# Patient Record
Sex: FEMALE | Race: WHITE | NOT HISPANIC OR LATINO | Employment: OTHER | URBAN - METROPOLITAN AREA
[De-identification: names, ages, dates, MRNs, and addresses within clinical notes are randomized per-mention and may not be internally consistent; named-entity substitution may affect disease eponyms.]

---

## 2023-08-16 ENCOUNTER — OFFICE VISIT (OUTPATIENT)
Dept: PRIMARY CARE | Facility: CLINIC | Age: 63
End: 2023-08-16
Payer: MEDICARE

## 2023-08-16 VITALS
HEIGHT: 66 IN | RESPIRATION RATE: 17 BRPM | BODY MASS INDEX: 44.45 KG/M2 | OXYGEN SATURATION: 94 % | HEART RATE: 86 BPM | TEMPERATURE: 96 F | SYSTOLIC BLOOD PRESSURE: 106 MMHG | DIASTOLIC BLOOD PRESSURE: 73 MMHG | WEIGHT: 276.6 LBS

## 2023-08-16 DIAGNOSIS — M43.06 LUMBAR SPONDYLOLYSIS: ICD-10-CM

## 2023-08-16 DIAGNOSIS — F32.1 MAJOR DEPRESSIVE DISORDER, SINGLE EPISODE, MODERATE DEGREE (MULTI): Primary | ICD-10-CM

## 2023-08-16 DIAGNOSIS — M16.12 ARTHRITIS OF LEFT HIP: ICD-10-CM

## 2023-08-16 DIAGNOSIS — E78.2 MIXED HYPERLIPIDEMIA: ICD-10-CM

## 2023-08-16 PROBLEM — M65.4 DE QUERVAIN'S TENOSYNOVITIS: Status: RESOLVED | Noted: 2023-08-16 | Resolved: 2023-08-16

## 2023-08-16 PROBLEM — M48.061 SPINAL STENOSIS OF LUMBAR REGION WITHOUT NEUROGENIC CLAUDICATION: Status: ACTIVE | Noted: 2023-08-16

## 2023-08-16 PROBLEM — M54.16 ACUTE LUMBAR RADICULOPATHY: Status: RESOLVED | Noted: 2023-08-16 | Resolved: 2023-08-16

## 2023-08-16 PROBLEM — M54.50 LOW BACK PAIN: Status: RESOLVED | Noted: 2023-08-16 | Resolved: 2023-08-16

## 2023-08-16 PROCEDURE — 99204 OFFICE O/P NEW MOD 45 MIN: CPT | Performed by: FAMILY MEDICINE

## 2023-08-16 PROCEDURE — 1036F TOBACCO NON-USER: CPT | Performed by: FAMILY MEDICINE

## 2023-08-16 RX ORDER — ESCITALOPRAM OXALATE 10 MG/1
10 TABLET ORAL DAILY
Qty: 30 TABLET | Refills: 1 | Status: SHIPPED | OUTPATIENT
Start: 2023-08-16 | End: 2023-09-11 | Stop reason: SDUPTHER

## 2023-08-16 RX ORDER — BUPROPION HYDROCHLORIDE 150 MG/1
150 TABLET ORAL EVERY MORNING
Qty: 30 TABLET | Refills: 1 | Status: SHIPPED | OUTPATIENT
Start: 2023-08-16 | End: 2023-09-11 | Stop reason: SDUPTHER

## 2023-08-16 RX ORDER — BUSPIRONE HYDROCHLORIDE 15 MG/1
15 TABLET ORAL 3 TIMES DAILY PRN
Qty: 90 TABLET | Refills: 11 | Status: SHIPPED | OUTPATIENT
Start: 2023-08-16 | End: 2024-08-15

## 2023-08-16 ASSESSMENT — PATIENT HEALTH QUESTIONNAIRE - PHQ9
6. FEELING BAD ABOUT YOURSELF - OR THAT YOU ARE A FAILURE OR HAVE LET YOURSELF OR YOUR FAMILY DOWN: MORE THAN HALF THE DAYS
8. MOVING OR SPEAKING SO SLOWLY THAT OTHER PEOPLE COULD HAVE NOTICED. OR THE OPPOSITE, BEING SO FIGETY OR RESTLESS THAT YOU HAVE BEEN MOVING AROUND A LOT MORE THAN USUAL: SEVERAL DAYS
5. POOR APPETITE OR OVEREATING: NEARLY EVERY DAY
SUM OF ALL RESPONSES TO PHQ QUESTIONS 1-9: 16
10. IF YOU CHECKED OFF ANY PROBLEMS, HOW DIFFICULT HAVE THESE PROBLEMS MADE IT FOR YOU TO DO YOUR WORK, TAKE CARE OF THINGS AT HOME, OR GET ALONG WITH OTHER PEOPLE: VERY DIFFICULT
7. TROUBLE CONCENTRATING ON THINGS, SUCH AS READING THE NEWSPAPER OR WATCHING TELEVISION: NOT AT ALL
2. FEELING DOWN, DEPRESSED OR HOPELESS: MORE THAN HALF THE DAYS
3. TROUBLE FALLING OR STAYING ASLEEP OR SLEEPING TOO MUCH: MORE THAN HALF THE DAYS
SUM OF ALL RESPONSES TO PHQ9 QUESTIONS 1 AND 2: 5
9. THOUGHTS THAT YOU WOULD BE BETTER OFF DEAD, OR OF HURTING YOURSELF: NOT AT ALL
1. LITTLE INTEREST OR PLEASURE IN DOING THINGS: NEARLY EVERY DAY
4. FEELING TIRED OR HAVING LITTLE ENERGY: NEARLY EVERY DAY

## 2023-08-16 ASSESSMENT — COLUMBIA-SUICIDE SEVERITY RATING SCALE - C-SSRS
2. HAVE YOU ACTUALLY HAD ANY THOUGHTS OF KILLING YOURSELF?: NO
6. HAVE YOU EVER DONE ANYTHING, STARTED TO DO ANYTHING, OR PREPARED TO DO ANYTHING TO END YOUR LIFE?: NO
1. IN THE PAST MONTH, HAVE YOU WISHED YOU WERE DEAD OR WISHED YOU COULD GO TO SLEEP AND NOT WAKE UP?: NO

## 2023-08-16 ASSESSMENT — ENCOUNTER SYMPTOMS
DYSPHORIC MOOD: 1
HEADACHES: 0
APPETITE CHANGE: 0
DEPRESSION: 1
SHORTNESS OF BREATH: 0
LOSS OF SENSATION IN FEET: 0
HALLUCINATIONS: 0
OCCASIONAL FEELINGS OF UNSTEADINESS: 1
CONFUSION: 0
POLYDIPSIA: 0
DECREASED CONCENTRATION: 1
HYPERACTIVE: 0
POLYPHAGIA: 0
COUGH: 0
NERVOUS/ANXIOUS: 0
PALPITATIONS: 0
SLEEP DISTURBANCE: 0
ACTIVITY CHANGE: 0
UNEXPECTED WEIGHT CHANGE: 0

## 2023-08-16 NOTE — PATIENT INSTRUCTIONS
Start Escitalopram 10 mg daily.   Start Wellbutrin 150 mg once a day.     Follow up in 3 weeks, call sooner if issues.     988 is a crisis line if you are struggling. IF you are actively suicidal, you need to go directly to ER>

## 2023-08-16 NOTE — ASSESSMENT & PLAN NOTE
Discussed diagnosis and medications.   Elysia was counseled on diagnosis and treatment options, including counseling and medications. After shared decision-making, patient agreed to start antidepressant medication. She understands latency of effect on initiation, need to titrate dose, need to take medication on a scheduled basis at same time every day, and need to call before stopping or altering medication dosing. Elysia also understands risk of transient increase in anxiety on initiation of medication, risk of bang in undiagnosed bipolar disorder, risk of withdrawal if stops suddenly, need for extended duration of medication of at least 6-12 months once therapeutic dose of correct agent achieved, and possible need to try alternate agents if initial agent ineffective on titration. Discussed risk, benefits and side effects of medication, including increased risk of suicidal ideation. Patient is not currently a risk to self or others, is able to contract for safety, and did so at this visit. Patient agreed to call if any side effects and discuss this before stopping medication so can assess and properly address. Agrees to frequent follow up during titration phase. Given information re 988 for crisis and suicide prevention. Elysia agreed to seek immediate help if She feels there is imminent risk of self-harm.     Patient was started on Escitalopram and WEllbutrin. In addition, was also offered and accepted Buspirone 15 mg tid for prn use for anxiety.     She will seek out counselor. She will follow up in 3 weeks. Given info re 988.

## 2023-08-16 NOTE — PROGRESS NOTES
Subjective   Patient ID: Elysia Zuniga is a 62 y.o. female who presents for Establish Care.    New patient    Right hip pain, has degeneration and told bone-on-bone. She was supposed to have hip surgery last year. Had insurance issues. She had hip specialist at [facility]. She switched to Medicare due to herniated discs in back. Was using cane until recently, was doctor at [facility] Dr. Toney then had to switch to [facility] Dr. Zimmer. Now insurance has changed and she needs to come back here for her care.     Has severe Degen left hip and was to have surgery.  Did see PCP at [facility]. Told EKG abnormal and needed cardiac clearance. She is not sure exactly what was going on with EKG.     Hyperlipidemia - last labs 1 yr ago. TG over 300, LDL consistently over 150. Sugars and CMP were normal.     Right sided lunbar spinal stenosis. MRI spine 2018 whowed herniated disc with probable central canal stenosis. She has not been seen for this in a while. She was on cane for a while. Now using walker. Initially was at Rehab at [facility] for couple weeks and was doing better. Her back is bothering her more.     MRI choswed cholelithiasis as incidental finding but she has not symptoms.     Depression - has had since 2019 or 2020. She had feelings of needing to isolate from others, easily irritated, intolerance, does not want to be around others. She is much more negative. Has spells of weepiness. Feels badly about self. Hates way she looks, then gets stressed and overeats. Is stress eater. Hopeless and worthless. No delusions or hallucinations. Live with Mom. Never , no children. Has 1 brother and one sister, 5 nieces and nephews. They have noticed change in her. Was on Welbutrin 150 mg bid then 300 mg XR. Had less agitation and weepiness. Less overwhelmed and frustrated. Worked well for a couple months. She did not go back about it. NO side effects. It took the edge off but did not make her feels markedly improved.  No suicidal thoughts.  "           Current Outpatient Medications:     buPROPion XL (Wellbutrin XL) 150 mg 24 hr tablet, Take 1 tablet (150 mg) by mouth once daily in the morning. Do not crush, chew, or split., Disp: 30 tablet, Rfl: 1    busPIRone (Buspar) 15 mg tablet, Take 1 tablet (15 mg) by mouth 3 times a day as needed (anxiety or stress)., Disp: 90 tablet, Rfl: 11    escitalopram (Lexapro) 10 mg tablet, Take 1 tablet (10 mg) by mouth once daily., Disp: 30 tablet, Rfl: 1    Patient Active Problem List   Diagnosis    Lumbar spondylolysis    Mixed hyperlipidemia    Major depressive disorder, single episode, moderate (CMS/HCC)    Arthritis of left hip         Review of Systems   Constitutional:  Negative for activity change, appetite change and unexpected weight change.   Respiratory:  Negative for cough and shortness of breath.    Cardiovascular:  Positive for leg swelling. Negative for chest pain and palpitations.        Right leg swells more than left for years. She has varicose veins.    Endocrine: Positive for heat intolerance. Negative for polydipsia, polyphagia and polyuria.   Genitourinary:         Unable to make it to bathroom on time. As soon as gets urge, she has to go now. Has had stress incontincence in the past. Weight was normally around 220-230.   Skin:  Negative for rash.   Neurological:  Negative for headaches.   Psychiatric/Behavioral:  Positive for decreased concentration and dysphoric mood. Negative for confusion, hallucinations, sleep disturbance and suicidal ideas. The patient is not nervous/anxious and is not hyperactive.        Objective   /73 (BP Location: Left arm, Patient Position: Sitting, BP Cuff Size: Large adult)   Pulse 86   Temp 35.6 °C (96 °F)   Resp 17   Ht 1.676 m (5' 6\")   Wt 125 kg (276 lb 9.6 oz)   SpO2 94%   BMI 44.64 kg/m²     Physical Exam  Vitals reviewed.   Constitutional:       Appearance: Normal appearance.   Pulmonary:      Effort: Pulmonary effort is normal.   Musculoskeletal: "      Cervical back: Normal range of motion and neck supple.   Skin:     General: Skin is warm and dry.   Neurological:      Mental Status: She is alert.   Psychiatric:         Attention and Perception: Attention normal.         Mood and Affect: Mood is depressed. Affect is tearful.         Speech: Speech normal.         Behavior: Behavior normal.         Thought Content: Thought content normal.         Cognition and Memory: Cognition normal.         Judgment: Judgment normal.         Assessment/Plan   Problem List Items Addressed This Visit       Lumbar spondylolysis    Mixed hyperlipidemia     Elevated LDL and TG last checked in 2022. Will readdress once address depression meds.          Major depressive disorder, single episode, moderate (CMS/HCC) - Primary     Discussed diagnosis and medications.   Elysia was counseled on diagnosis and treatment options, including counseling and medications. After shared decision-making, patient agreed to start antidepressant medication. She understands latency of effect on initiation, need to titrate dose, need to take medication on a scheduled basis at same time every day, and need to call before stopping or altering medication dosing. Elysia also understands risk of transient increase in anxiety on initiation of medication, risk of bang in undiagnosed bipolar disorder, risk of withdrawal if stops suddenly, need for extended duration of medication of at least 6-12 months once therapeutic dose of correct agent achieved, and possible need to try alternate agents if initial agent ineffective on titration. Discussed risk, benefits and side effects of medication, including increased risk of suicidal ideation. Patient is not currently a risk to self or others, is able to contract for safety, and did so at this visit. Patient agreed to call if any side effects and discuss this before stopping medication so can assess and properly address. Agrees to frequent follow up during titration  phase. Given information re 988 for crisis and suicide prevention. Elysia agreed to seek immediate help if She feels there is imminent risk of self-harm.     Patient was started on Escitalopram and WEllbutrin. In addition, was also offered and accepted Buspirone 15 mg tid for prn use for anxiety.     She will seek out counselor. She will follow up in 3 weeks. Given info re 988.            Relevant Medications    buPROPion XL (Wellbutrin XL) 150 mg 24 hr tablet    escitalopram (Lexapro) 10 mg tablet    busPIRone (Buspar) 15 mg tablet    Arthritis of left hip     Will readdress once deal with medication. She will sort out insurance in meantime.               Assessment, plans, tests, and follow up discussed with patient and patient verbalized understanding. Elysia was given an opportunity to ask questions and  any concerns were addressed including but not limited to meds, side effects, need to address other issues after depression under control. .

## 2023-09-11 ENCOUNTER — OFFICE VISIT (OUTPATIENT)
Dept: PRIMARY CARE | Facility: CLINIC | Age: 63
End: 2023-09-11
Payer: MEDICARE

## 2023-09-11 VITALS
WEIGHT: 271.6 LBS | BODY MASS INDEX: 43.65 KG/M2 | OXYGEN SATURATION: 98 % | DIASTOLIC BLOOD PRESSURE: 58 MMHG | HEIGHT: 66 IN | HEART RATE: 94 BPM | TEMPERATURE: 96.2 F | SYSTOLIC BLOOD PRESSURE: 100 MMHG

## 2023-09-11 DIAGNOSIS — Z11.59 ENCOUNTER FOR HEPATITIS C SCREENING TEST FOR LOW RISK PATIENT: ICD-10-CM

## 2023-09-11 DIAGNOSIS — Z12.31 ENCOUNTER FOR SCREENING MAMMOGRAM FOR BREAST CANCER: ICD-10-CM

## 2023-09-11 DIAGNOSIS — Z12.4 SCREENING FOR CERVICAL CANCER: ICD-10-CM

## 2023-09-11 DIAGNOSIS — R73.09 OTHER ABNORMAL GLUCOSE: ICD-10-CM

## 2023-09-11 DIAGNOSIS — E66.09 OBESITY DUE TO EXCESS CALORIES WITHOUT SERIOUS COMORBIDITY, UNSPECIFIED CLASSIFICATION: ICD-10-CM

## 2023-09-11 DIAGNOSIS — Z13.220 SCREENING, LIPID: ICD-10-CM

## 2023-09-11 DIAGNOSIS — M25.552 PAIN, JOINT, HIP, LEFT: ICD-10-CM

## 2023-09-11 DIAGNOSIS — Z13.1 SCREENING FOR DIABETES MELLITUS: ICD-10-CM

## 2023-09-11 DIAGNOSIS — F32.1 MAJOR DEPRESSIVE DISORDER, SINGLE EPISODE, MODERATE DEGREE (MULTI): Primary | ICD-10-CM

## 2023-09-11 DIAGNOSIS — Z12.11 SCREEN FOR COLON CANCER: ICD-10-CM

## 2023-09-11 PROCEDURE — 1036F TOBACCO NON-USER: CPT | Performed by: FAMILY MEDICINE

## 2023-09-11 PROCEDURE — 99214 OFFICE O/P EST MOD 30 MIN: CPT | Performed by: FAMILY MEDICINE

## 2023-09-11 RX ORDER — BUPROPION HYDROCHLORIDE 150 MG/1
150 TABLET ORAL EVERY MORNING
Qty: 90 TABLET | Refills: 3 | Status: SHIPPED | OUTPATIENT
Start: 2023-09-11 | End: 2024-09-10

## 2023-09-11 RX ORDER — MULTIVITAMIN
1 TABLET ORAL DAILY
COMMUNITY

## 2023-09-11 RX ORDER — DICLOFENAC SODIUM 10 MG/G
4 GEL TOPICAL 4 TIMES DAILY PRN
Qty: 450 G | Refills: 2 | Status: SHIPPED | OUTPATIENT
Start: 2023-09-11

## 2023-09-11 RX ORDER — IBUPROFEN 100 MG/5ML
1000 SUSPENSION, ORAL (FINAL DOSE FORM) ORAL CONTINUOUS PRN
COMMUNITY

## 2023-09-11 RX ORDER — ESCITALOPRAM OXALATE 10 MG/1
10 TABLET ORAL DAILY
Qty: 90 TABLET | Refills: 3 | Status: SHIPPED | OUTPATIENT
Start: 2023-09-11 | End: 2024-09-10

## 2023-09-11 NOTE — PROGRESS NOTES
Subjective   Patient ID: Elysia Zuniga is a 63 y.o. female who presents for 3 week follow up depression .    Here to follow up on Depression and anxiety - mood is better. Much less agitation. Also on Bupropion which was started last time.  Only needed Buspar 4 times in a month.     Obesity - she is losing weight and lost 5 lbs since last visit. Less cravings since on Bupropion.     Due for Medicare Wellness still this year.     Hip pain left times 1.5 yrs. Standing is the worse. Unable to stand up straight. Has history of disc issues. Walks hunched over. Walker when out of house. Pain worse. Pain near greater trochanter and in lower back. Sitting is ok. Unable to stand up for any length of time. Had xrays of hip and told her bad arthritis. Hip xr done [facility] April 2022. Pain is worse when getting up from sitting.     Has history herniated disc lumbar in the past. No numbness or tingling or pain down leg.            Current Outpatient Medications:     ascorbic acid (Vitamin C) 1,000 mg tablet, Take 1 tablet (1,000 mg) by mouth once daily., Disp: , Rfl:     busPIRone (Buspar) 15 mg tablet, Take 1 tablet (15 mg) by mouth 3 times a day as needed (anxiety or stress)., Disp: 90 tablet, Rfl: 11    melatonin 10 mg tablet,chewable, Chew once daily at bedtime., Disp: , Rfl:     multivitamin tablet, Take 1 tablet by mouth once daily., Disp: , Rfl:     buPROPion XL (Wellbutrin XL) 150 mg 24 hr tablet, Take 1 tablet (150 mg) by mouth once daily in the morning. Do not crush, chew, or split., Disp: 90 tablet, Rfl: 3    diclofenac sodium (Voltaren) 1 % gel gel, Apply 1 Application topically 4 times a day as needed (pain)., Disp: 450 g, Rfl: 2    escitalopram (Lexapro) 10 mg tablet, Take 1 tablet (10 mg) by mouth once daily., Disp: 90 tablet, Rfl: 3    Patient Active Problem List   Diagnosis    Lumbar spondylolysis    Mixed hyperlipidemia    Major depressive disorder, single episode, moderate degree (CMS/HCC)    Arthritis of left hip  "   Spinal stenosis of lumbar region without neurogenic claudication    Pain, joint, hip, left    Other abnormal glucose    Obesity due to excess calories without serious comorbidity         Review of Systems    Objective   /58 (BP Location: Left arm, Patient Position: Sitting, BP Cuff Size: Large adult)   Pulse 94   Temp 35.7 °C (96.2 °F) (Temporal)   Ht 1.676 m (5' 6\")   Wt 123 kg (271 lb 9.6 oz)   SpO2 98%   BMI 43.84 kg/m²     Physical Exam    Assessment/Plan   Problem List Items Addressed This Visit       Major depressive disorder, single episode, moderate degree (CMS/HCC) - Primary     Doing great on medicatio. Refilled meds for one yr.          Relevant Medications    buPROPion XL (Wellbutrin XL) 150 mg 24 hr tablet    escitalopram (Lexapro) 10 mg tablet    Pain, joint, hip, left     Reviewed prior xray. She had advanced arthritis left hip. NSAIDs cause leg swelling. Will add diclofenac gel Refer to ortho         Relevant Medications    diclofenac sodium (Voltaren) 1 % gel gel    Other Relevant Orders    Referral to Orthopaedic Surgery    Other abnormal glucose     Significance unclear. Check A1C.          Relevant Orders    Hemoglobin A1C    Obesity due to excess calories without serious comorbidity     Discussed healthy lifestyle, diet, exercise and weight loss. Discussed role in glucose management.          Relevant Orders    Comprehensive Metabolic Panel    Hemoglobin A1C    Lipid Panel     Other Visit Diagnoses       Screen for colon cancer        Relevant Orders    Colonoscopy Screening    Encounter for screening mammogram for breast cancer        Relevant Orders    BI mammo bilateral screening tomosynthesis    Screening for cervical cancer        Relevant Orders    Referral to Gynecology    Screening for diabetes mellitus        Relevant Orders    Comprehensive Metabolic Panel    Hemoglobin A1C    Screening, lipid        Relevant Orders    Lipid Panel    Encounter for hepatitis C screening " test for low risk patient        Relevant Orders    Hepatitis C Antibody              Assessment, plans, tests, and follow up discussed with patient and patient verbalized understanding. Elysia was given an opportunity to ask questions and  any concerns were addressed including but not limited to lab results, orders, health screenings, medications and follow up.

## 2023-09-18 PROBLEM — R73.09 OTHER ABNORMAL GLUCOSE: Status: ACTIVE | Noted: 2023-09-18

## 2023-09-18 PROBLEM — E66.09 OBESITY DUE TO EXCESS CALORIES WITHOUT SERIOUS COMORBIDITY: Status: ACTIVE | Noted: 2023-09-18

## 2023-09-29 DIAGNOSIS — M25.552 HIP PAIN, ACUTE, LEFT: ICD-10-CM

## 2023-09-30 RX ORDER — DEXTROMETHORPHAN HYDROBROMIDE, GUAIFENESIN 5; 100 MG/5ML; MG/5ML
1 LIQUID ORAL
COMMUNITY
End: 2023-12-15 | Stop reason: HOSPADM

## 2023-09-30 RX ORDER — CHOLECALCIFEROL (VITAMIN D3) 125 MCG
CAPSULE ORAL
COMMUNITY
End: 2023-12-12 | Stop reason: ALTCHOICE

## 2023-09-30 RX ORDER — MICONAZOLE NITRATE 2 %
1 POWDER (GRAM) TOPICAL DAILY
COMMUNITY
End: 2023-12-12 | Stop reason: ALTCHOICE

## 2023-10-05 ENCOUNTER — OFFICE VISIT (OUTPATIENT)
Dept: ORTHOPEDIC SURGERY | Facility: CLINIC | Age: 63
End: 2023-10-05
Payer: MEDICARE

## 2023-10-05 ENCOUNTER — ANCILLARY PROCEDURE (OUTPATIENT)
Dept: RADIOLOGY | Facility: CLINIC | Age: 63
End: 2023-10-05
Payer: MEDICARE

## 2023-10-05 ENCOUNTER — PREP FOR PROCEDURE (OUTPATIENT)
Dept: ORTHOPEDIC SURGERY | Facility: CLINIC | Age: 63
End: 2023-10-05

## 2023-10-05 ENCOUNTER — APPOINTMENT (OUTPATIENT)
Dept: RADIOLOGY | Facility: CLINIC | Age: 63
End: 2023-10-05
Payer: MEDICARE

## 2023-10-05 VITALS — WEIGHT: 270.2 LBS | BODY MASS INDEX: 43.42 KG/M2 | HEIGHT: 66 IN

## 2023-10-05 DIAGNOSIS — M25.552 HIP PAIN, ACUTE, LEFT: ICD-10-CM

## 2023-10-05 DIAGNOSIS — M16.10 ARTHRITIS OF HIP: Primary | ICD-10-CM

## 2023-10-05 DIAGNOSIS — M25.552 PAIN, JOINT, HIP, LEFT: ICD-10-CM

## 2023-10-05 PROCEDURE — 99214 OFFICE O/P EST MOD 30 MIN: CPT | Performed by: SPECIALIST

## 2023-10-05 PROCEDURE — 73502 X-RAY EXAM HIP UNI 2-3 VIEWS: CPT | Mod: LT

## 2023-10-05 PROCEDURE — 73502 X-RAY EXAM HIP UNI 2-3 VIEWS: CPT | Mod: LEFT SIDE | Performed by: RADIOLOGY

## 2023-10-05 PROCEDURE — 1036F TOBACCO NON-USER: CPT | Performed by: SPECIALIST

## 2023-10-05 NOTE — LETTER
October 5, 2023     Xiomara Shrestha MD  [address] [address]    Patient: Elysia Zuniga   YOB: 1960   Date of Visit: 10/5/2023       Dear Dr. Xiomara Shrestha MD:    Thank you for referring Elysia Zuniga to me for evaluation. Below are my notes for this consultation.  If you have questions, please do not hesitate to call me. I look forward to following your patient along with you.       Sincerely,     Natanael De Jesus MD      CC: No Recipients  ______________________________________________________________________________________    PRIMARY CARE PHYSICIAN: Xiomara Shrestha MD  REFERRING PROVIDER: Xiomara Shrestha MD  [address] [place] [address]     CONSULT ORTHOPAEDIC: Hip Evaluation        ASSESSMENT & PLAN    IMPRESSION:  Left hip osteoarthritis    PLAN:  Patient is failed all conservative treatments and wishes to proceed with surgery.  We had a long discussion regarding the nature of the left total hip arthroplasty.  All risks and benefits and treatment options were described in great detail with the patient and a preoperative informed consent was obtained surgery is scheduled for this December.      SUBJECTIVE  CHIEF COMPLAINT:   Chief Complaint   Patient presents with   • Left Hip - Pain        HPI: Elysia Zuniga is a 63 y.o. patient. Elysia Zuniga has had progressive problems with the left hip over the past 10 years. They do not report any trauma. They do not report any constant or progressive numbness or tingling in their legs. Their symptoms are interfering with activities which include simple daily standing and walking, ascending and descending stairs, and affects sleep.     FUNCTIONAL STATUS: limited:  unable to perform activities of daily living.  AMBULATORY STATUS: Househould ambulation with partial assistance with walker  PREVIOUS TREATMENTS: Cortisone injection oral anti-inflammatories, physical  "therapy, with no improvement  HISTORY OF SURGERY ON AFFECTED HIP(S): No   BACK PAIN REPORTED: Yes           OBJECTIVE    PHYSICAL EXAM      6/3/2018     6:49 PM 6/4/2018     1:50 AM 8/16/2023     1:57 PM 9/11/2023    11:27 AM 10/5/2023     1:16 PM   Vitals   Systolic 172  106 100    Diastolic 97  73 58    Heart Rate 79  86 94    Temp 36.6 °C (97.9 °F)  35.6 °C (96 °F) 35.7 °C (96.2 °F)    Resp 20  17     Height (in) 1.676 m (5' 5.98\") 1.676 m (5' 5.98\") 1.676 m (5' 6\") 1.676 m (5' 6\") 1.676 m (5' 6\")   Weight (lb) 264.99 250.88 276.6 271.6 270.2   BMI 42.79 kg/m2 40.51 kg/m2 44.64 kg/m2 43.84 kg/m2 43.61 kg/m2   BSA (m2) 2.36 m2 2.3 m2 2.41 m2 2.39 m2 2.39 m2   Visit Report   Report Report Report      Body mass index is 43.61 kg/m².              IMAGING:  Multiple views of the affected left hip(s) demonstrate: Severe osteoarthritis with associated sclerosis osteophytes and joint space reduction, and flattening of the femoral head..   X-rays were personally reviewed and interpreted by me.  Radiology reports were reviewed by me as well, if readily available at the time.                                  "

## 2023-10-05 NOTE — PROGRESS NOTES
"PRIMARY CARE PHYSICIAN: Xiomara Shrestha MD  REFERRING PROVIDER: Xiomara Shrestha MD  [address]  [address] [place] [address]     CONSULT ORTHOPAEDIC: Hip Evaluation        ASSESSMENT & PLAN    IMPRESSION:  Left hip osteoarthritis    PLAN:  Patient is failed all conservative treatments and wishes to proceed with surgery.  We had a long discussion regarding the nature of the left total hip arthroplasty.  All risks and benefits and treatment options were described in great detail with the patient and a preoperative informed consent was obtained surgery is scheduled for this December.      SUBJECTIVE  CHIEF COMPLAINT:   Chief Complaint   Patient presents with    Left Hip - Pain        HPI: Elysia Zuniga is a 63 y.o. patient. Elysia Zuniga has had progressive problems with the left hip over the past 10 years. They do not report any trauma. They do not report any constant or progressive numbness or tingling in their legs. Their symptoms are interfering with activities which include simple daily standing and walking, ascending and descending stairs, and affects sleep.     FUNCTIONAL STATUS: limited:  unable to perform activities of daily living.  AMBULATORY STATUS: Househould ambulation with partial assistance with walker  PREVIOUS TREATMENTS: Cortisone injection oral anti-inflammatories, physical therapy, with no improvement  HISTORY OF SURGERY ON AFFECTED HIP(S): No   BACK PAIN REPORTED: Yes           OBJECTIVE    PHYSICAL EXAM      6/3/2018     6:49 PM 6/4/2018     1:50 AM 8/16/2023     1:57 PM 9/11/2023    11:27 AM 10/5/2023     1:16 PM   Vitals   Systolic 172  106 100    Diastolic 97  73 58    Heart Rate 79  86 94    Temp 36.6 °C (97.9 °F)  35.6 °C (96 °F) 35.7 °C (96.2 °F)    Resp 20  17     Height (in) 1.676 m (5' 5.98\") 1.676 m (5' 5.98\") 1.676 m (5' 6\") 1.676 m (5' 6\") 1.676 m (5' 6\")   Weight (lb) 264.99 250.88 276.6 271.6 270.2   BMI 42.79 kg/m2 40.51 kg/m2 44.64 kg/m2 43.84 kg/m2 " 43.61 kg/m2   BSA (m2) 2.36 m2 2.3 m2 2.41 m2 2.39 m2 2.39 m2   Visit Report   Report Report Report      Body mass index is 43.61 kg/m².              IMAGING:  Multiple views of the affected left hip(s) demonstrate: Severe osteoarthritis with associated sclerosis osteophytes and joint space reduction, and flattening of the femoral head..   X-rays were personally reviewed and interpreted by me.  Radiology reports were reviewed by me as well, if readily available at the time.

## 2023-10-05 NOTE — PROGRESS NOTES
Left hip pain for years - states she needs a total hip replacement.   Was going to have it done but then covid happened so she didn't.  States she has mostly back pain but knows she has a herniated disc as well as hip OA.   Takes Tylenol during the day for pain.     Xrays done today     Hip Musculoskeletal Exam

## 2023-10-23 ENCOUNTER — HOSPITAL ENCOUNTER (OUTPATIENT)
Dept: RADIOLOGY | Facility: HOSPITAL | Age: 63
Discharge: HOME | End: 2023-10-23
Payer: MEDICARE

## 2023-10-23 DIAGNOSIS — Z12.31 ENCOUNTER FOR SCREENING MAMMOGRAM FOR BREAST CANCER: ICD-10-CM

## 2023-10-23 PROBLEM — Z01.419 WELL WOMAN EXAM WITH ROUTINE GYNECOLOGICAL EXAM: Status: ACTIVE | Noted: 2023-10-23

## 2023-10-23 PROCEDURE — 77067 SCR MAMMO BI INCL CAD: CPT | Mod: BILATERAL PROCEDURE | Performed by: RADIOLOGY

## 2023-10-23 PROCEDURE — 77063 BREAST TOMOSYNTHESIS BI: CPT | Mod: 50

## 2023-10-23 PROCEDURE — 77063 BREAST TOMOSYNTHESIS BI: CPT | Mod: BILATERAL PROCEDURE | Performed by: RADIOLOGY

## 2023-10-23 NOTE — ASSESSMENT & PLAN NOTE
Pap and HPV are sent today. Mammogram was performed last year.   Regular exercise and attaining/maintaining a healthy weight is encouraged.   Adequate calcium intake with diet or supplements is encouraged.    We will notify of any abnormal results.

## 2023-10-23 NOTE — PROGRESS NOTES
Subjective   Patient ID: Elysia Zuniga is a 63 y.o. female who presents for New Patient Visit (Gyn exam).  She was referred by Dr. Shrestha for annual exam. She denies having any pap for about 20 years. No pap or prior gyn record is found on review of the EMR. She denies any past cervical dysplasia. Mammogram returned negative last month.   She has a history of endometrial ablation in abut 2010. She was amenorrheic after this. She denies any issues transitioning into menopause.    She has urge incontinence overnight and needs to wear a pad for this. She is considering consultation for this when her hip issue is resolved.         Review of Systems   Constitutional:  Negative for activity change.   HENT:  Negative for congestion.    Respiratory:  Negative for apnea and cough.    Cardiovascular:  Negative for chest pain.   Gastrointestinal:  Negative for constipation and diarrhea.   Genitourinary:  Positive for enuresis and urgency. Negative for hematuria and vaginal pain.   Musculoskeletal:  Negative for joint swelling.   Neurological:  Negative for dizziness.   Psychiatric/Behavioral:  Negative for agitation.        Past Medical History:   Diagnosis Date    Acute lumbar radiculopathy 08/16/2023    Arthritis     De Quervain's tenosynovitis 08/16/2023    Depression     Spinal stenosis of lumbar region without neurogenic claudication 08/16/2023      Past Surgical History:   Procedure Laterality Date    ENDOMETRIAL ABLATION      NOSE SURGERY        Allergies   Allergen Reactions    Bee Venom Protein (Honey Bee) Other and Shortness of breath    Mold Unknown    Penicillins Hives, Itching, Other and Swelling     Hives sob      Current Outpatient Medications on File Prior to Visit   Medication Sig Dispense Refill    acetaminophen (Tylenol 8 HOUR) 650 mg ER tablet Take 1 tablet (650 mg) by mouth. 3-4 Times Daily; Do not crush, chew, or split.      ascorbic acid (Vitamin C) 1,000 mg tablet Take 1 tablet (1,000 mg) by mouth  once daily.      buPROPion XL (Wellbutrin XL) 150 mg 24 hr tablet Take 1 tablet (150 mg) by mouth once daily in the morning. Do not crush, chew, or split. 90 tablet 3    busPIRone (Buspar) 15 mg tablet Take 1 tablet (15 mg) by mouth 3 times a day as needed (anxiety or stress). 90 tablet 11    diclofenac sodium (Voltaren) 1 % gel gel Apply 1 Application topically 4 times a day as needed (pain). 450 g 2    escitalopram (Lexapro) 10 mg tablet Take 1 tablet (10 mg) by mouth once daily. 90 tablet 3    lysine HCL 1,000 mg tablet Take 1 tablet by mouth once daily.      melatonin 10 mg tablet,chewable Chew once daily at bedtime.      multivitamin tablet Take 1 tablet by mouth once daily.      naproxen sodium (Aleve) 220 mg capsule       RANITIDINE HCL ORAL Take 1 tablet by mouth once daily. 150 MG Tab       No current facility-administered medications on file prior to visit.        Objective   Physical Exam  Constitutional:       Appearance: Normal appearance. She is obese.   Neck:      Thyroid: No thyromegaly.   Cardiovascular:      Rate and Rhythm: Normal rate and regular rhythm.      Heart sounds: Normal heart sounds.   Pulmonary:      Effort: Pulmonary effort is normal.      Breath sounds: Normal breath sounds.   Chest:      Chest wall: No mass.   Breasts:     Right: Normal. No inverted nipple, mass, nipple discharge or skin change.      Left: Normal. No inverted nipple, mass, nipple discharge or skin change.   Abdominal:      General: There is no distension.      Palpations: Abdomen is soft. There is no mass.      Tenderness: There is no abdominal tenderness.   Genitourinary:     General: Normal vulva.      Exam position: Lithotomy position.      Labia:         Right: No rash.         Left: No rash.       Vagina: Normal. No lesions.      Cervix: No friability or lesion.      Uterus: Normal. Not enlarged and not tender.       Adnexa: Right adnexa normal and left adnexa normal.        Right: No mass or tenderness.           Left: No mass or tenderness.     Musculoskeletal:         General: No deformity.      Cervical back: Neck supple.   Lymphadenopathy:      Cervical: No cervical adenopathy.   Skin:     General: Skin is warm and dry.      Findings: No rash.   Neurological:      General: No focal deficit present.      Mental Status: She is alert.   Psychiatric:         Mood and Affect: Mood normal.         Behavior: Behavior is cooperative.         Thought Content: Thought content normal.           Assessment/Plan   Problem List Items Addressed This Visit             ICD-10-CM    Well woman exam with routine gynecological exam - Primary Z01.419     Pap and HPV are sent today. Mammogram was performed last year.   Regular exercise and attaining/maintaining a healthy weight is encouraged.   Adequate calcium intake with diet or supplements is encouraged.    We will notify of any abnormal results.

## 2023-10-25 ENCOUNTER — OFFICE VISIT (OUTPATIENT)
Dept: OBSTETRICS AND GYNECOLOGY | Facility: CLINIC | Age: 63
End: 2023-10-25
Payer: MEDICARE

## 2023-10-25 VITALS
DIASTOLIC BLOOD PRESSURE: 66 MMHG | HEIGHT: 66 IN | SYSTOLIC BLOOD PRESSURE: 98 MMHG | BODY MASS INDEX: 43.55 KG/M2 | WEIGHT: 271 LBS

## 2023-10-25 DIAGNOSIS — Z01.419 WELL WOMAN EXAM WITH ROUTINE GYNECOLOGICAL EXAM: Primary | ICD-10-CM

## 2023-10-25 PROCEDURE — 99386 PREV VISIT NEW AGE 40-64: CPT | Performed by: OBSTETRICS & GYNECOLOGY

## 2023-10-25 PROCEDURE — 1036F TOBACCO NON-USER: CPT | Performed by: OBSTETRICS & GYNECOLOGY

## 2023-10-25 PROCEDURE — 88175 CYTOPATH C/V AUTO FLUID REDO: CPT

## 2023-10-25 PROCEDURE — 87624 HPV HI-RISK TYP POOLED RSLT: CPT

## 2023-10-25 ASSESSMENT — ENCOUNTER SYMPTOMS
DIZZINESS: 0
APNEA: 0
CONSTIPATION: 0
COUGH: 0
HEMATURIA: 0
JOINT SWELLING: 0
ACTIVITY CHANGE: 0
DIARRHEA: 0
AGITATION: 0

## 2023-11-07 LAB
CYTOLOGY CMNT CVX/VAG CYTO-IMP: NORMAL
HPV HR GENOTYPES PNL CVX NAA+PROBE: NEGATIVE
HPV HR GENOTYPES PNL CVX NAA+PROBE: NEGATIVE
HPV16 DNA SPEC QL NAA+PROBE: NEGATIVE
HPV18 DNA SPEC QL NAA+PROBE: NEGATIVE
LAB AP HPV GENOTYPE QUESTION: YES
LAB AP HPV HR: NORMAL
LABORATORY COMMENT REPORT: NORMAL
PATH REPORT.TOTAL CANCER: NORMAL

## 2023-11-28 ENCOUNTER — TELEPHONE (OUTPATIENT)
Dept: ORTHOPEDIC SURGERY | Facility: CLINIC | Age: 63
End: 2023-11-28
Payer: MEDICARE

## 2023-11-28 ENCOUNTER — TELEPHONE (OUTPATIENT)
Dept: PRIMARY CARE | Facility: CLINIC | Age: 63
End: 2023-11-28
Payer: MEDICARE

## 2023-11-28 NOTE — TELEPHONE ENCOUNTER
Patient is having hip replacement on 12/14, and she understands that she is not allowed to use a recliner, but wants to know if she can use a zero gravity chair?

## 2023-11-28 NOTE — TELEPHONE ENCOUNTER
Per Dr De Jesus, she can actually use a recliner or zero gravity chair as long as she can safely get in and out of them easily.  The issue with both of them is that when people have total hip replacements, getting out of a recliner or chair that is leaned back, is sometimes difficult and can pop their hips out of place if they were to throw themselves forward or lean too quickly to get in and out of the chair.     I did call and speak to the patient and let her know this, she was advised that as long as she can safely get in and out she should be okay in either, as long as she is careful and goes slowly.     She will call if she needs anything else.

## 2023-11-28 NOTE — TELEPHONE ENCOUNTER
Patient is scheduled for a let hi replacement on 12/14/2023. I tried to get the patient scheduled for a preop clearance appointment but you do not have any availability on your schedule. The patient is scheduled for the 13th for an annual visit.  Please advise,

## 2023-11-30 ENCOUNTER — OFFICE VISIT (OUTPATIENT)
Dept: PRIMARY CARE | Facility: CLINIC | Age: 63
End: 2023-11-30
Payer: MEDICARE

## 2023-11-30 VITALS
HEIGHT: 66 IN | WEIGHT: 262.6 LBS | SYSTOLIC BLOOD PRESSURE: 124 MMHG | OXYGEN SATURATION: 93 % | DIASTOLIC BLOOD PRESSURE: 88 MMHG | BODY MASS INDEX: 42.2 KG/M2 | TEMPERATURE: 97.9 F | HEART RATE: 87 BPM

## 2023-11-30 DIAGNOSIS — Z23 NEED FOR VACCINATION: ICD-10-CM

## 2023-11-30 DIAGNOSIS — R73.09 OTHER ABNORMAL GLUCOSE: ICD-10-CM

## 2023-11-30 DIAGNOSIS — M16.12 ARTHRITIS OF LEFT HIP: Primary | ICD-10-CM

## 2023-11-30 DIAGNOSIS — E78.2 MIXED HYPERLIPIDEMIA: ICD-10-CM

## 2023-11-30 DIAGNOSIS — E66.01 OBESITY, CLASS III, BMI 40-49.9 (MORBID OBESITY) (MULTI): ICD-10-CM

## 2023-11-30 DIAGNOSIS — Z01.818 PRE-OP EVALUATION: ICD-10-CM

## 2023-11-30 PROBLEM — E66.813 OBESITY, CLASS III, BMI 40-49.9 (MORBID OBESITY): Status: ACTIVE | Noted: 2023-11-30

## 2023-11-30 PROBLEM — E66.813 OBESITY, CLASS III, BMI 40-49.9 (MORBID OBESITY): Status: ACTIVE | Noted: 2023-09-18

## 2023-11-30 PROBLEM — Z01.419 WELL WOMAN EXAM WITH ROUTINE GYNECOLOGICAL EXAM: Status: RESOLVED | Noted: 2023-10-23 | Resolved: 2023-11-30

## 2023-11-30 PROCEDURE — G0008 ADMIN INFLUENZA VIRUS VAC: HCPCS | Performed by: FAMILY MEDICINE

## 2023-11-30 PROCEDURE — 90686 IIV4 VACC NO PRSV 0.5 ML IM: CPT | Performed by: FAMILY MEDICINE

## 2023-11-30 PROCEDURE — 1036F TOBACCO NON-USER: CPT | Performed by: FAMILY MEDICINE

## 2023-11-30 PROCEDURE — 99214 OFFICE O/P EST MOD 30 MIN: CPT | Performed by: FAMILY MEDICINE

## 2023-11-30 ASSESSMENT — ENCOUNTER SYMPTOMS
MYALGIAS: 0
PALPITATIONS: 0
POLYPHAGIA: 0
SHORTNESS OF BREATH: 0
LOSS OF SENSATION IN FEET: 0
UNEXPECTED WEIGHT CHANGE: 0
COUGH: 0
NAUSEA: 0
POLYDIPSIA: 0
FATIGUE: 0
OCCASIONAL FEELINGS OF UNSTEADINESS: 0
HEADACHES: 0
DEPRESSION: 0
TROUBLE SWALLOWING: 0
APPETITE CHANGE: 0
DIARRHEA: 0
DIZZINESS: 0

## 2023-11-30 NOTE — ASSESSMENT & PLAN NOTE
Repeat labs ordered but not done yet. Last ,  and HDL 38 in 2021. Has repeat order. ASCVD risk currently calculated at 3.9%.

## 2023-11-30 NOTE — ASSESSMENT & PLAN NOTE
For surgery on 12/14/2023. She is cleared for surgery. Will need to avoid NSAIDs prior to surgery. DASI questionnaire done

## 2023-11-30 NOTE — PROGRESS NOTES
Subjective   Patient ID: Elysia Zuniga is a 63 y.o. female who presents for Pre-op Exam (Pre-Op Clearance for left hip total replacement by Dr. De Jesus on 12/14/2023. ).    Here for preoperative evaluation for left hip replacement. Scheduled 12/14 Dr. De Jesus. Has preop testing on 12/6.     She has no history of cardiac or pulmonary issues. Activity limited by her hip issues, not by tolerance.     Had slightly elevated glucose, A1C had been ordered and she is pending.     No history of thromboembolism. Only prior surgeries nasal and uterine ablation - no reactions to anesthesia.     Patient Active Problem List:     Lumbar spondylolysis     Mixed hyperlipidemia     Major depressive disorder, single episode, moderate degree (CMS/[facility])     Arthritis of left hip     Spinal stenosis of lumbar region without neurogenic claudication     Other abnormal glucose     Obesity, Class III, BMI 40-49.9 (morbid obesity) (CMS/[facility])               Current Outpatient Medications:     acetaminophen (Tylenol 8 HOUR) 650 mg ER tablet, Take 1 tablet (650 mg) by mouth. 3-4 Times Daily; Do not crush, chew, or split., Disp: , Rfl:     ascorbic acid (Vitamin C) 1,000 mg tablet, Take 1 tablet (1,000 mg) by mouth once daily., Disp: , Rfl:     buPROPion XL (Wellbutrin XL) 150 mg 24 hr tablet, Take 1 tablet (150 mg) by mouth once daily in the morning. Do not crush, chew, or split., Disp: 90 tablet, Rfl: 3    busPIRone (Buspar) 15 mg tablet, Take 1 tablet (15 mg) by mouth 3 times a day as needed (anxiety or stress)., Disp: 90 tablet, Rfl: 11    diclofenac sodium (Voltaren) 1 % gel gel, Apply 1 Application topically 4 times a day as needed (pain)., Disp: 450 g, Rfl: 2    escitalopram (Lexapro) 10 mg tablet, Take 1 tablet (10 mg) by mouth once daily., Disp: 90 tablet, Rfl: 3    lysine HCL 1,000 mg tablet, Take 1 tablet by mouth once daily., Disp: , Rfl:     melatonin 10 mg tablet,chewable, Chew once daily at bedtime., Disp: , Rfl:     multivitamin  "tablet, Take 1 tablet by mouth once daily., Disp: , Rfl:     RANITIDINE HCL ORAL, Take 1 tablet by mouth once daily. 150 MG Tab, Disp: , Rfl:     naproxen sodium (Aleve) 220 mg capsule, , Disp: , Rfl:     Patient Active Problem List   Diagnosis    Lumbar spondylolysis    Mixed hyperlipidemia    Major depressive disorder, single episode, moderate degree (CMS/HCC)    Arthritis of left hip    Spinal stenosis of lumbar region without neurogenic claudication    Other abnormal glucose    Obesity, Class III, BMI 40-49.9 (morbid obesity) (CMS/HCC)    Pre-op evaluation         Review of Systems   Constitutional:  Negative for appetite change, fatigue and unexpected weight change.   HENT:  Negative for trouble swallowing.    Respiratory:  Negative for cough and shortness of breath.    Cardiovascular:  Negative for chest pain, palpitations and leg swelling.   Gastrointestinal:  Negative for diarrhea and nausea.        GERD, on medication   Endocrine: Negative for cold intolerance, heat intolerance, polydipsia, polyphagia and polyuria.   Musculoskeletal:  Negative for myalgias.        Left hip pain   Skin:  Negative for rash.   Neurological:  Negative for dizziness and headaches.       Objective   /88 (BP Location: Right arm, Patient Position: Sitting)   Pulse 87   Temp 36.6 °C (97.9 °F)   Ht 1.676 m (5' 6\")   Wt 119 kg (262 lb 9.6 oz)   SpO2 93%   BMI 42.38 kg/m²     Physical Exam  Vitals reviewed.   Constitutional:       General: She is not in acute distress.     Appearance: She is obese. She is not ill-appearing.   HENT:      Nose: No congestion.   Eyes:      Conjunctiva/sclera: Conjunctivae normal.   Neck:      Vascular: No carotid bruit.      Comments: No thyromegaly  Cardiovascular:      Rate and Rhythm: Normal rate and regular rhythm.      Pulses: Normal pulses.      Heart sounds: No murmur heard.  Pulmonary:      Effort: Pulmonary effort is normal.      Breath sounds: Normal breath sounds.   Musculoskeletal: "      Right lower leg: No edema.      Left lower leg: No edema.   Skin:     General: Skin is warm and dry.      Findings: No rash.   Neurological:      Mental Status: She is alert. Mental status is at baseline.   Psychiatric:         Mood and Affect: Mood normal.         Assessment/Plan   Problem List Items Addressed This Visit       Mixed hyperlipidemia     Repeat labs ordered but not done yet. Last ,  and HDL 38 in 2021. Has repeat order. ASCVD risk currently calculated at 3.9%.          Arthritis of left hip - Primary     For surgery on 12/14/2023. She is cleared for surgery. Will need to avoid NSAIDs prior to surgery. DASI questionnaire done         Other abnormal glucose     Mild elevation. A1C has been ordered but not done yet.          Obesity, Class III, BMI 40-49.9 (morbid obesity) (CMS/[facility])     She is working on losing weight but hip arthritis is limiting in activity. Likely would benefit from sleep study in the long run.         Pre-op evaluation     DASI score 22.2 which is equivalent to 5.4 mets. RSCI score is 0 - cardiovascular risk 0.5%. Explained to patient. From my standpoint, low risk for adverse cardiovascular outcomes. No contraindication to hip surgery.               Assessment, plans, tests, and follow up discussed with patient and patient verbalized understanding. Elysia was given an opportunity to ask questions and  any concerns were addressed including but not limited to cardiovascular risks, need to complete labs, follow up.

## 2023-11-30 NOTE — ASSESSMENT & PLAN NOTE
She is working on losing weight but hip arthritis is limiting in activity. Likely would benefit from sleep study in the long run.

## 2023-11-30 NOTE — PATIENT INSTRUCTIONS
Keep Medicare Wellness on December 13.     You had flu shot today.   Need COVID booster and Shingrix vaccines, these need to be given at pharmacy.

## 2023-11-30 NOTE — ASSESSMENT & PLAN NOTE
DASI score 22.2 which is equivalent to 5.4 mets. RSCI score is 0 - cardiovascular risk 0.5%. Explained to patient. From my standpoint, low risk for adverse cardiovascular outcomes. No contraindication to hip surgery.

## 2023-12-06 ENCOUNTER — HOSPITAL ENCOUNTER (OUTPATIENT)
Dept: RADIOLOGY | Facility: HOSPITAL | Age: 63
Discharge: HOME | End: 2023-12-06
Payer: MEDICARE

## 2023-12-06 ENCOUNTER — HOSPITAL ENCOUNTER (OUTPATIENT)
Dept: CARDIOLOGY | Facility: HOSPITAL | Age: 63
Discharge: HOME | End: 2023-12-06
Payer: MEDICARE

## 2023-12-06 ENCOUNTER — PRE-ADMISSION TESTING (OUTPATIENT)
Dept: PREADMISSION TESTING | Facility: HOSPITAL | Age: 63
End: 2023-12-06
Payer: MEDICARE

## 2023-12-06 VITALS
WEIGHT: 258.9 LBS | SYSTOLIC BLOOD PRESSURE: 136 MMHG | TEMPERATURE: 97 F | DIASTOLIC BLOOD PRESSURE: 87 MMHG | RESPIRATION RATE: 18 BRPM | HEIGHT: 66 IN | BODY MASS INDEX: 41.61 KG/M2 | HEART RATE: 85 BPM | OXYGEN SATURATION: 95 %

## 2023-12-06 DIAGNOSIS — M16.12 ARTHRITIS OF LEFT HIP: ICD-10-CM

## 2023-12-06 DIAGNOSIS — Z01.818 PRE-OP EVALUATION: ICD-10-CM

## 2023-12-06 DIAGNOSIS — E66.01 OBESITY, CLASS III, BMI 40-49.9 (MORBID OBESITY) (MULTI): ICD-10-CM

## 2023-12-06 DIAGNOSIS — M16.12 ARTHRITIS OF LEFT HIP: Primary | ICD-10-CM

## 2023-12-06 DIAGNOSIS — E78.2 MIXED HYPERLIPIDEMIA: ICD-10-CM

## 2023-12-06 DIAGNOSIS — I45.10 RBBB (RIGHT BUNDLE BRANCH BLOCK): ICD-10-CM

## 2023-12-06 DIAGNOSIS — R73.09 OTHER ABNORMAL GLUCOSE: ICD-10-CM

## 2023-12-06 LAB
ANION GAP SERPL CALC-SCNC: 13 MMOL/L (ref 10–20)
APPEARANCE UR: ABNORMAL
BACTERIA #/AREA URNS AUTO: ABNORMAL /HPF
BILIRUB UR STRIP.AUTO-MCNC: NEGATIVE MG/DL
BUN SERPL-MCNC: 23 MG/DL (ref 6–23)
CALCIUM SERPL-MCNC: 9.3 MG/DL (ref 8.6–10.3)
CHLORIDE SERPL-SCNC: 106 MMOL/L (ref 98–107)
CO2 SERPL-SCNC: 21 MMOL/L (ref 21–32)
COLOR UR: ABNORMAL
CREAT SERPL-MCNC: 0.75 MG/DL (ref 0.5–1.05)
ERYTHROCYTE [DISTWIDTH] IN BLOOD BY AUTOMATED COUNT: 13.9 % (ref 11.5–14.5)
GFR SERPL CREATININE-BSD FRML MDRD: 90 ML/MIN/1.73M*2
GLUCOSE SERPL-MCNC: 92 MG/DL (ref 74–99)
GLUCOSE UR STRIP.AUTO-MCNC: NEGATIVE MG/DL
HCT VFR BLD AUTO: 40.3 % (ref 36–46)
HGB BLD-MCNC: 13.2 G/DL (ref 12–16)
HYALINE CASTS #/AREA URNS AUTO: ABNORMAL /LPF
KETONES UR STRIP.AUTO-MCNC: ABNORMAL MG/DL
LEUKOCYTE ESTERASE UR QL STRIP.AUTO: ABNORMAL
MCH RBC QN AUTO: 28.7 PG (ref 26–34)
MCHC RBC AUTO-ENTMCNC: 32.8 G/DL (ref 32–36)
MCV RBC AUTO: 88 FL (ref 80–100)
MUCOUS THREADS #/AREA URNS AUTO: ABNORMAL /LPF
NITRITE UR QL STRIP.AUTO: NEGATIVE
NRBC BLD-RTO: 0 /100 WBCS (ref 0–0)
PH UR STRIP.AUTO: 5 [PH]
PLATELET # BLD AUTO: 377 X10*3/UL (ref 150–450)
POTASSIUM SERPL-SCNC: 3.9 MMOL/L (ref 3.5–5.3)
PROT UR STRIP.AUTO-MCNC: ABNORMAL MG/DL
RBC # BLD AUTO: 4.6 X10*6/UL (ref 4–5.2)
RBC # UR STRIP.AUTO: NEGATIVE /UL
RBC #/AREA URNS AUTO: ABNORMAL /HPF
SODIUM SERPL-SCNC: 136 MMOL/L (ref 136–145)
SP GR UR STRIP.AUTO: 1.03
SQUAMOUS #/AREA URNS AUTO: ABNORMAL /HPF
UROBILINOGEN UR STRIP.AUTO-MCNC: <2 MG/DL
WBC # BLD AUTO: 7.3 X10*3/UL (ref 4.4–11.3)
WBC #/AREA URNS AUTO: >50 /HPF
WBC CLUMPS #/AREA URNS AUTO: ABNORMAL /HPF

## 2023-12-06 PROCEDURE — 87081 CULTURE SCREEN ONLY: CPT | Mod: PORLAB

## 2023-12-06 PROCEDURE — 87186 SC STD MICRODIL/AGAR DIL: CPT | Mod: PORLAB

## 2023-12-06 PROCEDURE — 93005 ELECTROCARDIOGRAM TRACING: CPT

## 2023-12-06 PROCEDURE — 87086 URINE CULTURE/COLONY COUNT: CPT | Mod: PORLAB

## 2023-12-06 PROCEDURE — 99214 OFFICE O/P EST MOD 30 MIN: CPT | Performed by: CLINICAL NURSE SPECIALIST

## 2023-12-06 PROCEDURE — 83036 HEMOGLOBIN GLYCOSYLATED A1C: CPT

## 2023-12-06 PROCEDURE — 71046 X-RAY EXAM CHEST 2 VIEWS: CPT | Performed by: STUDENT IN AN ORGANIZED HEALTH CARE EDUCATION/TRAINING PROGRAM

## 2023-12-06 PROCEDURE — 93010 ELECTROCARDIOGRAM REPORT: CPT | Performed by: INTERNAL MEDICINE

## 2023-12-06 PROCEDURE — 71046 X-RAY EXAM CHEST 2 VIEWS: CPT

## 2023-12-06 PROCEDURE — 85027 COMPLETE CBC AUTOMATED: CPT

## 2023-12-06 PROCEDURE — 80048 BASIC METABOLIC PNL TOTAL CA: CPT

## 2023-12-06 PROCEDURE — 36415 COLL VENOUS BLD VENIPUNCTURE: CPT

## 2023-12-06 PROCEDURE — 81001 URINALYSIS AUTO W/SCOPE: CPT

## 2023-12-06 RX ORDER — ASPIRIN 325 MG
325 TABLET ORAL DAILY
COMMUNITY

## 2023-12-06 RX ORDER — CETIRIZINE HYDROCHLORIDE 10 MG/1
10 TABLET ORAL
COMMUNITY

## 2023-12-06 RX ORDER — ASPIRIN 81 MG/1
81 TABLET ORAL DAILY
COMMUNITY
End: 2023-12-12 | Stop reason: ALTCHOICE

## 2023-12-06 RX ORDER — BUTYROSPERMUM PARKII(SHEA BUTTER), SIMMONDSIA CHINENSIS (JOJOBA) SEED OIL, ALOE BARBADENSIS LEAF EXTRACT .01; 1; 3.5 G/100G; G/100G; G/100G
250 LIQUID TOPICAL 2 TIMES DAILY
COMMUNITY
End: 2024-05-22 | Stop reason: ALTCHOICE

## 2023-12-06 ASSESSMENT — DUKE ACTIVITY SCORE INDEX (DASI)
CAN YOU DO MODERATE WORK AROUND THE HOUSE LIKE VACUUMING, SWEEPING FLOORS OR CARRYING GROCERIES: YES
DASI METS SCORE: 4.7
CAN YOU DO YARD WORK LIKE RAKING LEAVES, WEEDING OR PUSHING A MOWER: NO
CAN YOU HAVE SEXUAL RELATIONS: NO
CAN YOU CLIMB A FLIGHT OF STAIRS OR WALK UP A HILL: YES
CAN YOU DO HEAVY WORK AROUND THE HOUSE LIKE SCRUBBING FLOORS OR LIFTING AND MOVING HEAVY FURNITURE: NO
CAN YOU PARTICIPATE IN MODERATE RECREATIONAL ACTIVITIES LIKE GOLF, BOWLING, DANCING, DOUBLES TENNIS OR THROWING A BASEBALL OR FOOTBALL: NO
CAN YOU RUN A SHORT DISTANCE: NO
TOTAL_SCORE: 16.2
CAN YOU WALK A BLOCK OR TWO ON LEVEL GROUND: NO
CAN YOU WALK INDOORS, SUCH AS AROUND YOUR HOUSE: YES
CAN YOU TAKE CARE OF YOURSELF (EAT, DRESS, BATHE, OR USE TOILET): YES
CAN YOU PARTICIPATE IN STRENOUS SPORTS LIKE SWIMMING, SINGLES TENNIS, FOOTBALL, BASKETBALL, OR SKIING: NO
CAN YOU DO LIGHT WORK AROUND THE HOUSE LIKE DUSTING OR WASHING DISHES: YES

## 2023-12-06 ASSESSMENT — CHADS2 SCORE
DIABETES: YES
CHADS2 SCORE: 2
HYPERTENSION: YES
CHF: NO
PRIOR STROKE OR TIA OR THROMBOEMBOLISM: NO
AGE GREATER THAN OR EQUAL TO 75: NO

## 2023-12-06 ASSESSMENT — LIFESTYLE VARIABLES: SMOKING_STATUS: NONSMOKER

## 2023-12-06 NOTE — CPM/PAT H&P
CPM/PAT Evaluation       Name: Elysia Zuniga (Elsyia Zuniga)  /Age: 1960/63 y.o.     In-Person       Chief Complaint: Left hip pain, OA. Scheduled for left total hip replacement on 23 under spinal/general anesthesia per Dr. De Jesus.         Past Medical History:   Diagnosis Date    Acute lumbar radiculopathy 2023    Arthritis     De Quervain's tenosynovitis 2023    Depression     GERD (gastroesophageal reflux disease)     Obesity, Class III, BMI 40-49.9 (morbid obesity) (CMS/[facility]) 2023    Spinal stenosis of lumbar region without neurogenic claudication 2023       Past Surgical History:   Procedure Laterality Date    ENDOMETRIAL ABLATION      NOSE SURGERY         Patient Sexual activity questions deferred to the physician.    Family History   Problem Relation Name Age of Onset    Hypertension Mother      Heart disease Mother      Prostate cancer Father      Heart disease Maternal Grandmother      Diabetes Maternal Grandmother      Asthma Maternal Grandfather      COPD Maternal Grandfather      Heart disease Paternal Grandfather      Breast cancer Neg Hx         Allergies   Allergen Reactions    Bee Venom Protein (Honey Bee) Other and Shortness of breath    Mold Unknown    Penicillins Hives, Itching, Other and Swelling     Hives sob       Prior to Admission medications    Medication Sig Start Date End Date Taking? Authorizing Provider   acetaminophen (Tylenol 8 HOUR) 650 mg ER tablet Take 1 tablet (650 mg) by mouth. 3-4 Times Daily; Do not crush, chew, or split.    Historical Provider, MD   ascorbic acid (Vitamin C) 1,000 mg tablet Take 1 tablet (1,000 mg) by mouth once daily.    Historical Provider, MD   aspirin 325 mg tablet Take 1 tablet (325 mg) by mouth once daily.    Historical Provider, MD   aspirin 81 mg EC tablet Take 1 tablet (81 mg) by mouth once daily.    Historical Provider, MD   buPROPion XL (Wellbutrin XL) 150 mg 24 hr tablet Take 1 tablet (150 mg) by mouth once  daily in the morning. Do not crush, chew, or split. 9/11/23 9/10/24  Xiomara Shrestha MD   busPIRone (Buspar) 15 mg tablet Take 1 tablet (15 mg) by mouth 3 times a day as needed (anxiety or stress). 8/16/23 8/15/24  Xiomara Shrestha MD   cetirizine (ZyrTEC) 10 mg tablet 1 tablet (10 mg).    Historical Provider, MD   diclofenac sodium (Voltaren) 1 % gel gel Apply 1 Application topically 4 times a day as needed (pain). 9/11/23   Xiomara Shrestha MD   escitalopram (Lexapro) 10 mg tablet Take 1 tablet (10 mg) by mouth once daily. 9/11/23 9/10/24  Xiomara Shrestha MD   lysine HCL 1,000 mg tablet Take 1 tablet by mouth once daily.    Historical Provider, MD   magnesium salicylate/caffeine (DIUREX ORAL) Take by mouth continuously if needed.    Historical Provider, MD   melatonin 10 mg tablet,chewable Chew once daily at bedtime.    Historical Provider, MD   multivitamin tablet Take 1 tablet by mouth once daily.    Historical Provider, MD   naproxen sodium (Aleve) 220 mg capsule     Historical Provider, MD   RANITIDINE HCL ORAL Take 1 tablet by mouth once daily. 150 MG Tab    Historical Provider, MD   saccharomyces boulardii (Florastor) 250 mg capsule Take 1 capsule (250 mg) by mouth 2 times a day.    Historical Provider, MD          Visit Vitals  /87   Pulse 85   Temp 36.1 °C (97 °F) (Temporal)   Resp 18       DASI Risk Score      Flowsheet Row Most Recent Value   DASI SCORE 16.2   METS Score (Will be calculated only when all the questions are answered) 4.7          Caprini DVT Assessment      Flowsheet Row Most Recent Value   DVT Score 14   Current Status Elective major lower extremity arthroplasty, Major surgery planned, lasting 2-3 hours   History Prior major surgery   Age 60-75 years   BMI 41-50 (Morbid obesity)          Modified Frailty Index    No data to display       CHADS2 Stroke Risk  Current as of 7 minutes ago        N/A 3 - 100%: High Risk   2 - 3%: Medium Risk   0 - 2%: Low Risk      Last Change: N/A          This score determines the patient's risk of having a stroke if the patient has atrial fibrillation.        This score is not applicable to this patient. Components are not calculated.          Revised Cardiac Risk Index      Flowsheet Row Most Recent Value   Revised Cardiac Risk Calculator 0          Apfel Simplified Score      Flowsheet Row Most Recent Value   Apfel Simplified Score Calculator 3          Risk Analysis Index Results This Encounter    No data found in the last 1 encounters.       Stop Bang Score      Flowsheet Row Most Recent Value   Do you snore loudly? 1   Do you often feel tired or fatigued after your sleep? 1   Has anyone ever observed you stop breathing in your sleep? 1   Do you have or are you being treated for high blood pressure? 0   Recent BMI (Calculated) 42.4   Is BMI greater than 35 kg/m2? 1=Yes   Age older than 50 years old? 1=Yes   Is your neck circumference greater than 17 inches (Male) or 16 inches (Female)? 1   Gender - Male 0=No   STOP-BANG Total Score 6            Assessment and Plan:     Musculoskeletal:  Left hip pain, OA. Scheduled for left total hip replacement on 12/14/23 under spinal/general anesthesia per Dr. De Jesus. See PCP Dr. Shrestha H&P dated 11/30/23.   Patient had an EKG showing RBBB today in [facility] during visit. No comparable EKGs noted in [place] or [facility]. Had patient sign release to get previous EKGs from Dr. Erazo's office/ faxed PCP office urgent request. Patient states previous surgeon wanted her to get a cardiac clearance as they thought they saw an old heart attack on a previous EKG. Notified Dr. De Jesus and his nurse Som 12/6/23 at 1427pm that patient may need cardiac clearance prior to surgery.

## 2023-12-06 NOTE — PREPROCEDURE INSTRUCTIONS
Medication List            Accurate as of December 6, 2023 12:56 PM. Always use your most recent med list.                acetaminophen 650 mg ER tablet  Commonly known as: Tylenol 8 HOUR     Aleve 220 mg capsule  Generic drug: naproxen sodium     * aspirin 81 mg EC tablet     * aspirin 325 mg tablet     buPROPion  mg 24 hr tablet  Commonly known as: Wellbutrin XL  Take 1 tablet (150 mg) by mouth once daily in the morning. Do not crush, chew, or split.     busPIRone 15 mg tablet  Commonly known as: Buspar  Take 1 tablet (15 mg) by mouth 3 times a day as needed (anxiety or stress).     cetirizine 10 mg tablet  Commonly known as: ZyrTEC     diclofenac sodium 1 % gel gel  Commonly known as: Voltaren  Apply 1 Application topically 4 times a day as needed (pain).     DIUREX ORAL     escitalopram 10 mg tablet  Commonly known as: Lexapro  Take 1 tablet (10 mg) by mouth once daily.     lysine HCL 1,000 mg tablet     melatonin 10 mg tablet,chewable     multivitamin tablet     RANITIDINE HCL ORAL     saccharomyces boulardii 250 mg capsule  Commonly known as: Florastor     Vitamin C 1,000 mg tablet  Generic drug: ascorbic acid           * This list has 2 medication(s) that are the same as other medications prescribed for you. Read the directions carefully, and ask your doctor or other care provider to review them with you.                       NPO Instructions:     Do not drink any liquid after midnight the night before your surgery  Do not eat any food after midnight the night before your surgery/procedure.  Candy, gum, mints and smoking of cigarettes, marijuana or vaping is not permitted after midnight prior to your surgery   Do not drink Alcohol 24 hours prior to surgery      Increase fluid intake day before surgery    Additional Instructions:      Review your medication instructions, take indicated medications    Wear  comfortable loose fitting clothing  Do not use moisturizers, creams, lotions or perfume  All  jewelry and valuables should be left at home. May bring glasses and partials.    Stop blood thinning medications as instructed by ordering physician or surgeon    Shower or bathe the night before and day of surgery use antimicrobial scrub as ordered. Brush teeth and avoid perfumes, colognes, powders, makeup, aftershave and hair spray    Go to Registration, in the main lobby, upon arrival on the day of surgery and have 's license and medical insurance card available.    Please have a responsible adult to drive you home and be available to help you as needed after surgery.    Call [phone] the day before your surgery to find out what time you are to arrive.            NPO Instructions:    Do not eat any food after midnight the night before your surgery/procedure.    Additional Instructions:     The Day before Surgery:  Do not eat any food after Midnight  Review your medication instructions, take indicated medications

## 2023-12-07 LAB
ATRIAL RATE: 73 BPM
EST. AVERAGE GLUCOSE BLD GHB EST-MCNC: 128 MG/DL
HBA1C MFR BLD: 6.1 %
P AXIS: -14 DEGREES
PR INTERVAL: 150 MS
Q ONSET: 251 MS
QRS COUNT: 12 BEATS
QRS DURATION: 154 MS
QT INTERVAL: 395 MS
QTC CALCULATION(BAZETT): 433 MS
QTC FREDERICIA: 420 MS
R AXIS: 2 DEGREES
T AXIS: -34 DEGREES
T OFFSET: 449 MS
VENTRICULAR RATE: 72 BPM

## 2023-12-08 ENCOUNTER — HOSPITAL ENCOUNTER (OUTPATIENT)
Dept: RADIOLOGY | Facility: HOSPITAL | Age: 63
Discharge: HOME | End: 2023-12-08
Payer: MEDICARE

## 2023-12-08 ENCOUNTER — TELEPHONE (OUTPATIENT)
Dept: PRIMARY CARE | Facility: CLINIC | Age: 63
End: 2023-12-08
Payer: MEDICARE

## 2023-12-08 DIAGNOSIS — N39.0 URINARY TRACT INFECTION WITHOUT HEMATURIA, SITE UNSPECIFIED: Primary | ICD-10-CM

## 2023-12-08 DIAGNOSIS — R93.89 ABNORMAL CXR: ICD-10-CM

## 2023-12-08 DIAGNOSIS — R93.89 ABNORMAL CXR: Primary | ICD-10-CM

## 2023-12-08 PROBLEM — I45.10 RBBB: Status: ACTIVE | Noted: 2023-12-08

## 2023-12-08 LAB
BACTERIA UR CULT: ABNORMAL
STAPHYLOCOCCUS SPEC CULT: NORMAL

## 2023-12-08 PROCEDURE — 71250 CT THORAX DX C-: CPT | Mod: FR

## 2023-12-08 PROCEDURE — 71250 CT THORAX DX C-: CPT | Mod: FOREIGN READ | Performed by: RADIOLOGY

## 2023-12-08 RX ORDER — SULFAMETHOXAZOLE AND TRIMETHOPRIM 800; 160 MG/1; MG/1
1 TABLET ORAL 2 TIMES DAILY
Qty: 10 TABLET | Refills: 0 | Status: SHIPPED | OUTPATIENT
Start: 2023-12-08 | End: 2023-12-15 | Stop reason: HOSPADM

## 2023-12-08 NOTE — TELEPHONE ENCOUNTER
Called and left patient a detailed message with Dr. Perez instructions regarding her urine results and the Bactrim sent to her pharmacy.     I told the patient to give us a call back regarding her results.

## 2023-12-08 NOTE — RESULT ENCOUNTER NOTE
Received notification from Radiologist of critical finding on CXR. Attempted to call patient twice, left 2 voicemails for her to return call to go over these results. I am urgently referring her our Pulmonary group for evaluation of lingular lesion/cavitation and thick walled mass. I'm also ordering a stat chest CT to see if we can get that done before her pulmonary visit.     Additionally, I ordered a Cardiology referral for new RBBB/inferior involvement yesterday as well as noted the patient has a current UTI >100,000 e coli. Susceptibility is still pending. Patient will need to follow up with her PCP for UTI management.

## 2023-12-08 NOTE — TELEPHONE ENCOUNTER
Received message from Preop testing that she had positive UA with culture positive for enteric bacilli. No ID or sensitivity yet and will likely not come back until Monday.     I sent Bactrim to start today while we wait on the culture.     Also, they have been trying to get in touch with her and unable to discuss chest Xray. She has an abnormality that looks like could be infection of some kind, they have ordered a Stat CT chest. Please let her know she needs to get back in contact to get that scheduled.

## 2023-12-12 ENCOUNTER — APPOINTMENT (OUTPATIENT)
Dept: CARDIOLOGY | Facility: CLINIC | Age: 63
End: 2023-12-12
Payer: MEDICARE

## 2023-12-12 ENCOUNTER — OFFICE VISIT (OUTPATIENT)
Dept: PULMONOLOGY | Facility: HOSPITAL | Age: 63
End: 2023-12-12
Payer: MEDICARE

## 2023-12-12 ENCOUNTER — ANESTHESIA EVENT (OUTPATIENT)
Dept: OPERATING ROOM | Facility: HOSPITAL | Age: 63
End: 2023-12-12
Payer: MEDICARE

## 2023-12-12 ENCOUNTER — OFFICE VISIT (OUTPATIENT)
Dept: CARDIOLOGY | Facility: CLINIC | Age: 63
End: 2023-12-12
Payer: MEDICARE

## 2023-12-12 VITALS
SYSTOLIC BLOOD PRESSURE: 124 MMHG | BODY MASS INDEX: 41.79 KG/M2 | OXYGEN SATURATION: 93 % | HEIGHT: 66 IN | TEMPERATURE: 96.6 F | RESPIRATION RATE: 18 BRPM | HEART RATE: 83 BPM | DIASTOLIC BLOOD PRESSURE: 80 MMHG

## 2023-12-12 VITALS
OXYGEN SATURATION: 97 % | HEIGHT: 66 IN | BODY MASS INDEX: 42.59 KG/M2 | HEART RATE: 99 BPM | SYSTOLIC BLOOD PRESSURE: 130 MMHG | WEIGHT: 265 LBS | DIASTOLIC BLOOD PRESSURE: 68 MMHG

## 2023-12-12 DIAGNOSIS — R06.02 SHORTNESS OF BREATH: Primary | ICD-10-CM

## 2023-12-12 DIAGNOSIS — R91.1 LUNG NODULE: ICD-10-CM

## 2023-12-12 DIAGNOSIS — R06.83 SNORING: ICD-10-CM

## 2023-12-12 DIAGNOSIS — E66.01 OBESITY, CLASS III, BMI 40-49.9 (MORBID OBESITY) (MULTI): ICD-10-CM

## 2023-12-12 DIAGNOSIS — Z01.818 PRE-OP EVALUATION: ICD-10-CM

## 2023-12-12 DIAGNOSIS — E66.01 MORBID OBESITY WITH BMI OF 40.0-44.9, ADULT (MULTI): ICD-10-CM

## 2023-12-12 DIAGNOSIS — I45.10 RBBB (RIGHT BUNDLE BRANCH BLOCK): ICD-10-CM

## 2023-12-12 DIAGNOSIS — R06.81 WITNESSED APNEIC SPELLS: Primary | ICD-10-CM

## 2023-12-12 DIAGNOSIS — R05.3 CHRONIC COUGH: ICD-10-CM

## 2023-12-12 PROBLEM — M16.10 ARTHRITIS OF HIP: Status: ACTIVE | Noted: 2023-10-05

## 2023-12-12 PROCEDURE — 1036F TOBACCO NON-USER: CPT | Performed by: INTERNAL MEDICINE

## 2023-12-12 PROCEDURE — 3008F BODY MASS INDEX DOCD: CPT | Performed by: INTERNAL MEDICINE

## 2023-12-12 PROCEDURE — 99204 OFFICE O/P NEW MOD 45 MIN: CPT | Performed by: INTERNAL MEDICINE

## 2023-12-12 PROCEDURE — 1036F TOBACCO NON-USER: CPT | Performed by: NURSE PRACTITIONER

## 2023-12-12 PROCEDURE — 99214 OFFICE O/P EST MOD 30 MIN: CPT | Performed by: INTERNAL MEDICINE

## 2023-12-12 PROCEDURE — 99203 OFFICE O/P NEW LOW 30 MIN: CPT | Performed by: NURSE PRACTITIONER

## 2023-12-12 ASSESSMENT — ENCOUNTER SYMPTOMS
NERVOUS/ANXIOUS: 0
VOMITING: 0
ARTHRALGIAS: 1
DIARRHEA: 0
POLYDIPSIA: 0
CONSTIPATION: 0
CONFUSION: 0
FATIGUE: 1
WHEEZING: 0
HEADACHES: 0
JOINT SWELLING: 0
ABDOMINAL PAIN: 0
OCCASIONAL FEELINGS OF UNSTEADINESS: 0
COUGH: 1
DIFFICULTY URINATING: 0
PALPITATIONS: 0
TROUBLE SWALLOWING: 0
NAUSEA: 0
ACTIVITY CHANGE: 0
BACK PAIN: 1
POLYPHAGIA: 0
SHORTNESS OF BREATH: 1
UNEXPECTED WEIGHT CHANGE: 0
BLOOD IN STOOL: 0
DEPRESSION: 0
PALPITATIONS: 0
DYSPHORIC MOOD: 0
SHORTNESS OF BREATH: 1
APPETITE CHANGE: 0
SEIZURES: 0
LOSS OF SENSATION IN FEET: 0

## 2023-12-12 ASSESSMENT — PATIENT HEALTH QUESTIONNAIRE - PHQ9
SUM OF ALL RESPONSES TO PHQ9 QUESTIONS 1 AND 2: 0
2. FEELING DOWN, DEPRESSED OR HOPELESS: NOT AT ALL
1. LITTLE INTEREST OR PLEASURE IN DOING THINGS: NOT AT ALL

## 2023-12-12 ASSESSMENT — PAIN SCALES - GENERAL: PAINLEVEL: 0-NO PAIN

## 2023-12-12 ASSESSMENT — COLUMBIA-SUICIDE SEVERITY RATING SCALE - C-SSRS
2. HAVE YOU ACTUALLY HAD ANY THOUGHTS OF KILLING YOURSELF?: NO
1. IN THE PAST MONTH, HAVE YOU WISHED YOU WERE DEAD OR WISHED YOU COULD GO TO SLEEP AND NOT WAKE UP?: NO
6. HAVE YOU EVER DONE ANYTHING, STARTED TO DO ANYTHING, OR PREPARED TO DO ANYTHING TO END YOUR LIFE?: NO

## 2023-12-12 NOTE — H&P (VIEW-ONLY)
Elysia Zuniga is a 63 y.o. female     History Of Present Illness   Ms Zuniga is a 63 year old mobidly obese female with a PMH of hyperlipidemia, RBBB, major depression,GERD,  osteoarthritis, chronic back pain, who is here for cardiac clearance for hip surgery.    She is complaining of shortness of breath and chronic lymphedema.  She denies any complaints of chest pain, palpitations, dizziness or syncope.    She denies any H/O CAD, CHF, MI, CVA, TIA, CKD, DM OR ANY BLEEDING OR CLOTTING DISORDERS      Social HX  Social History     Tobacco Use    Smoking status: Never    Smokeless tobacco: Never   Vaping Use    Vaping Use: Never used   Substance Use Topics    Alcohol use: Not Currently     Comment: once in a while    Drug use: Not Currently     Types: Marijuana     Comment: marijuana in the past for pain          Family HX  Family History   Problem Relation Name Age of Onset    Hypertension Mother      Heart disease Mother      Prostate cancer Father      Heart disease Maternal Grandmother      Diabetes Maternal Grandmother      Asthma Maternal Grandfather      COPD Maternal Grandfather      Heart disease Paternal Grandfather      Breast cancer Neg Hx            Review Of Systems   Review of Systems   Constitutional:  Positive for activity change. Negative for chills, diaphoresis, fatigue and fever.   Eyes: Negative.    Respiratory:  Positive for shortness of breath.    Cardiovascular:  Positive for leg swelling. Negative for chest pain and palpitations.   Gastrointestinal: Negative.    Endocrine: Negative.    Genitourinary: Negative.    Musculoskeletal:  Positive for arthralgias and gait problem.   Allergic/Immunologic: Negative.    Neurological:  Negative for dizziness, tremors, syncope and weakness.   Hematological: Negative.    Psychiatric/Behavioral: Negative.            Allergies  Allergies   Allergen Reactions    Bee Venom Protein (Honey Bee) Other and Shortness of breath    Mold Unknown    Penicillins  Hives, Itching, Other and Swelling     Hives sob          Vitals  There were no vitals taken for this visit.        Physical Exam  Physical Exam  Constitutional:       Appearance: Normal appearance. She is obese.   HENT:      Head: Normocephalic and atraumatic.      Nose: Nose normal.      Mouth/Throat:      Mouth: Mucous membranes are moist.      Pharynx: Oropharynx is clear.   Cardiovascular:      Rate and Rhythm: Normal rate and regular rhythm.      Pulses: Normal pulses.      Heart sounds: No murmur heard.     Friction rub present. No gallop.   Pulmonary:      Effort: Pulmonary effort is normal. No respiratory distress.      Breath sounds: Normal breath sounds. No stridor. No wheezing, rhonchi or rales.   Chest:      Chest wall: No tenderness.   Abdominal:      General: Bowel sounds are normal.      Palpations: Abdomen is soft.   Musculoskeletal:         General: Tenderness present.      Cervical back: Normal range of motion and neck supple.      Right lower leg: Edema present.      Left lower leg: Edema present.   Skin:     General: Skin is warm and dry.      Coloration: Skin is pale.   Neurological:      Mental Status: She is alert and oriented to person, place, and time. Mental status is at baseline.   Psychiatric:         Mood and Affect: Mood normal.         Behavior: Behavior normal.         Thought Content: Thought content normal.         Judgment: Judgment normal.            Current/Home Meds    Current Outpatient Medications:     acetaminophen (Tylenol 8 HOUR) 650 mg ER tablet, Take 1 tablet (650 mg) by mouth. 3-4 Times Daily; Do not crush, chew, or split., Disp: , Rfl:     ascorbic acid (Vitamin C) 1,000 mg tablet, Take 1 tablet (1,000 mg) by mouth once daily., Disp: , Rfl:     aspirin 325 mg tablet, Take 1 tablet (325 mg) by mouth once daily., Disp: , Rfl:     aspirin 81 mg EC tablet, Take 1 tablet (81 mg) by mouth once daily., Disp: , Rfl:     buPROPion XL (Wellbutrin XL) 150 mg 24 hr tablet, Take 1  tablet (150 mg) by mouth once daily in the morning. Do not crush, chew, or split., Disp: 90 tablet, Rfl: 3    busPIRone (Buspar) 15 mg tablet, Take 1 tablet (15 mg) by mouth 3 times a day as needed (anxiety or stress)., Disp: 90 tablet, Rfl: 11    cetirizine (ZyrTEC) 10 mg tablet, 1 tablet (10 mg)., Disp: , Rfl:     diclofenac sodium (Voltaren) 1 % gel gel, Apply 1 Application topically 4 times a day as needed (pain)., Disp: 450 g, Rfl: 2    escitalopram (Lexapro) 10 mg tablet, Take 1 tablet (10 mg) by mouth once daily., Disp: 90 tablet, Rfl: 3    lysine HCL 1,000 mg tablet, Take 1 tablet by mouth once daily., Disp: , Rfl:     magnesium salicylate/caffeine (DIUREX ORAL), Take by mouth continuously if needed., Disp: , Rfl:     melatonin 10 mg tablet,chewable, Chew once daily at bedtime., Disp: , Rfl:     multivitamin tablet, Take 1 tablet by mouth once daily., Disp: , Rfl:     naproxen sodium (Aleve) 220 mg capsule, , Disp: , Rfl:     RANITIDINE HCL ORAL, Take 1 tablet by mouth once daily. 150 MG Tab, Disp: , Rfl:     saccharomyces boulardii (Florastor) 250 mg capsule, Take 1 capsule (250 mg) by mouth 2 times a day., Disp: , Rfl:     sulfamethoxazole-trimethoprim (Bactrim DS) 800-160 mg tablet, Take 1 tablet by mouth 2 times a day for 5 days., Disp: 10 tablet, Rfl: 0       Labs  4/7/22  CHOLESTEROL 270  HDL 38.6        12/6/23  0.75  K 3.9         EKG Findings  EKG: NSR with a RBBB        Cardiac Service Results:      Assessment/Plan      PREOPERATIVE CLEARANCE:  EKG shows NSR with a RBBB.  BP is 130/68.  HR is 99 and regular.  Her BMI is 42.77.  She is complaining of significant shortness of breath and bilateral lower extremity edema.  She states she has had lymphedema for several years.  She is scheduled for hip surgery on Thursday.  Will schedule her for a stat echo for further evaluation of her EF.  I will call her with results.

## 2023-12-12 NOTE — PATIENT INSTRUCTIONS
-repeat CT chest in 12 months for low risk 6mm lung nodule in the left upper lobe  -home sleep study will be scheduled  -work with PCP on GERD symptoms and consider changing to nasal steroid spray for better control of allergic rhinitis  -ok to continue with surgery but recommend using CPAP 10cwp in post op period and with sleep while in the hospital.  Recommend DVT proph and early ambulation.    -follow up with Dr. Gandhi after sleep study

## 2023-12-12 NOTE — PREPROCEDURE INSTRUCTIONS
Pre-op Teaching completed  Provided patient with education handbook, Chlorhexidine Wash and MRSA swabbing education  Patient attended Pre-OP Teaching Class which includes the following  1.  Anatomy of Hip/Knee   2. Potential complications/signs and symptoms  3. Incentive spirometer (cough and deep breathing techniques)  4. Ankle pumping and toe wiggling/BETO Hose  5. Importance of changing positions and getting OOB DOS  6. Importance of beto hose/SCD's/foot pumps  7. Adequate intake and output  8. Hydration; by mouth and IV fluids  9. Activity (up out of bed DOS, walking, chair, PT Gym, dining room)  10. Pain management; IM/IV/PO pain meds, muscle relaxants, Toradol  11. Anti-Coagulation: Coumadin/lovenox , Xeralto/Eliquis or  Aspirin  12. 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 Metal Detectors  14. Self-Care goals (pain management, hygiene, infection prevention, tobacco cessation, glycemic control, weight management)  15. Education and self-care of co-morbidities including HTN, DM, WESLEY, COPD and chronic pain  PT/OT teaching (knee precautions, hip precautions anterior vs. posterior approach, life style alteration, and pre-op exercises given)  Anesthesia teaching with Adductor canal block for TKR  Care Coordination, evaluation/discharge planning/RAPT assessment completed  Attend pre admission testing (has date set)  Questions and concerns addressed  Discharge expectation and outcomes explained along with LOS  Patient verbalizes understanding of all pre-op education and discharge outcomes   Patient to arrive to the Unit the day of surgery   Date of class on file with orthopedic coordinator

## 2023-12-12 NOTE — PROGRESS NOTES
PULMONARY CONSULT INITIAL VISIT      REASON FOR CONSULT:  I have been asked by Dr. Shrestha to see this patient, Elysia Zuniga, in consultation for evaluation of abnormal CXR    Chief Complaint:    Chief Complaint   Patient presents with    Abnormal CT Scan     Patient here for new patient visit. Patient here for an abnormal CT scan. She needs cleared before surgery on Thursday. Patient states her breathing is okay. Patient gets shortness of breath on exertion. Patient admits to an occasional dry and productive cough with white mucous. Patient denies any oxygen or cpap use at home. Patient is not a current smoker. Patient only smoked on occasion in high school. Patient has dogs and a cat. Patient denies any concerning working history.        History of Present Illness:  Elysia Zuniga is an 63 y.o. female never smoker, presenting for abnormal CXR findings which were not confirmed by CT.  CXR was don for pre-op evaluation for left hip replacement.  No h/o cardiac or pulmonary issues.  She reports frequent bronchitis in the past but that has not been a problem in the last few years.  She has been told that she snores and stops breathing at times when she sleep.  She is always tired and feels like she does not get good quality sleep.  She does have a chronic cough which is worse in the winter.  It does not bother her but she says sometimes it bothers other people.  It is usually worse in the evening which will come on for a few minute and then stops.  She does have allergic rhinitis.  She takes zertec for allergies which she takes every day.  She does have GERD symptoms and has been taking prilosec and famotidine at home.  She does reports some JORGENSEN with emptying groceries from her car for the past couple years but has been very limited by her hip pain.      Past Medical History:    Past Medical History:   Diagnosis Date    Acute lumbar radiculopathy 08/16/2023    Arthritis     De Quervain's tenosynovitis  08/16/2023    Depression     GERD (gastroesophageal reflux disease)     Obesity, Class III, BMI 40-49.9 (morbid obesity) (CMS/[facility]) 11/30/2023    Spinal stenosis of lumbar region without neurogenic claudication 08/16/2023       Past Surgical History:    Past Surgical History:   Procedure Laterality Date    ENDOMETRIAL ABLATION      NOSE SURGERY         Social History:  Social History     Socioeconomic History    Marital status: Single     Spouse name: Not on file    Number of children: Not on file    Years of education: Not on file    Highest education level: Not on file   Occupational History    Not on file   Tobacco Use    Smoking status: Never    Smokeless tobacco: Never   Vaping Use    Vaping Use: Never used   Substance and Sexual Activity    Alcohol use: Not Currently     Comment: once in a while    Drug use: Not Currently     Types: Marijuana     Comment: marijuana in the past for pain    Sexual activity: Defer   Other Topics Concern    Not on file   Social History Narrative    Not on file     Social Determinants of Health     Financial Resource Strain: Not on file   Food Insecurity: Not on file   Transportation Needs: Not on file   Physical Activity: Not on file   Stress: Not on file   Social Connections: Not on file   Intimate Partner Violence: Not on file   Housing Stability: Not on file       Family History:  family history includes Asthma in her maternal grandfather; COPD in her maternal grandfather; Diabetes in her maternal grandmother; Heart disease in her maternal grandmother, mother, and paternal grandfather; Hypertension in her mother; Prostate cancer in her father.    Allergies:  is allergic to bee venom protein (honey bee), mold, and penicillins.    Home Medications:      Current Outpatient Medications:     acetaminophen (Tylenol 8 HOUR) 650 mg ER tablet, Take 1 tablet (650 mg) by mouth. 3-4 Times Daily; Do not crush, chew, or split., Disp: , Rfl:     ascorbic acid (Vitamin C) 1,000 mg tablet, Take  1 tablet (1,000 mg) by mouth once daily., Disp: , Rfl:     aspirin 325 mg tablet, Take 1 tablet (325 mg) by mouth once daily., Disp: , Rfl:     aspirin 81 mg EC tablet, Take 1 tablet (81 mg) by mouth once daily., Disp: , Rfl:     buPROPion XL (Wellbutrin XL) 150 mg 24 hr tablet, Take 1 tablet (150 mg) by mouth once daily in the morning. Do not crush, chew, or split., Disp: 90 tablet, Rfl: 3    busPIRone (Buspar) 15 mg tablet, Take 1 tablet (15 mg) by mouth 3 times a day as needed (anxiety or stress)., Disp: 90 tablet, Rfl: 11    cetirizine (ZyrTEC) 10 mg tablet, 1 tablet (10 mg)., Disp: , Rfl:     diclofenac sodium (Voltaren) 1 % gel gel, Apply 1 Application topically 4 times a day as needed (pain)., Disp: 450 g, Rfl: 2    escitalopram (Lexapro) 10 mg tablet, Take 1 tablet (10 mg) by mouth once daily., Disp: 90 tablet, Rfl: 3    lysine HCL 1,000 mg tablet, Take 1 tablet by mouth once daily., Disp: , Rfl:     magnesium salicylate/caffeine (DIUREX ORAL), Take by mouth continuously if needed., Disp: , Rfl:     melatonin 10 mg tablet,chewable, Chew once daily at bedtime., Disp: , Rfl:     multivitamin tablet, Take 1 tablet by mouth once daily., Disp: , Rfl:     naproxen sodium (Aleve) 220 mg capsule, , Disp: , Rfl:     RANITIDINE HCL ORAL, Take 1 tablet by mouth once daily. 150 MG Tab, Disp: , Rfl:     saccharomyces boulardii (Florastor) 250 mg capsule, Take 1 capsule (250 mg) by mouth 2 times a day., Disp: , Rfl:     sulfamethoxazole-trimethoprim (Bactrim DS) 800-160 mg tablet, Take 1 tablet by mouth 2 times a day for 5 days., Disp: 10 tablet, Rfl: 0    I have reviewed the past medical history, past surgical history, family history, social history, as noted above, and review of systems as noted above.    Review of Systems:  Pertinent items are noted in HPI.  12 point review of systems reviewed and are negative except for as mentioned in HPI.        OBJECTIVE:    Vitals:    /80   Pulse 83   Temp 35.9 °C (96.6  "°F)   Resp 18   Ht 1.676 m (5' 6\")   SpO2 93% Comment: RA  BMI 41.79 kg/m²     General Appearance:  Alert, cooperative, no distress or conversational dyspnea, appears stated age  Head:  Normocephalic, without obvious abnormality, atraumatic  Eyes:  PERRL, conjunctiva/corneas clear, EOM's intact  Nose:  Nares normal, septum midline, mucosa normal, no drainage or sinus tenderness  Mouth:  Lips, mucosa, and tongue normal; teeth and gums normal, no thrush  Neck:  Supple, symmetrical, trachea midline, no cervical, supraclavicular, and axillary adenopathy; no JVD, not using accessory muscles to breath  Lungs:    Clear to auscultation bilaterally, respirations unlabored, good air movement  Chest wall:  No tenderness or deformity, chest expands symmetrically  Heart:  Regular rate and rhythm, S1 and S2 normal, no murmur, rub or gallop  Abdomen:    Soft, non-tender, non-distended, bs active, no masses, no organomegaly  Extremities:  Extremities atraumatic, no edema, no cyanosis or clubbing, 2+ pulses in all extremities  Skin:  Skin color, texture, turgor normal, no rashes or lesions  Neurologic:  CNII-XII intact. Normal strength, sensation      Data:    Labs reviewed in [facility]      Imaging:  Reviewed    CXR 2-view 12/6/23: Possible lingular cavitation with nonspecific thick-walled lesion.  Consider further evaluation with chest CT.      CT chest without IV contrast 12/8/23:   There is a 6 mm noncalcified subpleural nodule in the posterior left  upper lobe and some linear scarring or discoid atelectasis in the  right middle lobe and inferior left lingula but the lungs otherwise  are clear.  No lung cysts are appreciated.  There is a moderate  retrocardiac hiatal hernia.      ASSESSMENT  64yo never smoker with incidentally found 6mm subpleural ARLYN nodule, low risk patient     Malignancy risk by Kelvin carlin 2.4% (Low risk)    ESS 8/24, poor sleep quality, snoring and witness apnea- high risk for sleep apnea    Chronic " cough likely from a combination of poorly controlled allergic rhinitis with post nasal drip and GERD    PLAN    -repeat CT chest in 12 months  -home sleep study  -work with PCP on GERD symptoms and consider changing to nasal steroid spray for better control of allergic rhinitis  -ok to continue with surgery but recommend using CPAP 10cwp in post op period and with sleep while in the hospital.  Recommend DVT proph and early ambulation.    -follow up with Dr. Gandhi after sleep study      Orders Placed This Encounter   Procedures    Home sleep apnea test (HSAT)     Order Specific Question:   Where will this be performed     Answer:   [facility]     Order Specific Question:   Release result to [facility]     Answer:   Immediate       I have spent a total of 50 minutes including reviewing the chart, documenting, viewing imaging, seeing the patient, and discussing the treatment plan and diagnosis with the patient.    New 30-44, 45-59, 60-74      Established 20-29, 30-39, 40-54    Prasanna Claudio MD  12/12/2023  8:58 AM

## 2023-12-12 NOTE — PRE-PROCEDURE NOTE
HIP Injury and Osteoarthritis [facility]    Pain  What amount of hip pain have you experienced the last week during the following activities?   Going up or down stairs  ___ None ___ Mild __X_ Moderate ___ Severe ___ Extreme    Walking on uneven surface  ___ None ___ Mild __X_ Moderate ___ Severe ___ Extreme    Function, daily living  Rising from sitting  ___ None ___ Mild _X__ Moderate ___ Severe ___ Extreme    Bending to floor/ an object  ___ None ___ Mild ___ Moderate _X__ Severe ___ Extreme    Lying in bed (turning over, maintaining hip position)         ___ None ___ Mild ___ Moderate _X__ Severe ___ Extreme     Sitting  ___ None ___ Mild __X_ Moderate ___ Severe ___ Extreme   Questionnaire reviewed with patient and all answers are within normal limits. Allergy injections were administered and Lady waited in the clinic waiting room for 30 minutes following administration and was discharged without adverse reactions noted. See Allergy Immunotherapy for injection flow sheet for documentation of injection(s)

## 2023-12-13 ENCOUNTER — HOSPITAL ENCOUNTER (OUTPATIENT)
Dept: CARDIOLOGY | Facility: CLINIC | Age: 63
Discharge: HOME | End: 2023-12-13
Payer: MEDICARE

## 2023-12-13 ENCOUNTER — APPOINTMENT (OUTPATIENT)
Dept: PRIMARY CARE | Facility: CLINIC | Age: 63
End: 2023-12-13
Payer: MEDICARE

## 2023-12-13 ENCOUNTER — DOCUMENTATION (OUTPATIENT)
Dept: CARDIOLOGY | Facility: CLINIC | Age: 63
End: 2023-12-13

## 2023-12-13 DIAGNOSIS — I45.10 RBBB (RIGHT BUNDLE BRANCH BLOCK): ICD-10-CM

## 2023-12-13 DIAGNOSIS — I45.19 OTHER RIGHT BUNDLE-BRANCH BLOCK: ICD-10-CM

## 2023-12-13 DIAGNOSIS — E66.01 OBESITY, CLASS III, BMI 40-49.9 (MORBID OBESITY) (MULTI): ICD-10-CM

## 2023-12-13 DIAGNOSIS — Z01.818 PRE-OP EVALUATION: ICD-10-CM

## 2023-12-13 DIAGNOSIS — R06.02 SHORTNESS OF BREATH: ICD-10-CM

## 2023-12-13 PROBLEM — M16.10 ARTHRITIS OF HIP: Status: ACTIVE | Noted: 2023-10-05

## 2023-12-13 LAB
EJECTION FRACTION APICAL 4 CHAMBER: 55.3
EJECTION FRACTION: 57
LEFT ATRIUM VOLUME AREA LENGTH INDEX BSA: 24
LEFT VENTRICLE INTERNAL DIMENSION DIASTOLE: 5.4 (ref 3.5–6)
LEFT VENTRICULAR OUTFLOW TRACT DIAMETER: 2
MITRAL VALVE E/A RATIO: 0.8
MITRAL VALVE E/E' RATIO: 10.83
RIGHT VENTRICLE FREE WALL PEAK S': 15.2
TRICUSPID ANNULAR PLANE SYSTOLIC EXCURSION: 2.7

## 2023-12-13 PROCEDURE — 93306 TTE W/DOPPLER COMPLETE: CPT

## 2023-12-13 PROCEDURE — 93306 TTE W/DOPPLER COMPLETE: CPT | Performed by: INTERNAL MEDICINE

## 2023-12-13 ASSESSMENT — ENCOUNTER SYMPTOMS
ACTIVITY CHANGE: 1
ARTHRALGIAS: 1
CHILLS: 0
FEVER: 0
WEAKNESS: 0
DIZZINESS: 0
PSYCHIATRIC NEGATIVE: 1
HEMATOLOGIC/LYMPHATIC NEGATIVE: 1
FATIGUE: 0
ENDOCRINE NEGATIVE: 1
DIAPHORESIS: 0
GASTROINTESTINAL NEGATIVE: 1
TREMORS: 0
ALLERGIC/IMMUNOLOGIC NEGATIVE: 1
EYES NEGATIVE: 1

## 2023-12-13 NOTE — PROGRESS NOTES
"Elysia Zuniga is a 63 y.o. female              Vitals  There were no vitals taken for this visit.        Physical Exam  Physical Exam       Current/Home Meds    Current Outpatient Medications:     acetaminophen (Tylenol 8 HOUR) 650 mg ER tablet, Take 1 tablet (650 mg) by mouth. 3-4 Times Daily; Do not crush, chew, or split., Disp: , Rfl:     ascorbic acid (Vitamin C) 1,000 mg tablet, Take 1 tablet (1,000 mg) by mouth once daily., Disp: , Rfl:     aspirin 325 mg tablet, Take 1 tablet (325 mg) by mouth once daily., Disp: , Rfl:     buPROPion XL (Wellbutrin XL) 150 mg 24 hr tablet, Take 1 tablet (150 mg) by mouth once daily in the morning. Do not crush, chew, or split., Disp: 90 tablet, Rfl: 3    busPIRone (Buspar) 15 mg tablet, Take 1 tablet (15 mg) by mouth 3 times a day as needed (anxiety or stress)., Disp: 90 tablet, Rfl: 11    cetirizine (ZyrTEC) 10 mg tablet, 1 tablet (10 mg)., Disp: , Rfl:     diclofenac sodium (Voltaren) 1 % gel gel, Apply 1 Application topically 4 times a day as needed (pain)., Disp: 450 g, Rfl: 2    escitalopram (Lexapro) 10 mg tablet, Take 1 tablet (10 mg) by mouth once daily., Disp: 90 tablet, Rfl: 3    melatonin 10 mg tablet,chewable, Chew once daily at bedtime., Disp: , Rfl:     multivitamin tablet, Take 1 tablet by mouth once daily., Disp: , Rfl:     RANITIDINE HCL ORAL, Take 1 tablet by mouth once daily. 150 MG Tab, Disp: , Rfl:     saccharomyces boulardii (Florastor) 250 mg capsule, Take 1 capsule (250 mg) by mouth 2 times a day., Disp: , Rfl:     sulfamethoxazole-trimethoprim (Bactrim DS) 800-160 mg tablet, Take 1 tablet by mouth 2 times a day for 5 days., Disp: 10 tablet, Rfl: 0       Labs           EKG Findings  EKG: {ekg findings:145489::\"normal EKG, normal sinus rhythm\",\"unchanged from previous tracings\"}.    {cardiac diagnosis history:62247}.        Cardiac Service Results:  No results found for this or any previous visit from the past 365 days.        Assessment/Plan  " "    {Assess/Plan SmartLinks (Optional):62387::\"***\"}    "

## 2023-12-13 NOTE — PROGRESS NOTES
Subjective   Patient ID: Elysia Zuniga is a 63 y.o. female who presents for Medicare Wellness.    HPI     Patient Care Team:  Xiomara Shrestha MD as PCP - General (Family Medicine)  AMIE Wilkerson as Nurse Practitioner (Cardiology)  Aaron Gandhi MD as Surgeon (Pulmonary Disease)  Natanael De Jesus MD as Consulting Physician (Orthopaedic Surgery)    Patient Active Problem List   Diagnosis    Lumbar spondylolysis    Mixed hyperlipidemia    Major depressive disorder, single episode, moderate degree (CMS/[facility])    Arthritis of left hip    Spinal stenosis of lumbar region without neurogenic claudication    Other abnormal glucose    Obesity, Class III, BMI 40-49.9 (morbid obesity) (CMS/[facility])    Pre-op evaluation    Abnormal CXR    RBBB    UTI (urinary tract infection)    Arthritis of hip         Current Outpatient Medications:     acetaminophen (Tylenol 8 HOUR) 650 mg ER tablet, Take 1 tablet (650 mg) by mouth. 3-4 Times Daily; Do not crush, chew, or split., Disp: , Rfl:     ascorbic acid (Vitamin C) 1,000 mg tablet, Take 1 tablet (1,000 mg) by mouth once daily., Disp: , Rfl:     aspirin 325 mg tablet, Take 1 tablet (325 mg) by mouth once daily., Disp: , Rfl:     buPROPion XL (Wellbutrin XL) 150 mg 24 hr tablet, Take 1 tablet (150 mg) by mouth once daily in the morning. Do not crush, chew, or split., Disp: 90 tablet, Rfl: 3    busPIRone (Buspar) 15 mg tablet, Take 1 tablet (15 mg) by mouth 3 times a day as needed (anxiety or stress)., Disp: 90 tablet, Rfl: 11    cetirizine (ZyrTEC) 10 mg tablet, 1 tablet (10 mg)., Disp: , Rfl:     diclofenac sodium (Voltaren) 1 % gel gel, Apply 1 Application topically 4 times a day as needed (pain)., Disp: 450 g, Rfl: 2    escitalopram (Lexapro) 10 mg tablet, Take 1 tablet (10 mg) by mouth once daily., Disp: 90 tablet, Rfl: 3    melatonin 10 mg tablet,chewable, Chew once daily at bedtime., Disp: , Rfl:     multivitamin tablet, Take 1 tablet by mouth once daily.,  Disp: , Rfl:     RANITIDINE HCL ORAL, Take 1 tablet by mouth once daily. 150 MG Tab, Disp: , Rfl:     saccharomyces boulardii (Florastor) 250 mg capsule, Take 1 capsule (250 mg) by mouth 2 times a day., Disp: , Rfl:     sulfamethoxazole-trimethoprim (Bactrim DS) 800-160 mg tablet, Take 1 tablet by mouth 2 times a day for 5 days., Disp: 10 tablet, Rfl: 0    Past Medical, Surgical, Family, Social, and Substance Histories Reviewed.     Medicare Wellness Questionnaires and Histories in Nursing Notes Reviewed.     Review of Systems   Constitutional:  Positive for fatigue. Negative for activity change, appetite change and unexpected weight change.   HENT:  Negative for dental problem, hearing loss and trouble swallowing.    Eyes:  Negative for visual disturbance.   Respiratory:  Positive for cough and shortness of breath. Negative for wheezing.    Cardiovascular:  Negative for chest pain, palpitations and leg swelling.   Gastrointestinal:  Negative for abdominal pain, blood in stool, constipation, diarrhea, nausea and vomiting.   Endocrine: Negative for cold intolerance, heat intolerance, polydipsia, polyphagia and polyuria.   Genitourinary:  Negative for difficulty urinating and menstrual problem.   Musculoskeletal:  Positive for arthralgias and back pain. Negative for joint swelling.   Neurological:  Negative for seizures, syncope and headaches.   Psychiatric/Behavioral:  Negative for confusion and dysphoric mood. The patient is not nervous/anxious.        Objective   There were no vitals taken for this visit.    Physical Exam  Constitutional:       Appearance: Normal appearance.   HENT:      Head: Normocephalic and atraumatic.      Right Ear: Tympanic membrane normal.      Left Ear: Tympanic membrane normal.      Nose: Nose normal.      Mouth/Throat:      Pharynx: Oropharynx is clear.   Eyes:      Extraocular Movements: Extraocular movements intact.      Conjunctiva/sclera: Conjunctivae normal.      Pupils: Pupils are  equal, round, and reactive to light.   Neck:      Vascular: No carotid bruit.   Cardiovascular:      Rate and Rhythm: Normal rate and regular rhythm.      Pulses: Normal pulses.      Heart sounds: No murmur heard.  Pulmonary:      Effort: Pulmonary effort is normal.      Breath sounds: Normal breath sounds.   Abdominal:      Palpations: There is no hepatomegaly, splenomegaly or mass.      Tenderness: There is no abdominal tenderness.   Musculoskeletal:         General: Normal range of motion.      Cervical back: Normal range of motion.      Left lower leg: No edema.   Skin:     General: Skin is warm and dry.      Findings: No rash.   Neurological:      General: No focal deficit present.      Mental Status: She is alert. Mental status is at baseline.      Gait: Gait is intact.   Psychiatric:         Mood and Affect: Mood normal.         Thought Content: Thought content normal.       Recent Results (from the past 672 hour(s))   Basic Metabolic Panel    Collection Time: 12/06/23 12:52 PM   Result Value Ref Range    Glucose 92 74 - 99 mg/dL    Sodium 136 136 - 145 mmol/L    Potassium 3.9 3.5 - 5.3 mmol/L    Chloride 106 98 - 107 mmol/L    Bicarbonate 21 21 - 32 mmol/L    Anion Gap 13 10 - 20 mmol/L    Urea Nitrogen 23 6 - 23 mg/dL    Creatinine 0.75 0.50 - 1.05 mg/dL    eGFR 90 >60 mL/min/1.73m*2    Calcium 9.3 8.6 - 10.3 mg/dL   CBC    Collection Time: 12/06/23 12:52 PM   Result Value Ref Range    WBC 7.3 4.4 - 11.3 x10*3/uL    nRBC 0.0 0.0 - 0.0 /100 WBCs    RBC 4.60 4.00 - 5.20 x10*6/uL    Hemoglobin 13.2 12.0 - 16.0 g/dL    Hematocrit 40.3 36.0 - 46.0 %    MCV 88 80 - 100 fL    MCH 28.7 26.0 - 34.0 pg    MCHC 32.8 32.0 - 36.0 g/dL    RDW 13.9 11.5 - 14.5 %    Platelets 377 150 - 450 x10*3/uL   Staphylococcus aureus/MRSA colonization, Culture    Collection Time: 12/06/23 12:52 PM    Specimen: Nares/Axilla/Groin; Swab   Result Value Ref Range    Staph/MRSA Screen Culture No Staphylococcus aureus isolated    Hemoglobin  A1C    Collection Time: 12/06/23 12:52 PM   Result Value Ref Range    Hemoglobin A1C 6.1 (H) see below %    Estimated Average Glucose 128 Not Established mg/dL   Urinalysis with Reflex Microscopic and Culture    Collection Time: 12/06/23 12:52 PM   Result Value Ref Range    Color, Urine Nikki (N) Straw, Yellow    Appearance, Urine Hazy (N) Clear    Specific Gravity, Urine 1.028 1.005 - 1.035    pH, Urine 5.0 5.0, 5.5, 6.0, 6.5, 7.0, 7.5, 8.0    Protein, Urine 30 (1+) (N) NEGATIVE mg/dL    Glucose, Urine NEGATIVE NEGATIVE mg/dL    Blood, Urine NEGATIVE NEGATIVE    Ketones, Urine 5 (TRACE) (A) NEGATIVE mg/dL    Bilirubin, Urine NEGATIVE NEGATIVE    Urobilinogen, Urine <2.0 <2.0 mg/dL    Nitrite, Urine NEGATIVE NEGATIVE    Leukocyte Esterase, Urine MODERATE (2+) (A) NEGATIVE   Microscopic Only, Urine    Collection Time: 12/06/23 12:52 PM   Result Value Ref Range    WBC, Urine >50 (A) 1-5, NONE /HPF    WBC Clumps, Urine OCCASIONAL Reference range not established. /HPF    RBC, Urine 3-5 NONE, 1-2, 3-5 /HPF    Squamous Epithelial Cells, Urine 26-50 (1+) Reference range not established. /HPF    Bacteria, Urine 4+ (A) NONE SEEN /HPF    Mucus, Urine 2+ Reference range not established. /LPF    Hyaline Casts, Urine 1+ (A) NONE /LPF   Urine Culture    Collection Time: 12/06/23 12:52 PM    Specimen: Clean Catch/Voided; Urine   Result Value Ref Range    Urine Culture >100,000 Escherichia coli (A)        Susceptibility    Escherichia coli - MICROSCAN     Ampicillin  Resistant      Amoxicillin/Clavulanate  Susceptible      Ampicillin/Sulbactam  Resistant      Cefazolin  Susceptible      Cefazolin (uncomplicated UTIs only)  Susceptible      Ciprofloxacin  Susceptible      Gentamicin  Susceptible      Nitrofurantoin  Susceptible      Piperacillin/Tazobactam  Susceptible      Trimethoprim/Sulfamethoxazole  Susceptible    ECG 12 Lead    Collection Time: 12/06/23  1:43 PM   Result Value Ref Range    Ventricular Rate 72 BPM    Atrial  Rate 73 BPM    MO Interval 150 ms    QRS Duration 154 ms    QT Interval 395 ms    QTC Calculation(Bazett) 433 ms    P Axis -14 degrees    R Axis 2 degrees    T Axis -34 degrees    QRS Count 12 beats    Q Onset 251 ms    T Offset 449 ms    QTC Fredericia 420 ms   {    Assessment/Plan   Problem List Items Addressed This Visit    None        Assessment, plans, tests, and follow up discussed with patient and patient verbalized understanding. Elysia was given an opportunity to ask questions and  any concerns were addressed including but not limited to ***.

## 2023-12-13 NOTE — PROGRESS NOTES
Patient underwent an echo today.  Preliminary findings show a normal LV systolic function with an EF of 55%  Normal RVSP  Mild MV regurg.  The patient is cleared for surgery with a low risk of cardiovascular complications in the perioperative period.

## 2023-12-14 ENCOUNTER — ANESTHESIA (OUTPATIENT)
Dept: OPERATING ROOM | Facility: HOSPITAL | Age: 63
End: 2023-12-14
Payer: MEDICARE

## 2023-12-14 ENCOUNTER — APPOINTMENT (OUTPATIENT)
Dept: RADIOLOGY | Facility: HOSPITAL | Age: 63
End: 2023-12-14
Payer: MEDICARE

## 2023-12-14 ENCOUNTER — HOSPITAL ENCOUNTER (OUTPATIENT)
Facility: HOSPITAL | Age: 63
Discharge: HOME HEALTH | End: 2023-12-15
Attending: SPECIALIST | Admitting: SPECIALIST
Payer: MEDICARE

## 2023-12-14 DIAGNOSIS — M16.10 ARTHRITIS OF HIP: Primary | ICD-10-CM

## 2023-12-14 DIAGNOSIS — M16.12 ARTHRITIS OF LEFT HIP: ICD-10-CM

## 2023-12-14 DIAGNOSIS — Z01.818 PRE-OP EVALUATION: ICD-10-CM

## 2023-12-14 LAB
ABO GROUP (TYPE) IN BLOOD: NORMAL
ANTIBODY SCREEN: NORMAL
RH FACTOR (ANTIGEN D): NORMAL

## 2023-12-14 PROCEDURE — C1713 ANCHOR/SCREW BN/BN,TIS/BN: HCPCS | Performed by: SPECIALIST

## 2023-12-14 PROCEDURE — 2500000004 HC RX 250 GENERAL PHARMACY W/ HCPCS (ALT 636 FOR OP/ED): Performed by: SPECIALIST

## 2023-12-14 PROCEDURE — 97162 PT EVAL MOD COMPLEX 30 MIN: CPT | Mod: GP

## 2023-12-14 PROCEDURE — 2500000004 HC RX 250 GENERAL PHARMACY W/ HCPCS (ALT 636 FOR OP/ED): Performed by: ANESTHESIOLOGY

## 2023-12-14 PROCEDURE — 3700000002 HC GENERAL ANESTHESIA TIME - EACH INCREMENTAL 1 MINUTE: Performed by: SPECIALIST

## 2023-12-14 PROCEDURE — 96372 THER/PROPH/DIAG INJ SC/IM: CPT | Performed by: NURSE ANESTHETIST, CERTIFIED REGISTERED

## 2023-12-14 PROCEDURE — 7100000011 HC EXTENDED STAY RECOVERY HOURLY - NURSING UNIT

## 2023-12-14 PROCEDURE — 3700000001 HC GENERAL ANESTHESIA TIME - INITIAL BASE CHARGE: Performed by: SPECIALIST

## 2023-12-14 PROCEDURE — 2500000005 HC RX 250 GENERAL PHARMACY W/O HCPCS: Performed by: SPECIALIST

## 2023-12-14 PROCEDURE — 72170 X-RAY EXAM OF PELVIS: CPT | Mod: FY,FR

## 2023-12-14 PROCEDURE — 2500000005 HC RX 250 GENERAL PHARMACY W/O HCPCS: Performed by: NURSE ANESTHETIST, CERTIFIED REGISTERED

## 2023-12-14 PROCEDURE — 7100000002 HC RECOVERY ROOM TIME - EACH INCREMENTAL 1 MINUTE: Performed by: SPECIALIST

## 2023-12-14 PROCEDURE — 2500000001 HC RX 250 WO HCPCS SELF ADMINISTERED DRUGS (ALT 637 FOR MEDICARE OP): Performed by: ANESTHESIOLOGY

## 2023-12-14 PROCEDURE — 2720000007 HC OR 272 NO HCPCS: Performed by: SPECIALIST

## 2023-12-14 PROCEDURE — 97110 THERAPEUTIC EXERCISES: CPT | Mod: GP

## 2023-12-14 PROCEDURE — 7100000001 HC RECOVERY ROOM TIME - INITIAL BASE CHARGE: Performed by: SPECIALIST

## 2023-12-14 PROCEDURE — 36415 COLL VENOUS BLD VENIPUNCTURE: CPT | Performed by: ANESTHESIOLOGY

## 2023-12-14 PROCEDURE — 27130 TOTAL HIP ARTHROPLASTY: CPT | Performed by: SPECIALIST

## 2023-12-14 PROCEDURE — 2780000003 HC OR 278 NO HCPCS: Performed by: SPECIALIST

## 2023-12-14 PROCEDURE — 2500000001 HC RX 250 WO HCPCS SELF ADMINISTERED DRUGS (ALT 637 FOR MEDICARE OP): Performed by: STUDENT IN AN ORGANIZED HEALTH CARE EDUCATION/TRAINING PROGRAM

## 2023-12-14 PROCEDURE — 86901 BLOOD TYPING SEROLOGIC RH(D): CPT | Performed by: ANESTHESIOLOGY

## 2023-12-14 PROCEDURE — 3600000018 HC OR TIME - INITIAL BASE CHARGE - PROCEDURE LEVEL SIX: Performed by: SPECIALIST

## 2023-12-14 PROCEDURE — 72170 X-RAY EXAM OF PELVIS: CPT | Mod: FOREIGN READ | Performed by: RADIOLOGY

## 2023-12-14 PROCEDURE — 2500000004 HC RX 250 GENERAL PHARMACY W/ HCPCS (ALT 636 FOR OP/ED): Performed by: NURSE ANESTHETIST, CERTIFIED REGISTERED

## 2023-12-14 PROCEDURE — 2500000001 HC RX 250 WO HCPCS SELF ADMINISTERED DRUGS (ALT 637 FOR MEDICARE OP): Performed by: SPECIALIST

## 2023-12-14 PROCEDURE — C1776 JOINT DEVICE (IMPLANTABLE): HCPCS | Performed by: SPECIALIST

## 2023-12-14 PROCEDURE — 3600000017 HC OR TIME - EACH INCREMENTAL 1 MINUTE - PROCEDURE LEVEL SIX: Performed by: SPECIALIST

## 2023-12-14 DEVICE — INSERT, TRIDENT X3 POLYETHYLENE, 10 DEG, 36MM E: Type: IMPLANTABLE DEVICE | Site: HIP | Status: FUNCTIONAL

## 2023-12-14 DEVICE — 6.5MM LOW PROFILE HEX SCREW 20MM
Type: IMPLANTABLE DEVICE | Site: HIP | Status: FUNCTIONAL
Brand: TRIDENT II

## 2023-12-14 DEVICE — 132 DEGREE NECK ANGLE HIP STEM
Type: IMPLANTABLE DEVICE | Site: HIP | Status: FUNCTIONAL
Brand: ACCOLADE

## 2023-12-14 DEVICE — TRIDENT II TRITANIUM CLUSTER 54E
Type: IMPLANTABLE DEVICE | Site: HIP | Status: FUNCTIONAL
Brand: TRIDENT II

## 2023-12-14 DEVICE — 6.5MM LOW PROFILE HEX SCREW 25MM
Type: IMPLANTABLE DEVICE | Site: HIP | Status: FUNCTIONAL
Brand: TRIDENT II

## 2023-12-14 DEVICE — CERAMIC V40 FEMORAL HEAD
Type: IMPLANTABLE DEVICE | Site: HIP | Status: FUNCTIONAL
Brand: BIOLOX

## 2023-12-14 RX ORDER — NALOXONE HYDROCHLORIDE 0.4 MG/ML
0.2 INJECTION, SOLUTION INTRAMUSCULAR; INTRAVENOUS; SUBCUTANEOUS EVERY 5 MIN PRN
Status: DISCONTINUED | OUTPATIENT
Start: 2023-12-14 | End: 2023-12-15 | Stop reason: HOSPADM

## 2023-12-14 RX ORDER — ALBUTEROL SULFATE 0.83 MG/ML
2.5 SOLUTION RESPIRATORY (INHALATION) ONCE
Status: DISCONTINUED | OUTPATIENT
Start: 2023-12-14 | End: 2023-12-14 | Stop reason: HOSPADM

## 2023-12-14 RX ORDER — PROPOFOL 10 MG/ML
INJECTION, EMULSION INTRAVENOUS AS NEEDED
Status: DISCONTINUED | OUTPATIENT
Start: 2023-12-14 | End: 2023-12-14

## 2023-12-14 RX ORDER — ROCURONIUM BROMIDE 10 MG/ML
INJECTION, SOLUTION INTRAVENOUS AS NEEDED
Status: DISCONTINUED | OUTPATIENT
Start: 2023-12-14 | End: 2023-12-14

## 2023-12-14 RX ORDER — ACETAMINOPHEN 500 MG
5 TABLET ORAL NIGHTLY
Status: DISCONTINUED | OUTPATIENT
Start: 2023-12-14 | End: 2023-12-15 | Stop reason: HOSPADM

## 2023-12-14 RX ORDER — BUSPIRONE HYDROCHLORIDE 15 MG/1
15 TABLET ORAL 3 TIMES DAILY PRN
Status: DISCONTINUED | OUTPATIENT
Start: 2023-12-14 | End: 2023-12-15 | Stop reason: HOSPADM

## 2023-12-14 RX ORDER — PHENYLEPHRINE HCL IN 0.9% NACL 1 MG/10 ML
SYRINGE (ML) INTRAVENOUS AS NEEDED
Status: DISCONTINUED | OUTPATIENT
Start: 2023-12-14 | End: 2023-12-14

## 2023-12-14 RX ORDER — BISACODYL 5 MG
10 TABLET, DELAYED RELEASE (ENTERIC COATED) ORAL DAILY PRN
Status: DISCONTINUED | OUTPATIENT
Start: 2023-12-14 | End: 2023-12-15 | Stop reason: HOSPADM

## 2023-12-14 RX ORDER — CALCIUM CARBONATE 200(500)MG
500 TABLET,CHEWABLE ORAL 4 TIMES DAILY PRN
Status: DISCONTINUED | OUTPATIENT
Start: 2023-12-14 | End: 2023-12-15 | Stop reason: HOSPADM

## 2023-12-14 RX ORDER — ALUMINUM HYDROXIDE, MAGNESIUM HYDROXIDE, AND SIMETHICONE 1200; 120; 1200 MG/30ML; MG/30ML; MG/30ML
20 SUSPENSION ORAL 4 TIMES DAILY PRN
Status: DISCONTINUED | OUTPATIENT
Start: 2023-12-14 | End: 2023-12-15 | Stop reason: HOSPADM

## 2023-12-14 RX ORDER — ESCITALOPRAM OXALATE 10 MG/1
10 TABLET ORAL DAILY
Status: DISCONTINUED | OUTPATIENT
Start: 2023-12-14 | End: 2023-12-15 | Stop reason: HOSPADM

## 2023-12-14 RX ORDER — MORPHINE SULFATE 2 MG/ML
2 INJECTION, SOLUTION INTRAMUSCULAR; INTRAVENOUS EVERY 5 MIN PRN
Status: DISCONTINUED | OUTPATIENT
Start: 2023-12-14 | End: 2023-12-14 | Stop reason: HOSPADM

## 2023-12-14 RX ORDER — FENTANYL CITRATE 50 UG/ML
INJECTION, SOLUTION INTRAMUSCULAR; INTRAVENOUS AS NEEDED
Status: DISCONTINUED | OUTPATIENT
Start: 2023-12-14 | End: 2023-12-14

## 2023-12-14 RX ORDER — LIDOCAINE HYDROCHLORIDE 20 MG/ML
INJECTION, SOLUTION INFILTRATION; PERINEURAL AS NEEDED
Status: DISCONTINUED | OUTPATIENT
Start: 2023-12-14 | End: 2023-12-14

## 2023-12-14 RX ORDER — BUPROPION HYDROCHLORIDE 150 MG/1
150 TABLET ORAL EVERY MORNING
Status: DISCONTINUED | OUTPATIENT
Start: 2023-12-14 | End: 2023-12-15 | Stop reason: HOSPADM

## 2023-12-14 RX ORDER — SODIUM CITRATE AND CITRIC ACID MONOHYDRATE 334; 500 MG/5ML; MG/5ML
30 SOLUTION ORAL ONCE
Status: COMPLETED | OUTPATIENT
Start: 2023-12-14 | End: 2023-12-14

## 2023-12-14 RX ORDER — DEXAMETHASONE SODIUM PHOSPHATE 4 MG/ML
INJECTION, SOLUTION INTRA-ARTICULAR; INTRALESIONAL; INTRAMUSCULAR; INTRAVENOUS; SOFT TISSUE AS NEEDED
Status: DISCONTINUED | OUTPATIENT
Start: 2023-12-14 | End: 2023-12-14

## 2023-12-14 RX ORDER — FAMOTIDINE 10 MG/ML
20 INJECTION INTRAVENOUS ONCE
Status: COMPLETED | OUTPATIENT
Start: 2023-12-14 | End: 2023-12-14

## 2023-12-14 RX ORDER — METOCLOPRAMIDE HYDROCHLORIDE 5 MG/ML
10 INJECTION INTRAMUSCULAR; INTRAVENOUS ONCE
Status: COMPLETED | OUTPATIENT
Start: 2023-12-14 | End: 2023-12-14

## 2023-12-14 RX ORDER — ROPIVACAINE/EPI/CLONIDINE/KET 2.46-0.005
SYRINGE (ML) INJECTION AS NEEDED
Status: DISCONTINUED | OUTPATIENT
Start: 2023-12-14 | End: 2023-12-14 | Stop reason: HOSPADM

## 2023-12-14 RX ORDER — ASPIRIN 325 MG
325 TABLET, DELAYED RELEASE (ENTERIC COATED) ORAL 2 TIMES DAILY
Status: DISCONTINUED | OUTPATIENT
Start: 2023-12-14 | End: 2023-12-15 | Stop reason: HOSPADM

## 2023-12-14 RX ORDER — ROPIVACAINE HYDROCHLORIDE 5 MG/ML
INJECTION, SOLUTION EPIDURAL; INFILTRATION; PERINEURAL AS NEEDED
Status: DISCONTINUED | OUTPATIENT
Start: 2023-12-14 | End: 2023-12-14

## 2023-12-14 RX ORDER — HYDROCODONE BITARTRATE AND ACETAMINOPHEN 5; 325 MG/1; MG/1
2 TABLET ORAL EVERY 4 HOURS PRN
Status: DISCONTINUED | OUTPATIENT
Start: 2023-12-14 | End: 2023-12-15 | Stop reason: HOSPADM

## 2023-12-14 RX ORDER — MIDAZOLAM HYDROCHLORIDE 1 MG/ML
INJECTION, SOLUTION INTRAMUSCULAR; INTRAVENOUS AS NEEDED
Status: DISCONTINUED | OUTPATIENT
Start: 2023-12-14 | End: 2023-12-14

## 2023-12-14 RX ORDER — SODIUM CHLORIDE 9 MG/ML
100 INJECTION, SOLUTION INTRAVENOUS CONTINUOUS
Status: DISCONTINUED | OUTPATIENT
Start: 2023-12-14 | End: 2023-12-15 | Stop reason: HOSPADM

## 2023-12-14 RX ORDER — POLYETHYLENE GLYCOL 3350 17 G/17G
17 POWDER, FOR SOLUTION ORAL DAILY
Status: DISCONTINUED | OUTPATIENT
Start: 2023-12-14 | End: 2023-12-15 | Stop reason: HOSPADM

## 2023-12-14 RX ORDER — VANCOMYCIN HYDROCHLORIDE 1 G/200ML
1 INJECTION, SOLUTION INTRAVENOUS ONCE
Status: COMPLETED | OUTPATIENT
Start: 2023-12-14 | End: 2023-12-14

## 2023-12-14 RX ORDER — BUTYROSPERMUM PARKII(SHEA BUTTER), SIMMONDSIA CHINENSIS (JOJOBA) SEED OIL, ALOE BARBADENSIS LEAF EXTRACT .01; 1; 3.5 G/100G; G/100G; G/100G
250 LIQUID TOPICAL 2 TIMES DAILY
Status: DISCONTINUED | OUTPATIENT
Start: 2023-12-14 | End: 2023-12-15 | Stop reason: HOSPADM

## 2023-12-14 RX ORDER — TRANEXAMIC ACID 100 MG/ML
INJECTION, SOLUTION INTRAVENOUS AS NEEDED
Status: DISCONTINUED | OUTPATIENT
Start: 2023-12-14 | End: 2023-12-14

## 2023-12-14 RX ORDER — ONDANSETRON HYDROCHLORIDE 2 MG/ML
4 INJECTION, SOLUTION INTRAVENOUS ONCE
Status: COMPLETED | OUTPATIENT
Start: 2023-12-14 | End: 2023-12-14

## 2023-12-14 RX ORDER — ONDANSETRON HYDROCHLORIDE 2 MG/ML
4 INJECTION, SOLUTION INTRAVENOUS EVERY 8 HOURS PRN
Status: DISCONTINUED | OUTPATIENT
Start: 2023-12-14 | End: 2023-12-15 | Stop reason: HOSPADM

## 2023-12-14 RX ORDER — KETOROLAC TROMETHAMINE 30 MG/ML
INJECTION, SOLUTION INTRAMUSCULAR; INTRAVENOUS AS NEEDED
Status: DISCONTINUED | OUTPATIENT
Start: 2023-12-14 | End: 2023-12-14

## 2023-12-14 RX ORDER — MORPHINE SULFATE 4 MG/ML
4 INJECTION INTRAVENOUS EVERY 5 MIN PRN
Status: DISCONTINUED | OUTPATIENT
Start: 2023-12-14 | End: 2023-12-14 | Stop reason: HOSPADM

## 2023-12-14 RX ORDER — HYDROCODONE BITARTRATE AND ACETAMINOPHEN 5; 325 MG/1; MG/1
1 TABLET ORAL EVERY 4 HOURS PRN
Status: DISCONTINUED | OUTPATIENT
Start: 2023-12-14 | End: 2023-12-15 | Stop reason: HOSPADM

## 2023-12-14 RX ORDER — SODIUM CHLORIDE, SODIUM LACTATE, POTASSIUM CHLORIDE, CALCIUM CHLORIDE 600; 310; 30; 20 MG/100ML; MG/100ML; MG/100ML; MG/100ML
50 INJECTION, SOLUTION INTRAVENOUS CONTINUOUS
Status: DISCONTINUED | OUTPATIENT
Start: 2023-12-14 | End: 2023-12-15

## 2023-12-14 RX ORDER — ONDANSETRON HYDROCHLORIDE 2 MG/ML
4 INJECTION, SOLUTION INTRAVENOUS ONCE AS NEEDED
Status: DISCONTINUED | OUTPATIENT
Start: 2023-12-14 | End: 2023-12-14 | Stop reason: HOSPADM

## 2023-12-14 RX ORDER — ONDANSETRON 4 MG/1
4 TABLET, FILM COATED ORAL EVERY 8 HOURS PRN
Status: DISCONTINUED | OUTPATIENT
Start: 2023-12-14 | End: 2023-12-15 | Stop reason: HOSPADM

## 2023-12-14 RX ORDER — LORATADINE 10 MG/1
10 TABLET ORAL DAILY
Status: DISCONTINUED | OUTPATIENT
Start: 2023-12-14 | End: 2023-12-15 | Stop reason: HOSPADM

## 2023-12-14 RX ORDER — BISACODYL 10 MG/1
10 SUPPOSITORY RECTAL DAILY PRN
Status: DISCONTINUED | OUTPATIENT
Start: 2023-12-14 | End: 2023-12-15 | Stop reason: HOSPADM

## 2023-12-14 RX ADMIN — KETOROLAC TROMETHAMINE 30 MG: 30 INJECTION, SOLUTION INTRAMUSCULAR at 09:08

## 2023-12-14 RX ADMIN — FENTANYL CITRATE 100 MCG: 50 INJECTION, SOLUTION INTRAMUSCULAR; INTRAVENOUS at 07:18

## 2023-12-14 RX ADMIN — TRANEXAMIC ACID 1000 MG: 100 INJECTION, SOLUTION INTRAVENOUS at 07:43

## 2023-12-14 RX ADMIN — DEXAMETHASONE SODIUM PHOSPHATE 8 MG: 4 INJECTION INTRA-ARTICULAR; INTRALESIONAL; INTRAMUSCULAR; INTRAVENOUS; SOFT TISSUE at 07:43

## 2023-12-14 RX ADMIN — ASPIRIN 325 MG: 325 TABLET, COATED ORAL at 13:57

## 2023-12-14 RX ADMIN — FENTANYL CITRATE 100 MCG: 50 INJECTION, SOLUTION INTRAMUSCULAR; INTRAVENOUS at 08:49

## 2023-12-14 RX ADMIN — LIDOCAINE HYDROCHLORIDE 60 MG: 20 INJECTION, SOLUTION INFILTRATION; PERINEURAL at 07:43

## 2023-12-14 RX ADMIN — MIDAZOLAM HYDROCHLORIDE 2 MG: 1 INJECTION, SOLUTION INTRAMUSCULAR; INTRAVENOUS at 07:17

## 2023-12-14 RX ADMIN — ONDANSETRON 4 MG: 2 INJECTION INTRAMUSCULAR; INTRAVENOUS at 06:38

## 2023-12-14 RX ADMIN — FAMOTIDINE 20 MG: 10 INJECTION, SOLUTION INTRAVENOUS at 06:37

## 2023-12-14 RX ADMIN — SODIUM CHLORIDE 100 ML/HR: 9 INJECTION, SOLUTION INTRAVENOUS at 13:59

## 2023-12-14 RX ADMIN — PROPOFOL 200 MG: 10 INJECTION, EMULSION INTRAVENOUS at 07:43

## 2023-12-14 RX ADMIN — METOCLOPRAMIDE 10 MG: 5 INJECTION, SOLUTION INTRAMUSCULAR; INTRAVENOUS at 06:37

## 2023-12-14 RX ADMIN — TRANEXAMIC ACID 1000 MG: 100 INJECTION, SOLUTION INTRAVENOUS at 08:50

## 2023-12-14 RX ADMIN — ESCITALOPRAM OXALATE 10 MG: 10 TABLET ORAL at 13:57

## 2023-12-14 RX ADMIN — VANCOMYCIN HYDROCHLORIDE 1500 MG: 1.5 INJECTION, POWDER, LYOPHILIZED, FOR SOLUTION INTRAVENOUS at 07:02

## 2023-12-14 RX ADMIN — SODIUM CITRATE AND CITRIC ACID MONOHYDRATE 30 ML: 500; 334 SOLUTION ORAL at 06:38

## 2023-12-14 RX ADMIN — SODIUM CHLORIDE, SODIUM LACTATE, POTASSIUM CHLORIDE, AND CALCIUM CHLORIDE: 600; 310; 30; 20 INJECTION, SOLUTION INTRAVENOUS at 07:40

## 2023-12-14 RX ADMIN — SODIUM CHLORIDE, POTASSIUM CHLORIDE, SODIUM LACTATE AND CALCIUM CHLORIDE 50 ML/HR: 600; 310; 30; 20 INJECTION, SOLUTION INTRAVENOUS at 06:38

## 2023-12-14 RX ADMIN — Medication 100 MCG: at 07:45

## 2023-12-14 RX ADMIN — Medication 5 MG: at 20:14

## 2023-12-14 RX ADMIN — VANCOMYCIN HYDROCHLORIDE 1 G: 1 INJECTION, SOLUTION INTRAVENOUS at 20:14

## 2023-12-14 RX ADMIN — LORATADINE 10 MG: 10 TABLET ORAL at 13:57

## 2023-12-14 RX ADMIN — SUGAMMADEX 200 MG: 100 INJECTION, SOLUTION INTRAVENOUS at 09:11

## 2023-12-14 RX ADMIN — ALUMINUM HYDROXIDE, MAGNESIUM HYDROXIDE, AND DIMETHICONE 20 ML: 200; 20; 200 SUSPENSION ORAL at 23:33

## 2023-12-14 RX ADMIN — Medication 100 MCG: at 09:07

## 2023-12-14 RX ADMIN — ASPIRIN 325 MG: 325 TABLET, COATED ORAL at 20:14

## 2023-12-14 RX ADMIN — HYDROCODONE BITARTRATE AND ACETAMINOPHEN 2 TABLET: 5; 325 TABLET ORAL at 23:33

## 2023-12-14 RX ADMIN — ROCURONIUM BROMIDE 50 MG: 10 INJECTION, SOLUTION INTRAVENOUS at 07:43

## 2023-12-14 SDOH — SOCIAL STABILITY: SOCIAL INSECURITY: ARE YOU OR HAVE YOU BEEN THREATENED OR ABUSED PHYSICALLY, EMOTIONALLY, OR SEXUALLY BY ANYONE?: NO

## 2023-12-14 SDOH — HEALTH STABILITY: MENTAL HEALTH: CURRENT SMOKER: 0

## 2023-12-14 SDOH — SOCIAL STABILITY: SOCIAL INSECURITY: ARE THERE ANY APPARENT SIGNS OF INJURIES/BEHAVIORS THAT COULD BE RELATED TO ABUSE/NEGLECT?: NO

## 2023-12-14 SDOH — SOCIAL STABILITY: SOCIAL INSECURITY: ABUSE: ADULT

## 2023-12-14 SDOH — SOCIAL STABILITY: SOCIAL INSECURITY: DO YOU FEEL UNSAFE GOING BACK TO THE PLACE WHERE YOU ARE LIVING?: NO

## 2023-12-14 SDOH — SOCIAL STABILITY: SOCIAL INSECURITY: HAS ANYONE EVER THREATENED TO HURT YOUR FAMILY OR YOUR PETS?: NO

## 2023-12-14 SDOH — SOCIAL STABILITY: SOCIAL INSECURITY: DOES ANYONE TRY TO KEEP YOU FROM HAVING/CONTACTING OTHER FRIENDS OR DOING THINGS OUTSIDE YOUR HOME?: NO

## 2023-12-14 SDOH — SOCIAL STABILITY: SOCIAL INSECURITY: HAVE YOU HAD THOUGHTS OF HARMING ANYONE ELSE?: NO

## 2023-12-14 SDOH — SOCIAL STABILITY: SOCIAL INSECURITY: DO YOU FEEL ANYONE HAS EXPLOITED OR TAKEN ADVANTAGE OF YOU FINANCIALLY OR OF YOUR PERSONAL PROPERTY?: NO

## 2023-12-14 SDOH — SOCIAL STABILITY: SOCIAL INSECURITY: WERE YOU ABLE TO COMPLETE ALL THE BEHAVIORAL HEALTH SCREENINGS?: YES

## 2023-12-14 ASSESSMENT — PAIN SCALES - GENERAL
PAINLEVEL_OUTOF10: 0 - NO PAIN
PAIN_LEVEL: 0
PAINLEVEL_OUTOF10: 8
PAINLEVEL_OUTOF10: 0 - NO PAIN
PAINLEVEL_OUTOF10: 7
PAINLEVEL_OUTOF10: 0 - NO PAIN

## 2023-12-14 ASSESSMENT — ACTIVITIES OF DAILY LIVING (ADL)
HEARING - RIGHT EAR: FUNCTIONAL
LACK_OF_TRANSPORTATION: PATIENT DECLINED
GROOMING: INDEPENDENT
BATHING: INDEPENDENT
FEEDING YOURSELF: INDEPENDENT
JUDGMENT_ADEQUATE_SAFELY_COMPLETE_DAILY_ACTIVITIES: YES
WALKS IN HOME: NEEDS ASSISTANCE
TOILETING: INDEPENDENT
ADEQUATE_TO_COMPLETE_ADL: YES
ASSISTIVE_DEVICE: WALKER;CANE
PATIENT'S MEMORY ADEQUATE TO SAFELY COMPLETE DAILY ACTIVITIES?: YES
HEARING - LEFT EAR: FUNCTIONAL
DRESSING YOURSELF: INDEPENDENT

## 2023-12-14 ASSESSMENT — COGNITIVE AND FUNCTIONAL STATUS - GENERAL
WALKING IN HOSPITAL ROOM: A LOT
MOBILITY SCORE: 14
TURNING FROM BACK TO SIDE WHILE IN FLAT BAD: A LITTLE
TURNING FROM BACK TO SIDE WHILE IN FLAT BAD: A LITTLE
TOILETING: A LOT
DRESSING REGULAR LOWER BODY CLOTHING: A LITTLE
WALKING IN HOSPITAL ROOM: A LOT
MOBILITY SCORE: 14
STANDING UP FROM CHAIR USING ARMS: A LOT
CLIMB 3 TO 5 STEPS WITH RAILING: TOTAL
WALKING IN HOSPITAL ROOM: A LOT
DAILY ACTIVITIY SCORE: 20
MOVING TO AND FROM BED TO CHAIR: A LOT
CLIMB 3 TO 5 STEPS WITH RAILING: TOTAL
TURNING FROM BACK TO SIDE WHILE IN FLAT BAD: A LOT
CLIMB 3 TO 5 STEPS WITH RAILING: TOTAL
DAILY ACTIVITIY SCORE: 20
HELP NEEDED FOR BATHING: A LITTLE
MOBILITY SCORE: 11
STANDING UP FROM CHAIR USING ARMS: A LOT
STANDING UP FROM CHAIR USING ARMS: A LOT
MOVING TO AND FROM BED TO CHAIR: A LOT
MOVING FROM LYING ON BACK TO SITTING ON SIDE OF FLAT BED WITH BEDRAILS: A LOT
HELP NEEDED FOR BATHING: A LITTLE
MOVING TO AND FROM BED TO CHAIR: A LOT
DRESSING REGULAR LOWER BODY CLOTHING: A LITTLE
TOILETING: A LOT
PATIENT BASELINE BEDBOUND: NO

## 2023-12-14 ASSESSMENT — COLUMBIA-SUICIDE SEVERITY RATING SCALE - C-SSRS
6. HAVE YOU EVER DONE ANYTHING, STARTED TO DO ANYTHING, OR PREPARED TO DO ANYTHING TO END YOUR LIFE?: NO
2. HAVE YOU ACTUALLY HAD ANY THOUGHTS OF KILLING YOURSELF?: NO
1. IN THE PAST MONTH, HAVE YOU WISHED YOU WERE DEAD OR WISHED YOU COULD GO TO SLEEP AND NOT WAKE UP?: NO

## 2023-12-14 ASSESSMENT — PAIN - FUNCTIONAL ASSESSMENT
PAIN_FUNCTIONAL_ASSESSMENT: 0-10

## 2023-12-14 ASSESSMENT — PATIENT HEALTH QUESTIONNAIRE - PHQ9
SUM OF ALL RESPONSES TO PHQ9 QUESTIONS 1 & 2: 0
1. LITTLE INTEREST OR PLEASURE IN DOING THINGS: NOT AT ALL
2. FEELING DOWN, DEPRESSED OR HOPELESS: NOT AT ALL

## 2023-12-14 ASSESSMENT — LIFESTYLE VARIABLES
PRESCIPTION_ABUSE_PAST_12_MONTHS: NO
AUDIT-C TOTAL SCORE: 0
HOW OFTEN DO YOU HAVE A DRINK CONTAINING ALCOHOL: NEVER
HOW OFTEN DO YOU HAVE 6 OR MORE DRINKS ON ONE OCCASION: NEVER
AUDIT-C TOTAL SCORE: 0
HOW MANY STANDARD DRINKS CONTAINING ALCOHOL DO YOU HAVE ON A TYPICAL DAY: PATIENT DOES NOT DRINK
SKIP TO QUESTIONS 9-10: 1
SUBSTANCE_ABUSE_PAST_12_MONTHS: NO

## 2023-12-14 ASSESSMENT — PAIN DESCRIPTION - LOCATION: LOCATION: BACK

## 2023-12-14 NOTE — ANESTHESIA PROCEDURE NOTES
Airway  Date/Time: 12/14/2023 7:46 AM  Urgency: elective    Airway not difficult    Staffing  Performed: CRNA   Authorized by: JEN Ureña    Performed by: JEN Ureña  Patient location during procedure: OR    Indications and Patient Condition  Indications for airway management: anesthesia and airway protection  Spontaneous ventilation: present  Sedation level: deep  Preoxygenated: yes  Patient position: sniffing  Mask difficulty assessment: 1 - vent by mask    Final Airway Details  Final airway type: endotracheal airway      Successful airway: ETT  Cuffed: yes   Successful intubation technique: direct laryngoscopy  Facilitating devices/methods: intubating stylet  Endotracheal tube insertion site: oral  Blade: Axel  Blade size: #4  Cormack-Lehane Classification: grade IIa - partial view of glottis  Cuff volume (mL): 7  Measured from: lips  Number of attempts at approach: 1

## 2023-12-14 NOTE — CARE PLAN
Problem: Mobility  Goal: STG - Patient will recall and demonstrate 3 out of 3 total hip precautions during mobility  Outcome: Not Progressing  Goal: STG - Patient will navigate 4-6 steps with rails/device  Description: MIN A WBAT LLE  Outcome: Not Progressing  Goal: STG - Patient will ambulate  Description: 100 FT FWW, CGA USING PROPER GAIT PATTERN  Outcome: Not Progressing  Goal: STRENGTHENING  Description: 20+ REPS AROM LLE AND RROM RLE IMPROVING GAIT STABITLIY  Outcome: Not Progressing     Problem: Transfers  Goal: STG - Patient to transfer to and from sit to supine  Description: MIN HOB FLAT NO RAILS  Outcome: Not Progressing  Goal: STG - Patient will transfer sit to and from stand  Description: FWW WBAT LLE, USING PROPER TECHNIQUE FOR CANDI LLE  Outcome: Not Progressing

## 2023-12-14 NOTE — OP NOTE
LEFT TOTAL HIP ARTHROPLASTY (23 HOUR OBS) GENERAL/BLOCK - FAUSTO ACCOLADE II ) (L) Operative Note     Date: 2023  OR Location: [facility]    Name: Elysia Zuniga, : 1960, Age: 63 y.o., MRN: [id number], Sex: female    Diagnosis  Pre-op Diagnosis     * Arthritis of hip [M16.10] Post-op Diagnosis     * Arthritis of hip [M16.10]     Procedures  LEFT TOTAL HIP ARTHROPLASTY (23 HOUR OBS) GENERAL/BLOCK - FAUSTO ACCOLADE II )  96508 - CO ARTHRP ACETBLR/PROX FEM PROSTC AGRFT/ALGRFT    CO ARTHRP ACETBLR/PROX FEM PROSTC AGRFT/ALGRFT [82927]  Surgeons      * Natanael De Jesus - Primary    Resident/Fellow/Other Assistant:  Surgeon(s) and Role:    Procedure Summary  Anesthesia: General  ASA: III  Anesthesia Staff: CRNA: KYA Ureña-CRNA  Estimated Blood Loss: 150 mL  Intra-op Medications:   Medication Name Total Dose   ropivacaine-epinephrine-clonidine-ketorolac 2.46-0.005- 0.0008-0.3mg/mL periarticular syringe 50 mL              Anesthesia Record               Intraprocedure I/O Totals       None           Specimen: No specimens collected     Staff:   Circulator: Chantelle Simms RN  Scrub Person: Chris Santos RN         Drains and/or Catheters: * None in log *    Tourniquet Times:         Implants:  Implants       Type Name Action Serial No.      Joint SHELL, TRIDENT II, CLUSTERHOLE, 54E - [id number] Implanted      Joint INSERT, TRIDENT X3 POLYETHYLENE, 10 DEG, 36MM E - [id number] Implanted      Screw SCREW, LOW PROFILE HEX, 6.5 X 20 MM - [id number] Implanted      Screw SCREW, LOW PROFILE HEX, 6.5 X 25 MM - [id number] Implanted      Joint HEAD, FEMUR V40 36MM -2.5MM BIOLOX DELTA - [id number] Implanted      Joint STEM, FEMUR 132D SZ 6 ACCOLADE II - [id number] Implanted               Findings: Severe osteoarthritis    Indications: Elysia Zuniga is an 63 y.o. female who is having surgery for Arthritis of hip [M16.10].  Elected to proceed with a left total hip arthroplasty    The patient was seen in the preoperative  "area. The risks, benefits, complications, treatment options, non-operative alternatives, expected recovery and outcomes were discussed with the patient. The possibilities of reaction to medication, pulmonary aspiration, injury to surrounding structures, bleeding, recurrent infection, the need for additional procedures, failure to diagnose a condition, and creating a complication requiring transfusion or operation were discussed with the patient. The patient concurred with the proposed plan, giving informed consent.  The site of surgery was properly noted/marked if necessary per policy. The patient has been actively warmed in preoperative area. Preoperative antibiotics have been ordered and given within 1 hours of incision. Venous thrombosis prophylaxis have been ordered including unilateral sequential compression device    Procedure Details:Procedure Details: Patient brought to the OR and placed supine on the OR table.  Patient received a preoperative block and also appropriate IV antibiotics, and 1 g of TXA before the start of the case.  After successful general anesthetic he was placed in the lateral decubitus position with the operative side up taking care to protect the airway and all bony prominences and peripheral nerves.  We then began by doing a routine sterile prep and drape of the operative lower extremity.  During the procedure and the subcutaneous plane fascial plane we did inject a total of 50 cc of a \"cocktail\" of ropivacaine, epinephrine, Toradol, and Klonopin.  We made our incision with a 10 blade approximately 15 cm longitudinal incision over the posterior lateral proximal femur in the usual location for a posterior approach.  After sharp dissection through the dermis blunt dissection brought us down to the subcutaneous plane where we used a key elevator to remove subcutaneous fat from the fascia darci.  Over the proximal femur greater trochanteric region we made a small incision in the fascia darci " and with leg leg abduction and a Jo scissor we extended this fascia darci incision distally and proximally to expose the hip region.  With internal rotation of the hip we exposed the short external rotators and posterior capsule.  We carefully resected the trochanteric bursal tissue to expose these rotators.  Then using 10 blade and electrocautery we made a full-thickness cut and the posterior capsule and external rotators teeing superiorly to expose the posterior hip joint by reflecting the capsule and rotators posteriorly to protect sciatic nerve.  We placed two #1 Vicryl stay sutures into the capsular rotator cuff interval and tagged them for later suturing.  Should be noted during this case we did place a Charnley bow retractor in the fascia darci layer to help expose exposure and also used Hohmann retractors to expose proximally the gluteal muscles and a blunt Hohmann around the neck and calcar of the femur for exposure.  With the capsule open we then dislocated the hip and internally rotated to have it enter the posterior wound.  We used the neck cutting guide to measure a neck cut appropriate angle 1.5 cm proximal to the lesser trochanter and used a sagittal saw to cut the neck and remove the femoral head the head was then measured for the purposes of reaming the acetabulum.  We then placed a Hohmann retractor in the anterior wall of the acetabulum and reflected the proximal femur anteriorly to expose the acetabulum.  We removed degenerative spurs and labral tissue from the rim of the acetabulum and also remove the foveal tissue along with the stump of the ligamentum teres, electrocauterize and artery of the ligamentum teres.  We then did progressive reaming of the acetabulum first with a smaller reamer to medialize to the floor the pelvis.  And then progressive reamings were performed up to the same size acetabular shell we ended up using.  We placed the titanium uncemented acetabulum shell into the reamed  acetabulum securing it in 45 degrees of flexion and 15 to 20 degrees of anteversion.  With the cup placed correctly and secured it was then stabilized with 1 acetabular shell screw in the superior posterior region of the pelvis in usual manner.  We then placed a 10 degree hooded polyethylene into the acetabular shell and then cover the acetabulum for completing the femoral stem size.  Then the femoral proximal portion was rotated into the wound with 90 degrees internal rotation.  With appropriate exposure we then used a  to remove the lateral femoral neck.  We used a canal finder to find the correct proximal femoral canal and then did progressive broaching up to the appropriate size broach for the femoral stem.  This broach was left in place for trial reduction with the trial components.  We then with the trial components check leg lengths and appropriate stable full motion.  We then removed the trial component placed the final femoral stem into to 15 degrees anteversion and then tapped on the final appropriate sized length neck/ceramic head combo onto the trunnion of the titanium femoral stem.  Our final reduction was performed in usual manner once again checked full range and stable motion.  We then used pulsatile lavage to clean the region of surgery.  Controlled bleeding throughout with electrocautery and TXA a second gram dose at the end of the case.  We also soaked the components in saline and Betadine for a minute and then her final pulsatile lavage.  We then closed in layers the short external rotary Tatar's and probe posterior capsule were closed over drill holes through the posterior greater trochanteric region.  Fascia darci was closed with running locking #1 Vicryl suture.  The subdermal camper's fascia was closed with a running 0 Vicryl suture skin was closed with buried interrupted 2-0 Vicryl suture and surgical staples.  At the end the case we cleansed the skin and placed a sterile dressing.   The patient was then awoken in stable condition transferred to recovery.   Complications:  None; patient tolerated the procedure well.    Disposition: PACU - hemodynamically stable.  Condition: stable         Additional Details: Tirso Accolade 2 uncemented total hip 54 mm TriTanium shell, 10 degree poly 36 mm insert, number six 132 degree neck femoral stem, 36 mm -2.5 neck ceramic head.    Attending Attestation: I was present and scrubbed for the entire procedure.    Natanael De Jesus  Phone Number: [phone]

## 2023-12-14 NOTE — ANESTHESIA PREPROCEDURE EVALUATION
Patient: Elysia Zuniga    Procedure Information       Date/Time: 12/14/23 0730    Procedure: LEFT TOTAL HIP ARTHROPLASTY (23 HOUR OBS) GENERAL/BLOCK - FAUSTO ACCOLADE II ) (Left: Hip) - GENERAL/BLOCK (23 HOURS OBS - FAUSTO ACCOLADE II )    Location: [facility] 07 / [facility]    Surgeons: Natanael De Jesus MD            Relevant Problems   Cardiovascular   (+) Mixed hyperlipidemia   (+) RBBB      /Renal   (+) UTI (urinary tract infection)      Neuro/Psych   (+) Major depressive disorder, single episode, moderate degree (CMS/HCC)      Musculoskeletal   (+) Spinal stenosis of lumbar region without neurogenic claudication      Infectious Disease   (+) UTI (urinary tract infection)      Other   (+) Arthritis of hip   (+) Arthritis of left hip       Clinical information reviewed:   Tobacco  Allergies  Meds  Problems  Med Hx  Surg Hx   Fam Hx  Soc   Hx        NPO Detail:  NPO/Void Status  Date of Last Liquid: 12/13/23  Time of Last Liquid: 2200  Date of Last Solid: 12/13/23  Time of Last Solid: 2100  Last Intake Type: Clear fluids         Physical Exam    Airway  Mallampati: III  TM distance: >3 FB  Neck ROM: full     Cardiovascular - normal exam     Dental    Pulmonary - normal exam     Abdominal            Anesthesia Plan    ASA 3     general and regional   (GA with PENG block discussed)  The patient is not a current smoker.    intravenous induction   Anesthetic plan and risks discussed with patient.  Use of blood products discussed with patient who consented to blood products.

## 2023-12-14 NOTE — ANESTHESIA POSTPROCEDURE EVALUATION
Patient: Elysia Zuniga    Procedure Summary       Date: 12/14/23 Room / Location: [facility] / [facility]    Anesthesia Start: 0740 Anesthesia Stop: 0943    Procedure: LEFT TOTAL HIP ARTHROPLASTY (23 HOUR OBS) GENERAL/BLOCK - FAUSTO ACCOLADE II ) (Left: Hip) Diagnosis:       Arthritis of hip      (Arthritis of hip [M16.10])    Surgeons: Natanael De Jesus MD Responsible Provider: JEN Ureña    Anesthesia Type: general, regional ASA Status: 3            Anesthesia Type: general, regional    Vitals Value Taken Time   /70 12/14/23 1010   Temp 97.7 12/14/23 1049   Pulse 68 12/14/23 1010   Resp 14 12/14/23 1010   SpO2 99 % 12/14/23 0950       Anesthesia Post Evaluation    Patient location during evaluation: PACU  Patient participation: complete - patient participated  Level of consciousness: awake  Pain score: 0  Pain management: adequate  Multimodal analgesia pain management approach  Airway patency: patent  Cardiovascular status: acceptable  Respiratory status: CPAP  Hydration status: acceptable  Postoperative Nausea and Vomiting: none      No notable events documented.

## 2023-12-14 NOTE — ANESTHESIA PROCEDURE NOTES
Peripheral Block    Patient location during procedure: OR  Start time: 12/14/2023 7:17 AM  End time: 12/14/2023 7:46 AM  Reason for block: procedure for pain, at surgeon's request and post-op pain management  Staffing  Performed: CRNA   Authorized by: JEN Ureña    Performed by: JEN Guadalupe  Preanesthetic Checklist  Completed: patient identified, IV checked, site marked, risks and benefits discussed, surgical consent, monitors and equipment checked, pre-op evaluation and timeout performed   Timeout performed at: 12/14/2023 7:46 AM  Peripheral Block  Patient position: laying flat  Prep: ChloraPrep  Patient monitoring: heart rate, cardiac monitor and continuous pulse ox  Block type: other  Laterality: left  Injection technique: single-shot  Guidance: ultrasound guided  Local infiltration: lidocaine  Infiltration strength: 2 %  Dose: 2 mL  Needle  Needle type: Tuohy   Needle gauge: 20 G  Needle length: 15 cm  Needle localization: anatomical landmarks  Needle insertion depth: 12 cm  Test dose: negative  Assessment  Injection assessment: negative aspiration for heme and no paresthesia on injection  Paresthesia pain: none  Heart rate change: no  Slow fractionated injection: yes  Additional Notes  Prepped times 3 with Chlorhexadene.  Local to injection site. Poor visualization of needle. Psoas tendon elevatedwith 25 ml of 2% Ropivicaine and 10 Decadron

## 2023-12-14 NOTE — PROGRESS NOTES
Physical Therapy    Physical Therapy Evaluation    Patient Name: Elysia Zuniga  MRN: [id number]  Today's Date: 12/14/2023   Time Calculation  Start Time: 1440  Stop Time: 1523  Time Calculation (min): 43 min    Assessment/Plan   PT Assessment  PT Assessment Results: Decreased strength, Decreased range of motion, Decreased endurance, Impaired balance, Decreased mobility, Decreased coordination, Decreased safety awareness, Pain, Orthopedic restrictions  Rehab Prognosis: Fair  Strengths: Support and attitude of living partners, Support of extended family/friends  End of Session Communication: Bedside nurse  Assessment Comment:  (POST L CANDI TODAY, NEEDS 2 A FOR MOBITY, FATIGUED QUICKLY, MOTIVATED, WILLNEED REINFORCEMENT OF PRECAUTIONS, AND SKILLED REHAB ON DISCH)  End of Session Patient Position: Bed, 3 rail up, Alarm on (CALLLIGHT IN REACH, KNEES OF BED LOCKED IN EXTENSION, ABD PILLOWIN PLACE)  IP OR SWING BED PT PLAN  Inpatient or Swing Bed: Inpatient  PT Plan  Treatment/Interventions: Bed mobility, Transfer training, Gait training, Stair training, Strengthening (REINFORCE PRECAUTIONS/SAFETY POST CANDI)  PT Plan: Skilled PT  PT Frequency: 5 times per week (BID)  PT Discharge Recommendations: Low intensity level of continued care  Equipment Recommended upon Discharge:  (SHOWER SEAT)  PT Recommended Transfer Status: Assist x2 (FWW WBAT LLE, THAPOSTERIOR PRECAUTIONS)  PT - OK to Discharge: Yes (TO NEXT LOC WHENM EDICALLY CLEARED)      Subjective   General Visit Information:  General  Reason for Referral: RECENT SURG, CANDI, WBAT, POSTERIOR HIP PRECAUTIONS  Referred By: REGLA  Past Medical History Relevant to Rehab: OA L HIP; DX: L CANDI; HX: R BBB, DEPRESSION, OA, CHRONIC BACKPAIN, HERNIATED DISCS  Prior to Session Communication: Bedside nurse  Patient Position Received: Bed, 3 rail up, Alarm on (HIP ABD PILLOW IN PLACE, ROOM 3334,OK PER RN TO SEE FOR MOBITY, PT. AGREEABLE)  General Comment: DRESSING ON L HIP SURG SITE  Home  "Living:  Home Living  Home Living Comments:  (SHARES HOME W/ MOM, LIVES IN BASEMENT APT, CAN STAY ON MAIN FLOOR, RAMP TO ENTER THIS LEVEL, 14 TIM BASEMENT, AMB W/CANE/FWW, WALK INSHOWER BARS, DOES CHORES, DRIVES)     Precautions:  Precautions  LE Weight Bearing Status: Weight Bearing as Tolerated (LLE)  Medical Precautions: Fall precautions  Post-Surgical Precautions: Left hip precautions       Objective   Pain:  Pain Assessment  Pain Score:  (NO PAIN IN BED, \"A LITTLE WITH MOBILITY\")  Cognition:  Cognition  Overall Cognitive Status: Within Functional Limits  Safety/Judgement:  (CANDI PRECAUTIONS)    General Assessments:  General Observation  General Observation:  (FATIGUED AND MILD SOB W/ ACTIVITY, MOTIVATED)               Activity Tolerance  Endurance: Tolerates 30 min exercise with multiple rests    Sensation  Sensation Comment:  (NERVE BLOCK LLE)    Strength  Strength Comments: ROM RLE/ LKNEE ANDANKLE WFL, L HIP 0-90 DEGREES, STRENGTH RLE 3+/5, L KNEEAND ANKLE 3/5, L HIP 2/5                     Static Sitting Balance  Static Sitting-Comment/Number of Minutes: FAIR  Dynamic Sitting Balance  Dynamic Sitting-Comments: FAIR    Static Standing Balance  Static Standing-Comment/Number of Minutes: FAIR-/POOR+  Dynamic Standing Balance  Dynamic Standing-Comments: FAIR-/POOR+  Functional Assessments:  Bed Mobility  Bed Mobility:  (MOD X1-2 SUPINE<>SIT, SITS EOB FOR 12 MIN SBA, HOB 30 DEGREES)    Transfers  Transfer:  (SIT<>STAND- ATTEMPTED W/ 1 A NOT ABLE TO PUSH THROUGH RLE TO STAND, TRIED AGAIN MOD A X2 2 REPS W/ SUCCESS, FWW WBAT LLE, VC FRO PRECAUTIONS)    Ambulation/Gait Training  Ambulation/Gait Training Performed:  (STOOD 2X W/ MOD X2 A LLE UNSTEADY UNDER HER,  FWWUSED, MARCHED IN PLACE 1ST TIME UP HAD DIFFICULTY STANDING STRAIGHT AND WT SHIFTING, SAT RESTED 3 MIN, 2ND STAND MARCHED 3 REPS AND ABLE TO AMB 2 FT TO R TOWARDS HOB W/ VC FOR SEQUENCING, LLE UNSTEADY)                      Outcome Measures:  [facility] Basic " Mobility  Turning from your back to your side while in a flat bed without using bedrails: A lot  Moving from lying on your back to sitting on the side of a flat bed without using bedrails: A lot  Moving to and from bed to chair (including a wheelchair): A lot  Standing up from a chair using your arms (e.g. wheelchair or bedside chair): A lot  To walk in hospital room: A lot  Climbing 3-5 steps with railing: Total  Basic Mobility - Total Score: 11    Encounter Problems       Encounter Problems (Active)       Mobility       STG - Patient will recall and demonstrate 3 out of 3 total hip precautions during mobility (Not Progressing)       Start:  12/14/23    Expected End:  12/18/23            STG - Patient will navigate 4-6 steps with rails/device (Not Progressing)       Start:  12/14/23    Expected End:  12/19/23       MIN A WBAT LLE         STG - Patient will ambulate (Not Progressing)       Start:  12/14/23    Expected End:  12/20/23       100 FT FWW, CGA USING PROPER GAIT PATTERN         STRENGTHENING (Not Progressing)       Start:  12/14/23    Expected End:  12/28/23       20+ REPS AROM LLE AND RROM RLE IMPROVING GAIT STABITLIY            Pain - Adult          Transfers       STG - Patient to transfer to and from sit to supine (Not Progressing)       Start:  12/14/23    Expected End:  12/20/23       MIN HOB FLAT NO RAILS         STG - Patient will transfer sit to and from stand (Not Progressing)       Start:  12/14/23    Expected End:  12/20/23       FWW WBAT LLE, USING PROPER TECHNIQUE FOR CANDI LLE                Education Documentation  Precautions, taught by Laura Tabares, PT at 12/14/2023  4:42 PM.  Learner: Patient  Readiness: Acceptance  Method: Explanation, Handout, Demonstration  Response: Verbalizes Understanding, Needs Reinforcement  Comment: CANDI PRECAUTIONS, MOBITLY SAFETY POST CANDI, FWW SAFETY    Mobility Training, taught by Laura Tabares, PT at 12/14/2023  4:42 PM.  Learner: Patient  Readiness:  Acceptance  Method: Explanation, Handout, Demonstration  Response: Verbalizes Understanding, Needs Reinforcement  Comment: CANDI PRECAUTIONS, MOBITLY SAFETY POST CANDI, FWW SAFETY    Education Comments  No comments found.

## 2023-12-14 NOTE — CARE PLAN
Problem: Fall/Injury  Goal: Not fall by end of shift  Outcome: Progressing  Goal: Be free from injury by end of the shift  Outcome: Progressing  Goal: Verbalize understanding of personal risk factors for fall in the hospital  Outcome: Progressing  Goal: Verbalize understanding of risk factor reduction measures to prevent injury from fall in the home  Outcome: Progressing  Goal: Use assistive devices by end of the shift  Outcome: Progressing  Goal: Pace activities to prevent fatigue by end of the shift  Outcome: Progressing   The patient's goals for the shift include pt will maintain a pain level of a 6 or mless this shift.    The clinical goals for the shift include pt will ambulate with staff/PT this shift.

## 2023-12-15 ENCOUNTER — HOME HEALTH ADMISSION (OUTPATIENT)
Dept: HOME HEALTH SERVICES | Facility: HOME HEALTH | Age: 63
End: 2023-12-15
Payer: MEDICARE

## 2023-12-15 ENCOUNTER — PHARMACY VISIT (OUTPATIENT)
Dept: PHARMACY | Facility: CLINIC | Age: 63
End: 2023-12-15
Payer: COMMERCIAL

## 2023-12-15 VITALS
BODY MASS INDEX: 42.11 KG/M2 | TEMPERATURE: 97.9 F | OXYGEN SATURATION: 93 % | DIASTOLIC BLOOD PRESSURE: 75 MMHG | SYSTOLIC BLOOD PRESSURE: 118 MMHG | WEIGHT: 262 LBS | HEIGHT: 66 IN | RESPIRATION RATE: 16 BRPM | HEART RATE: 74 BPM

## 2023-12-15 PROBLEM — M16.12 ARTHRITIS OF LEFT HIP: Status: RESOLVED | Noted: 2023-08-16 | Resolved: 2023-12-15

## 2023-12-15 PROBLEM — M16.12 PRIMARY LOCALIZED OSTEOARTHRITIS OF LEFT HIP: Status: RESOLVED | Noted: 2023-12-14 | Resolved: 2023-12-15

## 2023-12-15 LAB
ABO GROUP (TYPE) IN BLOOD: NORMAL
ERYTHROCYTE [DISTWIDTH] IN BLOOD BY AUTOMATED COUNT: 14.5 % (ref 11.5–14.5)
HCT VFR BLD AUTO: 28.7 % (ref 36–46)
HGB BLD-MCNC: 9.2 G/DL (ref 12–16)
MCH RBC QN AUTO: 28.8 PG (ref 26–34)
MCHC RBC AUTO-ENTMCNC: 32.1 G/DL (ref 32–36)
MCV RBC AUTO: 90 FL (ref 80–100)
NRBC BLD-RTO: 0 /100 WBCS (ref 0–0)
PLATELET # BLD AUTO: 250 X10*3/UL (ref 150–450)
RBC # BLD AUTO: 3.2 X10*6/UL (ref 4–5.2)
RH FACTOR (ANTIGEN D): NORMAL
WBC # BLD AUTO: 11.8 X10*3/UL (ref 4.4–11.3)

## 2023-12-15 PROCEDURE — 2500000004 HC RX 250 GENERAL PHARMACY W/ HCPCS (ALT 636 FOR OP/ED): Performed by: SPECIALIST

## 2023-12-15 PROCEDURE — 2500000001 HC RX 250 WO HCPCS SELF ADMINISTERED DRUGS (ALT 637 FOR MEDICARE OP): Performed by: SPECIALIST

## 2023-12-15 PROCEDURE — 7100000011 HC EXTENDED STAY RECOVERY HOURLY - NURSING UNIT

## 2023-12-15 PROCEDURE — 97535 SELF CARE MNGMENT TRAINING: CPT | Mod: GO

## 2023-12-15 PROCEDURE — 97110 THERAPEUTIC EXERCISES: CPT | Mod: CQ,GP

## 2023-12-15 PROCEDURE — 36415 COLL VENOUS BLD VENIPUNCTURE: CPT | Performed by: SPECIALIST

## 2023-12-15 PROCEDURE — 97165 OT EVAL LOW COMPLEX 30 MIN: CPT | Mod: GO

## 2023-12-15 PROCEDURE — RXMED WILLOW AMBULATORY MEDICATION CHARGE

## 2023-12-15 PROCEDURE — 97116 GAIT TRAINING THERAPY: CPT | Mod: CQ,GP

## 2023-12-15 PROCEDURE — 85027 COMPLETE CBC AUTOMATED: CPT | Performed by: SPECIALIST

## 2023-12-15 RX ORDER — HYDROCODONE BITARTRATE AND ACETAMINOPHEN 5; 325 MG/1; MG/1
2 TABLET ORAL EVERY 6 HOURS PRN
Qty: 40 TABLET | Refills: 0 | Status: SHIPPED | OUTPATIENT
Start: 2023-12-15 | End: 2024-01-22 | Stop reason: ALTCHOICE

## 2023-12-15 RX ADMIN — ASPIRIN 325 MG: 325 TABLET, COATED ORAL at 09:40

## 2023-12-15 RX ADMIN — LORATADINE 10 MG: 10 TABLET ORAL at 09:40

## 2023-12-15 RX ADMIN — ESCITALOPRAM OXALATE 10 MG: 10 TABLET ORAL at 09:40

## 2023-12-15 RX ADMIN — SODIUM CHLORIDE 100 ML/HR: 9 INJECTION, SOLUTION INTRAVENOUS at 01:15

## 2023-12-15 RX ADMIN — BUPROPION HYDROCHLORIDE 150 MG: 150 TABLET, FILM COATED, EXTENDED RELEASE ORAL at 09:39

## 2023-12-15 RX ADMIN — HYDROCODONE BITARTRATE AND ACETAMINOPHEN 1 TABLET: 5; 325 TABLET ORAL at 09:40

## 2023-12-15 ASSESSMENT — COGNITIVE AND FUNCTIONAL STATUS - GENERAL
TURNING FROM BACK TO SIDE WHILE IN FLAT BAD: A LITTLE
MOVING TO AND FROM BED TO CHAIR: A LITTLE
DAILY ACTIVITIY SCORE: 19
CLIMB 3 TO 5 STEPS WITH RAILING: A LITTLE
MOBILITY SCORE: 18
TOILETING: A LITTLE
DRESSING REGULAR UPPER BODY CLOTHING: A LITTLE
MOVING TO AND FROM BED TO CHAIR: A LITTLE
STANDING UP FROM CHAIR USING ARMS: A LITTLE
PERSONAL GROOMING: A LITTLE
CLIMB 3 TO 5 STEPS WITH RAILING: A LITTLE
MOVING TO AND FROM BED TO CHAIR: A LITTLE
MOBILITY SCORE: 18
MOBILITY SCORE: 18
MOVING FROM LYING ON BACK TO SITTING ON SIDE OF FLAT BED WITH BEDRAILS: A LITTLE
STANDING UP FROM CHAIR USING ARMS: A LITTLE
WALKING IN HOSPITAL ROOM: A LITTLE
WALKING IN HOSPITAL ROOM: A LITTLE
MOVING FROM LYING ON BACK TO SITTING ON SIDE OF FLAT BED WITH BEDRAILS: A LITTLE
DAILY ACTIVITIY SCORE: 21
MOVING FROM LYING ON BACK TO SITTING ON SIDE OF FLAT BED WITH BEDRAILS: A LITTLE
DRESSING REGULAR LOWER BODY CLOTHING: A LITTLE
WALKING IN HOSPITAL ROOM: A LITTLE
TOILETING: A LITTLE
CLIMB 3 TO 5 STEPS WITH RAILING: A LITTLE
STANDING UP FROM CHAIR USING ARMS: A LITTLE
HELP NEEDED FOR BATHING: A LITTLE
HELP NEEDED FOR BATHING: A LITTLE
DRESSING REGULAR LOWER BODY CLOTHING: A LITTLE
TURNING FROM BACK TO SIDE WHILE IN FLAT BAD: A LITTLE
TURNING FROM BACK TO SIDE WHILE IN FLAT BAD: A LITTLE

## 2023-12-15 ASSESSMENT — ACTIVITIES OF DAILY LIVING (ADL)
LACK_OF_TRANSPORTATION: NO
HOME_MANAGEMENT_TIME_ENTRY: 13
ADL_ASSISTANCE: INDEPENDENT

## 2023-12-15 ASSESSMENT — PAIN - FUNCTIONAL ASSESSMENT
PAIN_FUNCTIONAL_ASSESSMENT: 0-10

## 2023-12-15 ASSESSMENT — PAIN DESCRIPTION - ORIENTATION: ORIENTATION: LEFT

## 2023-12-15 ASSESSMENT — PAIN SCALES - GENERAL
PAINLEVEL_OUTOF10: 4
PAINLEVEL_OUTOF10: 3
PAINLEVEL_OUTOF10: 6

## 2023-12-15 ASSESSMENT — PAIN DESCRIPTION - LOCATION: LOCATION: HIP

## 2023-12-15 NOTE — PROGRESS NOTES
12/15/23 1029   Discharge Planning   Living Arrangements Parent   Support Systems Parent   Assistance Needed None   Type of Residence Private residence   Number of Stairs to Enter Residence 0   Number of Stairs Within Residence 14   Who is requesting discharge planning? Provider   Home or Post Acute Services In home services   Type of Home Care Services Home PT   Patient expects to be discharged to: Home with [facility]   Does the patient need discharge transport arranged? No   Financial Resource Strain   How hard is it for you to pay for the very basics like food, housing, medical care, and heating? Not very   Housing Stability   In the last 12 months, was there a time when you were not able to pay the mortgage or rent on time? N   In the last 12 months, how many places have you lived? 1   In the last 12 months, was there a time when you did not have a steady place to sleep or slept in a shelter (including now)? N   Transportation Needs   In the past 12 months, has lack of transportation kept you from medical appointments or from getting medications? no   In the past 12 months, has lack of transportation kept you from meetings, work, or from getting things needed for daily living? No     Spoke with patient, she lives with her mother, there is a ramp to enter the home, patient is normally independent with all self care, drives, she states that her apartment is the basement of the home, but she will be staying on the main level for about 6 weeks.  Patient agrees to [facility] for her home care, referral was placed by Dr. De Jesus, patient has a walker at home.  Patient plans to return home today, she denies any additional home going needs at this time.

## 2023-12-15 NOTE — PROGRESS NOTES
Physical Therapy  Physical Therapy Treatment    Patient Name: Elysia Zuniga  MRN: [id number]  Today's Date: 12/15/2023  Time Calculation  Start Time: 1200  Stop Time: 1215  Time Calculation (min): 15 min     Assessment/Plan   PT Plan  Treatment/Interventions: Bed mobility, Transfer training, Gait training, Stair training, Strengthening (REINFORCE PRECAUTIONS/SAFETY POST CANDI)  PT Plan: Skilled PT  PT Frequency: 5 times per week (BID)  PT Discharge Recommendations: Low intensity level of continued care  Equipment Recommended upon Discharge:  (SHOWER SEAT)  PT Recommended Transfer Status: Assist x2 (FWW WBAT LLE, THAPOSTERIOR PRECAUTIONS)  PT - OK to Discharge: Yes (TO NEXT LOC WHENM EDICALLY CLEARED)    General Visit Information:   PT  Visit  PT Received On: 12/15/23  Response to Previous Treatment: Patient with no complaints from previous session.    Reason for Referral: L CANDI  Prior to Session Communication: Bedside nurse  Patient Position Received: Bed, 3 rail up, Alarm on  Room: 3334    Subjective   Precautions:  WBAT LLE     Objective   Pain:  Pain Assessment  Pain Assessment: 0-10  Pain Score: 4  Pain Type:  (L hip pain)    Cognition:  Cognition  Overall Cognitive Status: Within Functional Limits    Activity Tolerance:  Activity Tolerance  Endurance: Tolerates 10 - 20 min exercise with multiple rests    Treatments:  Ambulation/Gait Training  120 feet x1 rep with FWW, Supervision    Transfers  Sit to stand: Supervision  Stand to sit: Supervision  Transfer Device: Rolling Walker    Other Activity  Other Activity Performed:  (chair pre/post tx)    Outcome Measures:  [facility] Basic Mobility  Turning from your back to your side while in a flat bed without using bedrails: A little  Moving from lying on your back to sitting on the side of a flat bed without using bedrails: A little  Moving to and from bed to chair (including a wheelchair): A little  Standing up from a chair using your arms (e.g. wheelchair or bedside  chair): A little  To walk in hospital room: A little  Climbing 3-5 steps with railing: A little  Basic Mobility - Total Score: 18    Education Documentation  Mobility Training, taught by Chas Boothe PTA at 12/15/2023  9:29 AM.  Learner: Patient  Readiness: Acceptance  Method: Explanation, Demonstration  Response: Verbalizes Understanding, Demonstrated Understanding    Education Comments  No comments found.        OP EDUCATION:       Encounter Problems       Encounter Problems (Resolved)       Mobility       STG - Patient will recall and demonstrate 3 out of 3 total hip precautions during mobility (Adequate for Discharge)       Start:  12/14/23    Expected End:  12/19/23    Resolved:  12/15/23         STG - Patient will navigate 4-6 steps with rails/device (Adequate for Discharge)       Start:  12/14/23    Expected End:  12/19/23    Resolved:  12/15/23    MIN A WBAT LLE         STG - Patient will ambulate (Adequate for Discharge)       Start:  12/14/23    Expected End:  12/19/23    Resolved:  12/15/23    100 FT FWW, CGA USING PROPER GAIT PATTERN         STRENGTHENING (Adequate for Discharge)       Start:  12/14/23    Expected End:  12/19/23    Resolved:  12/15/23    20+ REPS AROM LLE AND RROM RLE IMPROVING GAIT STABITLIY            Pain - Adult          Transfers       STG - Patient to transfer to and from sit to supine (Adequate for Discharge)       Start:  12/14/23    Expected End:  12/19/23    Resolved:  12/15/23    MIN HOB FLAT NO RAILS         STG - Patient will transfer sit to and from stand (Adequate for Discharge)       Start:  12/14/23    Expected End:  12/19/23    Resolved:  12/15/23    FWW WBAT LLE, USING PROPER TECHNIQUE FOR CANDI LLE

## 2023-12-15 NOTE — CARE PLAN
The patient's goals for the shift include pt will maintain a pain level of a 6 or mless this shift.    The clinical goals for the shift include pt will remain comfortable    Problem: Fall/Injury  Goal: Not fall by end of shift  Outcome: Progressing  Goal: Be free from injury by end of the shift  Outcome: Progressing  Goal: Verbalize understanding of personal risk factors for fall in the hospital  Outcome: Progressing  Goal: Verbalize understanding of risk factor reduction measures to prevent injury from fall in the home  Outcome: Progressing  Goal: Use assistive devices by end of the shift  Outcome: Progressing  Goal: Pace activities to prevent fatigue by end of the shift  Outcome: Progressing     Problem: Pain  Goal: Takes deep breaths with improved pain control throughout the shift  Outcome: Progressing  Goal: Turns in bed with improved pain control throughout the shift  Outcome: Progressing  Goal: Walks with improved pain control throughout the shift  Outcome: Progressing  Goal: Performs ADL's with improved pain control throughout shift  Outcome: Progressing  Goal: Participates in PT with improved pain control throughout the shift  Outcome: Progressing  Goal: Free from opioid side effects throughout the shift  Outcome: Progressing  Goal: Free from acute confusion related to pain meds throughout the shift  Outcome: Progressing     Problem: Skin  Goal: Decreased wound size/increased tissue granulation at next dressing change  Outcome: Progressing  Goal: Participates in plan/prevention/treatment measures  Outcome: Progressing  Goal: Prevent/manage excess moisture  Outcome: Progressing  Goal: Prevent/minimize sheer/friction injuries  Outcome: Progressing  Flowsheets (Taken 12/15/2023 0022)  Prevent/minimize sheer/friction injuries:   HOB 30 degrees or less   Increase activity/out of bed for meals  Goal: Promote/optimize nutrition  Outcome: Progressing  Goal: Promote skin healing  Outcome: Progressing     Problem:  Pain - Adult  Goal: Verbalizes/displays adequate comfort level or baseline comfort level  Outcome: Progressing     Problem: Safety - Adult  Goal: Free from fall injury  Outcome: Progressing     Problem: Discharge Planning  Goal: Discharge to home or other facility with appropriate resources  Outcome: Progressing     Problem: Chronic Conditions and Co-morbidities  Goal: Patient's chronic conditions and co-morbidity symptoms are monitored and maintained or improved  Outcome: Progressing

## 2023-12-15 NOTE — DISCHARGE SUMMARY
Discharge Diagnosis  Left Total Hip Replacement    Issues Requiring Follow-Up  Home care services to start within 48 hours. Outpatient PT to start per surgeon instructions.    Test Results Pending At Discharge  Pending Labs       No current pending labs.            Hospital Course  Patient underwent surgery for Left Total Hip Replacement without complications. Patient was then takent to the PACU in stabe condition. Patient was then transferred to the [facility].  Pain was appropriately controlled and weaned to oral medications. Diet was advanced as tolerated. PT/OT was consulted to begin on post operative day 0 and recommended Home for patient on discharge. Patient had uneventful hospital course. Patient is to follow-up in 6 weeks at scheduled post-op visit.      Face to Face following surgical procedure on 12/15/23. The patient was awake and alert. VSS, afebrile. Sensation intact bilaterally, sural/saph/sp/tibal n. Motor intact flexion/extension/DF/PF/EHL/FHL bilaterally. Palpable symmetric DP/PT pulse bilaterally.    Pertinent Physical Exam At Time of Discharge  Physical Exam  Vitals and nursing note reviewed. Exam conducted with a chaperone present.   Constitutional:       Appearance: Normal appearance.   HENT:      Head: Normocephalic and atraumatic.   Eyes:      Extraocular Movements: Extraocular movements intact.   Cardiovascular:      Rate and Rhythm: Normal rate and regular rhythm.      Pulses: Normal pulses.      Heart sounds: Normal heart sounds.   Pulmonary:      Effort: Pulmonary effort is normal.   Abdominal:      Palpations: Abdomen is soft.   Musculoskeletal:      Dressing dry and intact dermis intact neurovascular intact, no signs of compartment syndrome negative Homans.  Good passive and active motion of left lower extremity.    Skin:     General: Skin is warm and dry.      Capillary Refill: Capillary refill takes less than 2 seconds.   Neurological:      General: No focal deficit present.      Mental  Status: Patient is alert and oriented to person, place, and time. Mental status is at baseline.   Psychiatric:         Mood and Affect: Mood normal.         Thought Content: Thought content normal.         Judgment: Judgment normal.           Home Medications  Medication List      Your medication list        START taking these medications        Instructions Last Dose Given Next Dose Due   HYDROcodone-acetaminophen 5-325 mg tablet  Commonly known as: Norco      Take 2 tablets by mouth every 6 hours if needed for moderate pain (4 - 6).              CONTINUE taking these medications        Instructions Last Dose Given Next Dose Due   aspirin 325 mg tablet           buPROPion  mg 24 hr tablet  Commonly known as: Wellbutrin XL      Take 1 tablet (150 mg) by mouth once daily in the morning. Do not crush, chew, or split.       busPIRone 15 mg tablet  Commonly known as: Buspar      Take 1 tablet (15 mg) by mouth 3 times a day as needed (anxiety or stress).       cetirizine 10 mg tablet  Commonly known as: ZyrTEC           diclofenac sodium 1 % gel gel  Commonly known as: Voltaren      Apply 1 Application topically 4 times a day as needed (pain).       escitalopram 10 mg tablet  Commonly known as: Lexapro      Take 1 tablet (10 mg) by mouth once daily.       melatonin 10 mg tablet,chewable           multivitamin tablet           RANITIDINE HCL ORAL           saccharomyces boulardii 250 mg capsule  Commonly known as: Florastor           Vitamin C 1,000 mg tablet  Generic drug: ascorbic acid                  STOP taking these medications      acetaminophen 650 mg ER tablet  Commonly known as: Tylenol 8 HOUR        sulfamethoxazole-trimethoprim 800-160 mg tablet  Commonly known as: Bactrim DS                  Where to Get Your Medications        These medications were sent to [facility]  [address] [place], [place] [id number]      Hours: 8AM to 6PM Mon-Fri, 8AM to 2PM Sat, 8AM to 12PM Sun Phone:  [phone]   HYDROcodone-acetaminophen 5-325 mg tablet             Outpatient Follow-Up  Patient to follow up in three weeks with Dr. De Jesus  Special Instructions    Please read discharge instructions provided by your surgeon before calling with questions as this will delay care.    Medication refills-Narcotic pain medication will be refilled every 7 days per state law. Please request refills through [employer] preferably/[phone]. All medication requests may take up to 72 hours to refill and refills after Friday 1pm will be refilled on the next business day.

## 2023-12-15 NOTE — PROGRESS NOTES
Physical Therapy  Physical Therapy Treatment    Patient Name: Elysia Zuniga  MRN: [id number]  Today's Date: 12/15/2023  Time Calculation  Start Time: 0826  Stop Time: 0854  Time Calculation (min): 28 min     Assessment/Plan   PT Plan  Treatment/Interventions: Bed mobility, Transfer training, Gait training, Stair training, Strengthening (REINFORCE PRECAUTIONS/SAFETY POST CANDI)  PT Plan: Skilled PT  PT Frequency: 5 times per week (BID)  PT Discharge Recommendations: Low intensity level of continued care  Equipment Recommended upon Discharge:  (SHOWER SEAT)  PT Recommended Transfer Status: Assist x2 (FWW WBAT LLE, THAPOSTERIOR PRECAUTIONS)  PT - OK to Discharge: Yes (TO NEXT LOC WHENM EDICALLY CLEARED)    General Visit Information:   PT  Visit  PT Received On: 12/15/23  Response to Previous Treatment: Patient with no complaints from previous session.    Reason for Referral: L CANDI  Prior to Session Communication: Bedside nurse  Patient Position Received: Bed, 3 rail up, Alarm on  Room: 3334    Subjective   Precautions:  WBAT LLE     Objective   Pain:  pt reports 3/10 L hip pain at rest, 6/10 L hip pin with WBing  Nursing aware. Pt medicated for pain following tx.    Cognition:  Oriented x4    Activity Tolerance:  Activity Tolerance  Endurance: Tolerates 10 - 20 min exercise with multiple rests    Treatments:  Therapeutic Exercise  1x10 reps B LAQ, heel raises, GS    Bed Mobility  Supine to sitting: Supervision  Scooting: Supervision    Ambulation/Gait Training  75 feet x2 reps with RW, Supervision  VC for posture, sequencing  No bucking or LOB noted    Transfers  Sit to stand: Supervision  Stand to sit: Supervision  Commode transfer: Supervision  Car transfer: Verbal/visual demonstration: pt verbalized understanding    Pt remained sitting in chair following tx. All needs in reach.    Outcome Measures:  [facility] Basic Mobility  Turning from your back to your side while in a flat bed without using bedrails: A little  Moving  from lying on your back to sitting on the side of a flat bed without using bedrails: A little  Moving to and from bed to chair (including a wheelchair): A little  Standing up from a chair using your arms (e.g. wheelchair or bedside chair): A little  To walk in hospital room: A little  Climbing 3-5 steps with railing: A little  Basic Mobility - Total Score: 18    Education Documentation  Mobility Training, taught by Chas Boothe PTA at 12/15/2023  9:29 AM.  Learner: Patient  Readiness: Acceptance  Method: Explanation, Demonstration  Response: Verbalizes Understanding, Demonstrated Understanding    Education Comments  No comments found.        OP EDUCATION:       Encounter Problems       Encounter Problems (Active)       Mobility       STG - Patient will recall and demonstrate 3 out of 3 total hip precautions during mobility (Progressing)       Start:  12/14/23    Expected End:  12/19/23            STG - Patient will navigate 4-6 steps with rails/device (Progressing)       Start:  12/14/23    Expected End:  12/19/23       MIN A WBAT LLE         STG - Patient will ambulate (Progressing)       Start:  12/14/23    Expected End:  12/19/23       100 FT FWW, CGA USING PROPER GAIT PATTERN         STRENGTHENING (Progressing)       Start:  12/14/23    Expected End:  12/19/23       20+ REPS AROM LLE AND RROM RLE IMPROVING GAIT STABITLIY            Pain - Adult          Transfers       STG - Patient to transfer to and from sit to supine (Progressing)       Start:  12/14/23    Expected End:  12/19/23       MIN HOB FLAT NO RAILS         STG - Patient will transfer sit to and from stand (Progressing)       Start:  12/14/23    Expected End:  12/19/23       FWW WBAT LLE, USING PROPER TECHNIQUE FOR CANDI LLE

## 2023-12-15 NOTE — CARE PLAN
The patient's goals for the shift include pt will maintain a pain level of a 6 or mless this shift.    Problem: Fall/Injury  Goal: Not fall by end of shift  Outcome: Adequate for Discharge  Goal: Be free from injury by end of the shift  Outcome: Adequate for Discharge  Goal: Verbalize understanding of personal risk factors for fall in the hospital  Outcome: Adequate for Discharge  Goal: Verbalize understanding of risk factor reduction measures to prevent injury from fall in the home  Outcome: Adequate for Discharge  Goal: Use assistive devices by end of the shift  Outcome: Adequate for Discharge  Goal: Pace activities to prevent fatigue by end of the shift  Outcome: Adequate for Discharge     Problem: Pain  Goal: Takes deep breaths with improved pain control throughout the shift  Outcome: Adequate for Discharge  Goal: Turns in bed with improved pain control throughout the shift  Outcome: Adequate for Discharge  Goal: Walks with improved pain control throughout the shift  Outcome: Adequate for Discharge  Goal: Performs ADL's with improved pain control throughout shift  Outcome: Adequate for Discharge  Goal: Participates in PT with improved pain control throughout the shift  Outcome: Adequate for Discharge  Goal: Free from opioid side effects throughout the shift  Outcome: Adequate for Discharge  Goal: Free from acute confusion related to pain meds throughout the shift  Outcome: Adequate for Discharge     Problem: Skin  Goal: Decreased wound size/increased tissue granulation at next dressing change  Outcome: Adequate for Discharge  Goal: Participates in plan/prevention/treatment measures  Outcome: Adequate for Discharge  Goal: Prevent/manage excess moisture  Outcome: Adequate for Discharge  Goal: Prevent/minimize sheer/friction injuries  Outcome: Adequate for Discharge  Goal: Promote/optimize nutrition  Outcome: Adequate for Discharge  Goal: Promote skin healing  Outcome: Adequate for Discharge     Problem: Pain -  Adult  Goal: Verbalizes/displays adequate comfort level or baseline comfort level  Outcome: Adequate for Discharge     Problem: Safety - Adult  Goal: Free from fall injury  Outcome: Adequate for Discharge     Problem: Discharge Planning  Goal: Discharge to home or other facility with appropriate resources  Outcome: Adequate for Discharge     Problem: Chronic Conditions and Co-morbidities  Goal: Patient's chronic conditions and co-morbidity symptoms are monitored and maintained or improved  Outcome: Adequate for Discharge     Problem: Mobility  Goal: STG - Patient will recall and demonstrate 3 out of 3 total hip precautions during mobility  Outcome: Adequate for Discharge  Goal: STG - Patient will navigate 4-6 steps with rails/device  Outcome: Adequate for Discharge  Goal: STG - Patient will ambulate  Outcome: Adequate for Discharge  Goal: STRENGTHENING  Outcome: Adequate for Discharge     Problem: Transfers  Goal: STG - Patient to transfer to and from sit to supine  Outcome: Adequate for Discharge  Goal: STG - Patient will transfer sit to and from stand  Outcome: Adequate for Discharge       The clinical goals for the shift include pt will remain comfortable    See assessment and mar. Remains on room air. Received prescriptions. IVF stopped as ordered.

## 2023-12-15 NOTE — DISCHARGE INSTRUCTIONS
PLEASE READ CAREFULLY BEFORE CONTACTING YOUR PROVIDER.    WE WORK COLLABORATIVELY AS A TEAM. CALLING MULTIPLE STAFF MEMBERS REGARDING THE SAME ISSUE WILL DELAY YOUR CARE.  MYCHART IS THE PREFERRED COMMUNICATION FOR ALL TEAM MEMBERS.  ________________________________________________________________________________________________________________________________________________________________________    After Surgery Instructions: TOTAL HIP ARTHROPLASTY    The following is a general guide and may be applied to most patients that go home from the hospital after surgery. If you go to a rehab facility the timeline may deviate a little. Please do not hesitate to call our office for any questions or additional guidance. We will work with you to get the best experience from your new joint replacement.     WEEK 1  Relax…Don't Overdo it!  - Get up once an hour for light activity while you are awake. (get a drink, go to the bathroom, etc.)  - Keep the surgical site iced and elevated above your heart, if possible, while you are resting.  - You will have some light exercises given to you from the physical therapist in the hospital. They will teach you posterior hip precautions that you should be mindful of for the first 3 months after surgery.    SWELLING AND BRUISING    BRUISING AND SWELLING AFTER SURGERY IS NORMAL. As the patient becomes more mobile the bruising and swelling will begin to migrate towards the ankle and foot and is usually noticed toward the end of the day.    WEEK 2-3  You will be set up for home physical therapy and Occupational Therapy, for the first 3 to 4 weeks, then will recommend outpatient physical therapy. Be sure to do the home exercises on the days you are not attending physical therapy.    - Continue to progress walking as tolerated.   - Continue to use ice for soreness on the surgical site  - You may wean from your walker to a cane when you feel safe and comfortable to do so. Your physical  therapist will also be helpful with progressing your assistive device, which may be needed for up to 6 or more weeks.  - Be careful with bending and twisting - If it hurts, stop!!    FOLLOW UP  - Your first post-operative visit with Dr. De Jesus will be 3 weeks postop.  Please call [phone] for appointment questions  - You will need new hip X-rays for this visit, and will be instructed to arrive early to obtain before appointment.  - Don't be nervous! This visit is to see how you are doing and make sure your incision is healing nicely and have begun working with physical therapy.       MEDICATION REFILLS - Please call main office number: [phone]    You will not receive a call indicating that your prescription has been filled.  Please contact your pharmacy with any questions.    Medication refills will be filled Monday-Friday 7am to 1pm ONLY. Please call the office or send a [facility] message for a refill request.  Any requests received outside of this timeframe will be handled on the next business day.  The office staff and orthopedic nurses cannot refill medications; messages should be left directly through the office or via my chart.  Please do not call multiple times or call other members of the care team for medication needs, this will cause the refill to take longer.    Per State and Institutional policy, pain medications can only be refilled every 7 days for up to six weeks following surgery. Dr De Jesus does not believe in using narcotics for more than 4 wks after surgery, beyond that you may be referred to pain management physician, if your recovery is otherwise as expected.        DRIVING & TRAVEL AFTER SURGERY   Patients should anticipate waiting at least 3-6 weeks before traveling long distances after surgery.  At minimum you cannot be using your walker before considering driving and you cannot take any narcotic medications prior to driving. You will need to stop to walk around ever 1 hour during  your travel to help with blood clot prevention.  Please call the office or your joint nurse to discuss prior to post-surgical travel.  Patients may not drive until cleared by the joint nurse or the office.    DENTAL PROCEDURES & CLEANINGS  You must wait a minimum of 3 months for elective dental appointments (if possible, we understand emergencies happen), including routine cleanings or dental work including bridges, crowns, extractions, etc..  For any dental visit - cleaning or dental procedures - patients must take an antibiotic 1 hour before and after the appointment.  Antibiotics are needed at least for one year post surgery, before dental appointments.  The antibiotic prescribed will be based on each patient's allergies.    WOUND CARE    You have a waterproof bandage on your wound and may shower with this on. The waterproof bandage is to remain in place for 7 days.  Home physical therapist will help remove bandage 7 days postop.  You may leave your incision open to air after the bandage has been removed.  Your incision is closed with surgical staples.  Those will be removed 2 weeks after surgery by your physical therapist/home nurse.  If any issues with wound healing please call the surgeons office.  You may shower upon discharging from the hospital.  Soap and water is permitted to run over the surgical dressing.  Do not scrub directly over these items.  DO NOT soak your incision in a bath, hot tub, pool or pond/lake for a minimum of 3 weeks following your surgery.  DO NOT use lotions, creams, ointments on your wound for a minimum of 3 weeks following your surgery. At that time you may use vitamin E to assist with softening of your incision.    RESTARTING HOME ROUTINE - DIET & MEDICATIONS  Post-operative constipation can result due to a combination of inactivity, anesthesia and pain medication. To help prevent this, you should increase your water and fiber intake. Physical activity such as walking will also help  stimulate the bowels.   You may resume your normal diet when you discharge home.  Choose foods that help promote good bowel habits and prevent constipation, such as foods high in fiber.  You may restart your home medications the following day after your surgery UNLESS you have been given alternate instructions.  Follow the instructions given to you on your hospital discharge instructions for more information regarding your home medications.    IN-HOME PHYSICAL THERAPY & OUTPATIENT PHYSICAL THERAPY  Continue the exercises you were given in the hospital until you have been seen by in-home therapy.  Make sure to provide a phone number with the ability for the home care staff to leave a message if you do not answer your phone.    Depending on your treatment plan you will begin outpatient or home therapy after surgery. This should be arranged through the office or hospital during discharge  FOR PATIENT DOING HOME THERAPY: Outpatient physical therapy following hip replacement surgery should begin 3-4 weeks after surgery.  You should call to schedule this appointment ASAP if not already scheduled before surgery.  Waiting until you are ready for outpatient physical therapy will cause a delay in your care.  You may choose any outpatient physical therapy location.  Call the office for an order if needed.    EMERGENCIES - WHEN TO CONTACT THE SURGEON'S OFFICE IMMEDIATELY  Fever >101 with chills that has been present for at least 48 hours.   Excessive bleeding from incision that will not slow down. A small amount of drainage is normal and expected.  Once pressure is applied and the area is covered, do not continue to check the area regularly.  This will remove pressure and bleeding will continue.  Leave in place for 4-6 hours.  Signs of infection of incision-excessive drainage that is soaking through your dressing (especially if it is pus-like), redness that is spreading out from the edges of your incision, or increased warmth  around the area.  Excruciating pain for which the pain medication, taken as instructed, is not helping.  Severe calf pain.  Go directly to the emergency room or call 911, if you are experiencing chest pain or difficulty breathing.    ICE & COLD THERAPY INSTRUCTIONS    To assist with pain control and post-op swelling, you should be using ice regularly throughout recovery, especially for the first 6 weeks, regardless of the cold therapy method you use.      Always make sure there is a layer of protection between the cold pad and your skin.    If you are using ICE PACKS or GEL PACKS, you will need to alternate 20 minutes on, 20 minutes off twice per hour.    If you are using an ICE MACHINE, please follow the provided ice machine instructions.  These devices differ from ice or ice packs whereas the mechanism circulates water through tubing and a pad to provide longer periods of cold therapy to the desired site.  You can use your cold devices around the clock for optimal comfort.  We recommend using cold therapy after working with therapy or completing exercises on your own.  There is no set schedule in which you must follow while using cold therapy.  Below are a few points to remember when using a cold therapy device:    You do not need to need to use the 20 on, 20 off method.  Detach the pad from the cooler and ambulate at least once every hour.  You can check your skin under the pad at this time.  You may wear the cold therapy device during periods of sleep including overnight.  If you wake up during the night, you can check the skin at this time.  You do not need to wake up specifically to perform skin checks.  Empty the cooler and pad when device is not in use.  Follow 's instructions for cleaning your cold therapy device.      DISCHARGE MEDICATIONS - Please reference the sample schedule on the reverse side for instructions on how to best schedule medications.    PAIN MEDICATION       Pain medication has  been prescribed for post-operative pain control.  Take as directed, typically 1 tablet every 6 hours for mild to moderate pain, 2 tablets every 6 hours for moderate to severe pain.  If allowed by your primary medical doctor, you could take over-the-counter ibuprofen or Naprosyn in between doses as needed.   Side effects may be constipation and nausea, vomiting, sleepiness, dizziness, lightheadedness, headache, blurred vision, dry mouth sweating, itching (if you have itching, over-the -counter Benadryl can be used as needed).   You may NOT operate a motor vehicle while taking this medication or have been cleared by your surgeon or PA.      NON-NARCOTIC PAIN MEDICATION        You may return to preoperative anti-inflammatory medication if previously prescribed.  One of these may be prescribed (if you are able to take it) as an adjunct anti-inflammatory to assist in pain control. See medication example sheet. You will not receive refills on this medication.   Side effects may include nausea or upset stomach                   Acetaminophen may be used an adjunct for pain control, UNLESS IT IS ALREADY IN YOUR PRESCRIPTION NARCOTIC PAIN MED.   If able, you may take two 500 mg tablet every 6-8 hours for 4 weeks. See medication example sheet. No refills will be given after initial prescription.   Side effects may include nausea, heartburn, drowsiness, and headache.        BLOOD THINNER                Aspirin or Other Blood Thinner   Aspirin or another medication has been prescribed as a blood thinner to prevent blood clots in your leg or lungs. Take as prescribed on the bottle for 4 weeks. You will not receive a refill on this medication.   Do not take this medication if you are on another blood thinner.          STOOL SOFTENERS     Post-operative constipation can result due to a combination of inactivity, anesthesia and pain medication. To help prevent this, you should increase your water and fiber intake. Physical activity  such as walking will also help stimulate the bowels.    Senna can be obtained over-the-counter to help with constipation while on prescription pain medication.  Take one tablet twice daily for 4 weeks.   You may also take Miralax in combination with Senna to prevent constipation.  This can be purchased over the counter at your local pharmacy.  Take as instructed on the bottle.     Medication refills will be sent upon receipt of your request during the times listed above. Due to the high call volume, you will not receive a call confirming prescription refills; please do not call multiple times.  Prescription refills may take a few hours to process, you may follow up with your pharmacy for pickup availability.    You may begin to wean off the pain medication as your pain remains controlled with increased activity.  _________________________________________________________________________________________________________________________________________________________________________    OFFICE INFORMATION    OFFICE LOCATIONS    Location 1: [facility]  [address], [address]  Office Number: [phone]    Location 2: [facility]  [address] [place] [address]  [facility], [address]  Office Number: [phone]

## 2023-12-15 NOTE — PROGRESS NOTES
Occupational Therapy    Evaluation    Patient Name: Elysia Zuniga  MRN: [id number]  Today's Date: 12/15/2023  Time Calculation  Start Time: 1127  Stop Time: 1150  Time Calculation (min): 23 min    Assessment  IP OT Assessment  OT Assessment: OT eval completed. The pt is functioning well for ADLs and mobility. anticipating DC today. 1 time OT session completed and the pt has no other skilled OT needs at this time. DC  End of Session Communication: Bedside nurse  End of Session Patient Position: Up in chair, Alarm off, not on at start of session    Plan:  No Skilled OT: No acute OT goals identified  OT Frequency: OT eval only  OT Discharge Recommendations: No further acute OT, No OT needed after discharge  OT - OK to Discharge: Yes To next level of care, with consideration of recommendations established on OT eval, and once cleared by medical team/physician for DC.     Subjective     Current Problem:  1. Arthritis of hip  HYDROcodone-acetaminophen (Norco) 5-325 mg tablet    Referral to Home Health      2. Pre-op evaluation  CANCELED: Type And Screen    CANCELED: Type And Screen      3. Arthritis of left hip  CANCELED: Type And Screen    CANCELED: Type And Screen          General:  General  Reason for Referral: recent surgery  Referred By: Liza  Past Medical History Relevant to Rehab: admit for elective L CANDI. PMH: OA, depresison, chronic back pain  Patient Position Received: Up in chair, Alarm off, not on at start of session  General Comment: pt alert and agreeable to therapy. anticipating DC today    Precautions:  LE Weight Bearing Status: Weight Bearing as Tolerated  Medical Precautions: Fall precautions  Post-Surgical Precautions: Left hip precautions        Pain:  Pain Assessment  Pain Assessment: 0-10  Pain Score:  (mild)  Pain Type: Acute pain, Surgical pain  Pain Location: Hip  Pain Orientation: Left    Objective     Cognition:  Overall Cognitive Status: Within Functional Limits             Home  Living:  Type of Home: House  Lives With: Parent(s), Siblings  Home Adaptive Equipment: Walker rolling or standard, Cane, Long-handled shoehorn, Long-handled sponge, Sock aid, Reacher  Home Layout: Multi-level  Home Access: Stairs to enter with rails, Ramped entrance     Prior Function:  ADL Assistance: Independent  Homemaking Assistance: Independent  Ambulatory Assistance: Independent      ADL:  Toileting Assistance with Device: Stand by  Toileting Deficit: Verbal cueing, Setup  ADL Comments:  Skilled intervention for ADLS provided. OT instructed patient on ADL techniques and posterior hip precautions in order to to progress performance of ADLs. OT provide hip precaution handout. OT also demonstrated use of reacher, sock aid, shoe horn. Pt verbalized good understanding. Pt and OT also walked through safe techniques to transfer in/out shower, car, and toilet. Reviewed walker safety. Reviewed sleeping positions. Pt was SBA for clothing management and hygiene.   No other skilled OT needs at this level of care. Pt doing well with ADLs.     Bed Mobility/Transfers:      Transfers  Transfer: Yes  Transfer 1  Transfer From 1: Sit to  Transfer to 1: Stand  Transfer Device 1: Walker  Transfer Level of Assistance 1: Close supervision  Transfers 2  Transfer From 2: Chair with arms to  Transfer to 2: Commode-standard  Transfer Device 2: Walker  Transfer Level of Assistance 2: Close supervision, Set up  Transfers 3  Transfer From 3: Commode-standard to  Transfer to 3: Chair with arms  Transfer Device 3: Walker  Transfer Level of Assistance 3: Close supervision, Set up        Strength:  Strength Comments: BUE ROM and strength WFL      Outcome Measures: [facility] Daily Activity  Putting on and taking off regular lower body clothing: A little  Bathing (including washing, rinsing, drying): A little  Putting on and taking off regular upper body clothing: A little  Toileting, which includes using toilet, bedpan or urinal: A little  Taking  care of personal grooming such as brushing teeth: A little  Eating Meals: None  Daily Activity - Total Score: 19                    EDUCATION:     Education Documentation  ADL Training, taught by Zulay Arguello OT at 12/15/2023 12:19 PM.  Learner: Patient  Readiness: Eager  Method: Explanation  Response: Verbalizes Understanding, Demonstrated Understanding    ADL Training, taught by Zulay Arguello OT at 12/15/2023 12:18 PM.  Learner: Patient  Readiness: Eager  Method: Demonstration  Response: Needs Reinforcement    Education Comments  No comments found.

## 2023-12-16 ENCOUNTER — HOME CARE VISIT (OUTPATIENT)
Dept: HOME HEALTH SERVICES | Facility: HOME HEALTH | Age: 63
End: 2023-12-16
Payer: MEDICARE

## 2023-12-16 VITALS
DIASTOLIC BLOOD PRESSURE: 62 MMHG | OXYGEN SATURATION: 94 % | HEIGHT: 66 IN | SYSTOLIC BLOOD PRESSURE: 106 MMHG | WEIGHT: 260 LBS | BODY MASS INDEX: 41.78 KG/M2 | HEART RATE: 86 BPM

## 2023-12-16 PROCEDURE — 169592 NO-PAY CLAIM PROCEDURE

## 2023-12-16 PROCEDURE — 1090000002 HH PPS REVENUE DEBIT

## 2023-12-16 PROCEDURE — 1090000001 HH PPS REVENUE CREDIT

## 2023-12-16 PROCEDURE — 0023 HH SOC

## 2023-12-16 PROCEDURE — G0151 HHCP-SERV OF PT,EA 15 MIN: HCPCS | Mod: HHH

## 2023-12-16 ASSESSMENT — ACTIVITIES OF DAILY LIVING (ADL)
ENTERING_EXITING_HOME: STAND BY ASSIST
AMBULATION ASSISTANCE ON FLAT SURFACES: 1
AMBULATION_DISTANCE/DURATION_TOLERATED: HOUSEHOLD
OASIS_M1830: 05

## 2023-12-16 ASSESSMENT — ENCOUNTER SYMPTOMS
PAIN: 1
HIGHEST PAIN SEVERITY IN PAST 24 HOURS: 8/10
PAIN LOCATION: LEFT HIP
SUBJECTIVE PAIN PROGRESSION: GRADUALLY IMPROVING
OCCASIONAL FEELINGS OF UNSTEADINESS: 1
PERSON REPORTING PAIN: PATIENT
PAIN LOCATION - EXACERBATING FACTORS: STANDING
PAIN LOCATION - PAIN SEVERITY: 2/10

## 2023-12-16 NOTE — CASE COMMUNICATION
Dr. Shrestha,    Although I know you are already fully aware, I am required to inform you that patient Elysia Zuniga screened positive for depression on her home care intake following her total hip replacement.  She denied thoughts of hurting herself.      Regards,  Mason Perez, PT, DPT

## 2023-12-17 PROCEDURE — 1090000001 HH PPS REVENUE CREDIT

## 2023-12-17 PROCEDURE — 1090000002 HH PPS REVENUE DEBIT

## 2023-12-18 PROCEDURE — 1090000001 HH PPS REVENUE CREDIT

## 2023-12-18 PROCEDURE — 1090000002 HH PPS REVENUE DEBIT

## 2023-12-19 ENCOUNTER — HOME CARE VISIT (OUTPATIENT)
Dept: HOME HEALTH SERVICES | Facility: HOME HEALTH | Age: 63
End: 2023-12-19
Payer: MEDICARE

## 2023-12-19 PROCEDURE — 1090000001 HH PPS REVENUE CREDIT

## 2023-12-19 PROCEDURE — G0157 HHC PT ASSISTANT EA 15: HCPCS | Mod: HHH

## 2023-12-19 PROCEDURE — 1090000002 HH PPS REVENUE DEBIT

## 2023-12-19 PROCEDURE — G0152 HHCP-SERV OF OT,EA 15 MIN: HCPCS | Mod: HHH

## 2023-12-19 ASSESSMENT — ENCOUNTER SYMPTOMS
PAIN LOCATION: LEFT HIP
LOWEST PAIN SEVERITY IN PAST 24 HOURS: 3/10
HIGHEST PAIN SEVERITY IN PAST 24 HOURS: 8/10
ANGER WITHIN DEFINED LIMITS: 1
AGGRESSION WITHIN DEFINED LIMITS: 1
OCCASIONAL FEELINGS OF UNSTEADINESS: 1
PAIN: 1
PERSON REPORTING PAIN: PATIENT
SUBJECTIVE PAIN PROGRESSION: GRADUALLY IMPROVING
PAIN LOCATION - PAIN SEVERITY: 6/10

## 2023-12-19 ASSESSMENT — ACTIVITIES OF DAILY LIVING (ADL)
AMBULATION ASSISTANCE: 1
DRESSING_LB_CURRENT_FUNCTION: MINIMUM ASSIST
PHYSICAL TRANSFERS ASSESSED: 1
CURRENT_FUNCTION: STAND BY ASSIST
AMBULATION ASSISTANCE: INDEPENDENT

## 2023-12-20 PROCEDURE — 1090000002 HH PPS REVENUE DEBIT

## 2023-12-20 PROCEDURE — 1090000001 HH PPS REVENUE CREDIT

## 2023-12-21 ENCOUNTER — HOME CARE VISIT (OUTPATIENT)
Dept: HOME HEALTH SERVICES | Facility: HOME HEALTH | Age: 63
End: 2023-12-21
Payer: MEDICARE

## 2023-12-21 PROCEDURE — 1090000002 HH PPS REVENUE DEBIT

## 2023-12-21 PROCEDURE — 1090000001 HH PPS REVENUE CREDIT

## 2023-12-21 PROCEDURE — G0157 HHC PT ASSISTANT EA 15: HCPCS | Mod: HHH

## 2023-12-22 PROCEDURE — 1090000002 HH PPS REVENUE DEBIT

## 2023-12-22 PROCEDURE — 1090000001 HH PPS REVENUE CREDIT

## 2023-12-23 PROCEDURE — 1090000001 HH PPS REVENUE CREDIT

## 2023-12-23 PROCEDURE — 1090000002 HH PPS REVENUE DEBIT

## 2023-12-24 PROCEDURE — 1090000001 HH PPS REVENUE CREDIT

## 2023-12-24 PROCEDURE — 1090000002 HH PPS REVENUE DEBIT

## 2023-12-25 PROCEDURE — 1090000001 HH PPS REVENUE CREDIT

## 2023-12-25 PROCEDURE — 1090000002 HH PPS REVENUE DEBIT

## 2023-12-26 ENCOUNTER — HOME CARE VISIT (OUTPATIENT)
Dept: HOME HEALTH SERVICES | Facility: HOME HEALTH | Age: 63
End: 2023-12-26
Payer: MEDICARE

## 2023-12-26 PROCEDURE — 1090000001 HH PPS REVENUE CREDIT

## 2023-12-26 PROCEDURE — 1090000002 HH PPS REVENUE DEBIT

## 2023-12-26 PROCEDURE — G0157 HHC PT ASSISTANT EA 15: HCPCS | Mod: HHH

## 2023-12-27 PROCEDURE — G0180 MD CERTIFICATION HHA PATIENT: HCPCS | Performed by: SPECIALIST

## 2023-12-27 PROCEDURE — 1090000002 HH PPS REVENUE DEBIT

## 2023-12-27 PROCEDURE — 1090000001 HH PPS REVENUE CREDIT

## 2023-12-28 ENCOUNTER — HOME CARE VISIT (OUTPATIENT)
Dept: HOME HEALTH SERVICES | Facility: HOME HEALTH | Age: 63
End: 2023-12-28
Payer: MEDICARE

## 2023-12-28 PROCEDURE — 1090000001 HH PPS REVENUE CREDIT

## 2023-12-28 PROCEDURE — G0151 HHCP-SERV OF PT,EA 15 MIN: HCPCS | Mod: HHH

## 2023-12-28 PROCEDURE — 1090000002 HH PPS REVENUE DEBIT

## 2023-12-28 ASSESSMENT — ENCOUNTER SYMPTOMS
DENIES PAIN: 1
PAIN LOCATION - PAIN SEVERITY: 3/10
PAIN LOCATION: LEFT HIP
PERSON REPORTING PAIN: PATIENT

## 2023-12-28 ASSESSMENT — ACTIVITIES OF DAILY LIVING (ADL)
HOME_HEALTH_OASIS: 00
OASIS_M1830: 00
AMBULATION ASSISTANCE ON FLAT SURFACES: 1
AMBULATION_DISTANCE/DURATION_TOLERATED: HOUSEHOLD

## 2023-12-29 DIAGNOSIS — M16.10 ARTHRITIS OF HIP: ICD-10-CM

## 2024-01-02 DIAGNOSIS — M16.10 ARTHRITIS OF HIP: ICD-10-CM

## 2024-01-02 DIAGNOSIS — M25.552 PAIN, JOINT, HIP, LEFT: ICD-10-CM

## 2024-01-04 ENCOUNTER — APPOINTMENT (OUTPATIENT)
Dept: PRIMARY CARE | Facility: CLINIC | Age: 64
End: 2024-01-04
Payer: MEDICARE

## 2024-01-04 ENCOUNTER — ANCILLARY PROCEDURE (OUTPATIENT)
Dept: RADIOLOGY | Facility: CLINIC | Age: 64
End: 2024-01-04
Payer: MEDICARE

## 2024-01-04 ENCOUNTER — OFFICE VISIT (OUTPATIENT)
Dept: ORTHOPEDIC SURGERY | Facility: CLINIC | Age: 64
End: 2024-01-04
Payer: MEDICARE

## 2024-01-04 VITALS — HEIGHT: 66 IN | BODY MASS INDEX: 41.78 KG/M2 | WEIGHT: 260 LBS

## 2024-01-04 DIAGNOSIS — M16.10 ARTHRITIS OF HIP: Primary | ICD-10-CM

## 2024-01-04 DIAGNOSIS — M25.552 PAIN, JOINT, HIP, LEFT: ICD-10-CM

## 2024-01-04 DIAGNOSIS — M16.10 ARTHRITIS OF HIP: ICD-10-CM

## 2024-01-04 PROCEDURE — 73502 X-RAY EXAM HIP UNI 2-3 VIEWS: CPT | Mod: LT

## 2024-01-04 PROCEDURE — 99024 POSTOP FOLLOW-UP VISIT: CPT | Performed by: SPECIALIST

## 2024-01-04 PROCEDURE — 73502 X-RAY EXAM HIP UNI 2-3 VIEWS: CPT | Mod: LEFT SIDE | Performed by: RADIOLOGY

## 2024-01-04 PROCEDURE — 3008F BODY MASS INDEX DOCD: CPT | Performed by: SPECIALIST

## 2024-01-04 PROCEDURE — 1036F TOBACCO NON-USER: CPT | Performed by: SPECIALIST

## 2024-01-04 NOTE — PROGRESS NOTES
Postop left CANDI 12/14/2023  Doing well, walking with a walker   Xrays done today    The patient is here for follow-up of their left hip arthroplasty.  The patient has minimal hip pain.  The patient has no mechanical symptoms.  The patient is approximately 3-1/2 weeks postop.  Her only major complaint today is chronic worsening right knee pain.    Patient denies constitutional symptoms, ROS otherwise negative    Physical examination:    Examination of the left hip  The incision is well-healed  No erythema or warmth.  Gait is intact without pathologic components  Range of motion: Is supple and without significant pain or restriction  The Patient's single leg stand and actively abduct, motor exam all intact with appropriate stength  Calf is soft, Homans negative  The patient has intact ankle dorsiflexion and plantarflexion.  Neurovascular exam otherwise within normal      Radiographs: Well aligned left total hip arthroplasty implants with stable bone implant interface no lucencies.    Impression:  Status post left total hip arthroplasty    Plan:  Patient is doing excellent we will continue home program.  She will follow-up in 2 months for routine reassessment of the left hip, at that visit we will obtain up-to-date three-view standing x-rays of the right knee and consider timing of a right total knee arthroplasty.    Discussed the continued importance of prophylactic dental antibiotics  All questions answered    This note was dictated using speech recognition software and was not corrected for spelling or grammatical errors

## 2024-01-08 ENCOUNTER — APPOINTMENT (OUTPATIENT)
Dept: PHYSICAL THERAPY | Facility: CLINIC | Age: 64
End: 2024-01-08
Payer: MEDICARE

## 2024-01-15 ENCOUNTER — CLINICAL SUPPORT (OUTPATIENT)
Dept: SLEEP MEDICINE | Facility: CLINIC | Age: 64
End: 2024-01-15
Payer: MEDICARE

## 2024-01-15 DIAGNOSIS — R06.81 WITNESSED APNEIC SPELLS: ICD-10-CM

## 2024-01-15 DIAGNOSIS — G47.33 OBSTRUCTIVE SLEEP APNEA (ADULT) (PEDIATRIC): ICD-10-CM

## 2024-01-15 PROCEDURE — 95806 SLEEP STUDY UNATT&RESP EFFT: CPT | Performed by: GENERAL PRACTICE

## 2024-01-15 NOTE — PROGRESS NOTES
Type of Study: HOME SLEEP STUDY - NOMAD     The patient received equipment and instructions for use of the USB [employer] Nomad HSAT device. The patient was instructed how to apply the effort belts, cannula, thermistor. It was also explained how the Nomad and oximeter components work.  The patient was asked to record their sleep for an 8-hour period.     The patient was informed of their responsibility for the device and acknowledged this by signing the HSAT device contract. The patient was asked to return the device on 01/16/2024 by 10 AM to the Respiratory Care Department at [facility].     The patient was instructed to call 911 as usual for any medical- emergencies while at home.  The patient was also given a phone number for troubleshooting when using the device in case there were additional questions.

## 2024-01-22 ENCOUNTER — OFFICE VISIT (OUTPATIENT)
Dept: PRIMARY CARE | Facility: CLINIC | Age: 64
End: 2024-01-22
Payer: MEDICARE

## 2024-01-22 VITALS
OXYGEN SATURATION: 94 % | HEIGHT: 66 IN | HEART RATE: 94 BPM | SYSTOLIC BLOOD PRESSURE: 113 MMHG | TEMPERATURE: 97.2 F | WEIGHT: 258.8 LBS | BODY MASS INDEX: 41.59 KG/M2 | DIASTOLIC BLOOD PRESSURE: 76 MMHG

## 2024-01-22 DIAGNOSIS — E66.01 OBESITY, CLASS III, BMI 40-49.9 (MORBID OBESITY) (MULTI): ICD-10-CM

## 2024-01-22 DIAGNOSIS — E78.2 MIXED HYPERLIPIDEMIA: ICD-10-CM

## 2024-01-22 DIAGNOSIS — F32.1 MAJOR DEPRESSIVE DISORDER, SINGLE EPISODE, MODERATE (MULTI): ICD-10-CM

## 2024-01-22 DIAGNOSIS — M16.10 ARTHRITIS OF HIP: ICD-10-CM

## 2024-01-22 DIAGNOSIS — R73.09 ABNORMAL GLUCOSE LEVEL: ICD-10-CM

## 2024-01-22 DIAGNOSIS — Z00.00 WELL ADULT EXAM: Primary | ICD-10-CM

## 2024-01-22 DIAGNOSIS — R91.1 LUNG NODULE: ICD-10-CM

## 2024-01-22 DIAGNOSIS — R93.89 ABNORMAL CXR: ICD-10-CM

## 2024-01-22 DIAGNOSIS — R06.81 APNEA: ICD-10-CM

## 2024-01-22 DIAGNOSIS — Z23 NEED FOR VACCINATION: ICD-10-CM

## 2024-01-22 DIAGNOSIS — M48.061 SPINAL STENOSIS OF LUMBAR REGION WITHOUT NEUROGENIC CLAUDICATION: ICD-10-CM

## 2024-01-22 DIAGNOSIS — Z23 ENCOUNTER FOR IMMUNIZATION: ICD-10-CM

## 2024-01-22 PROBLEM — R06.02 SHORTNESS OF BREATH: Status: RESOLVED | Noted: 2024-01-22 | Resolved: 2024-01-22

## 2024-01-22 PROBLEM — R06.83 SNORING: Status: ACTIVE | Noted: 2024-01-22

## 2024-01-22 PROBLEM — N39.0 UTI (URINARY TRACT INFECTION): Status: RESOLVED | Noted: 2023-12-08 | Resolved: 2024-01-22

## 2024-01-22 PROBLEM — M25.552 PAIN OF LEFT HIP JOINT: Status: RESOLVED | Noted: 2023-09-11 | Resolved: 2024-01-22

## 2024-01-22 PROBLEM — M43.06 SPONDYLOLYSIS, LUMBAR REGION: Status: ACTIVE | Noted: 2018-07-10

## 2024-01-22 PROBLEM — M79.643 PAIN OF HAND: Status: RESOLVED | Noted: 2024-01-22 | Resolved: 2024-01-22

## 2024-01-22 PROBLEM — M79.604 PAIN OF RIGHT LOWER EXTREMITY: Status: RESOLVED | Noted: 2018-06-05 | Resolved: 2024-01-22

## 2024-01-22 PROBLEM — R06.02 SHORTNESS OF BREATH: Status: ACTIVE | Noted: 2024-01-22

## 2024-01-22 PROBLEM — R05.3 CHRONIC COUGH: Status: ACTIVE | Noted: 2024-01-22

## 2024-01-22 PROCEDURE — 3008F BODY MASS INDEX DOCD: CPT | Performed by: FAMILY MEDICINE

## 2024-01-22 PROCEDURE — G0009 ADMIN PNEUMOCOCCAL VACCINE: HCPCS | Performed by: FAMILY MEDICINE

## 2024-01-22 PROCEDURE — 90677 PCV20 VACCINE IM: CPT | Performed by: FAMILY MEDICINE

## 2024-01-22 PROCEDURE — 1036F TOBACCO NON-USER: CPT | Performed by: FAMILY MEDICINE

## 2024-01-22 PROCEDURE — G0439 PPPS, SUBSEQ VISIT: HCPCS | Performed by: FAMILY MEDICINE

## 2024-01-22 RX ORDER — NALOXONE HYDROCHLORIDE 0.4 MG/ML
0.2 INJECTION, SOLUTION INTRAMUSCULAR; INTRAVENOUS; SUBCUTANEOUS AS NEEDED
COMMUNITY
Start: 2023-12-14 | End: 2024-05-21 | Stop reason: WASHOUT

## 2024-01-22 RX ORDER — FAMOTIDINE 20 MG/1
20 TABLET, FILM COATED ORAL DAILY
COMMUNITY
End: 2024-02-02 | Stop reason: SDUPTHER

## 2024-01-22 ASSESSMENT — ENCOUNTER SYMPTOMS
VOMITING: 0
POLYDIPSIA: 0
DIFFICULTY URINATING: 0
CHILLS: 0
JOINT SWELLING: 0
ARTHRALGIAS: 0
SLEEP DISTURBANCE: 1
HEADACHES: 0
APPETITE CHANGE: 0
SHORTNESS OF BREATH: 0
OCCASIONAL FEELINGS OF UNSTEADINESS: 1
ACTIVITY CHANGE: 1
DEPRESSION: 0
PALPITATIONS: 0
FEVER: 0
UNEXPECTED WEIGHT CHANGE: 0
DIARRHEA: 1
NAUSEA: 0
SEIZURES: 0
FATIGUE: 0
NERVOUS/ANXIOUS: 0
LOSS OF SENSATION IN FEET: 0
BLOOD IN STOOL: 0
WHEEZING: 0
CONFUSION: 0
DYSPHORIC MOOD: 0
TROUBLE SWALLOWING: 0
CONSTIPATION: 0
POLYPHAGIA: 0
COUGH: 0
ABDOMINAL PAIN: 0

## 2024-01-22 ASSESSMENT — ACTIVITIES OF DAILY LIVING (ADL)
TAKING_MEDICATION: INDEPENDENT
MANAGING_FINANCES: INDEPENDENT
BATHING: INDEPENDENT
DRESSING: INDEPENDENT
DOING_HOUSEWORK: INDEPENDENT
GROCERY_SHOPPING: INDEPENDENT

## 2024-01-22 NOTE — ASSESSMENT & PLAN NOTE
Doing great, on lexapro and Bupropion with prn buspirone. Moods much better now that pain better.

## 2024-01-22 NOTE — PATIENT INSTRUCTIONS
Have labs before visit in 3 months.     Limit carbs.     You had Prevnar 20 shot today. Next pneumonia shot will be after age 65.     Continue medications.     Schedule colonoscopy when able.

## 2024-01-22 NOTE — PROGRESS NOTES
Subjective   Patient ID: Elysia Zuniga is a 63 y.o. female who presents for Medicare Wellness.    Here for Medicare Wellness and overdue follow up on Chronic conditions, had surgery scheduled date of last appt. She is feeling much better now that had hip replacement. Much less pain.     Abnormal glucose - my labs were not done when blood drawn. A1C done by preop testing was 6.1. Has started creeping up for past couple years. Had mobility issues, Is watching diet.     Depression- on Lexapro 10, Bupropion 150 and Buspirone. Mood is excellent.     Recent hip replacement. 12/14/2023 Dr. De Jesus. PT 3 d per week now. Using walker.     Hyperlipidemia - repeat lab not done by lab, not on medication. Trying to become more active since surgery.     Abnormal CXR - has seen Dr. Gandhi who is managing. Reviewed note. Also saw here for possible sleep apnea. Had Home test a week ago.     Saw Cardiology in December for preop for hip replacement. She had echo done and was cleared. She has bundle branch block. Has no follow up with them.     Here for annual wellness exam. Last wellness over a year ago.     New concerns:no  Feels: well    Changes  to health/medications since last visit see above  Other providers seen since last visit Oksana De Jesus, Cardiology  Periods: not applicable. Menopause in 40s due to ablation for bleeding  Contraception: n/a  Healthy lifestyle habits: Regular exercise: getting better                                        Dietary habits: better than prior. Has lost weight on home scale                                        Social connections/relationships doing ok                                        Wears seatbelts Yes                                         Sunscreen Yes                                         Sexual Risk Factors No        Health screenings:  Pap smear: 10/25/2023 HPV neg repeat due 2028                                  Mammogram 10/3/2023                                   Self-breast Exam No                                   Colon screening ordered September, waited til after surgery                                  Dexa not applicable                                  Hep C screening ordered and not done                                  HIV screening declines                                  STI screeningnot applicable                          Health risks: Domestic Violence no                      Work-life balance On disability since 2018, was LPN                      Smoke detectors Yes                       Carbon monoxide detectors all electric                      Gun safety not applicable                      Second-hand Smoke No                       Occupational Risks no    Immunizations:  Influenza:  11/30/2023                            Tdap: overdue, will get at pharmacy                            HPV: not applicable                           Pneumonia: no                           Shingrix: no                           COVID: times 2, will get booster.                  Patient Care Team:  Xiomara Shrestha MD as PCP - General (Family Medicine)  Xiomara Shrestha MD as PCP [facility]  KYA Wilkerson-CNP as Nurse Practitioner (Cardiology)  Aaron Gandhi MD as Surgeon (Pulmonary Disease)  Natanael De Jesus MD as Consulting Physician (Orthopaedic Surgery)    Patient Active Problem List   Diagnosis    Lumbar spondylolysis    Mixed hyperlipidemia    Major depressive disorder, single episode, moderate (CMS/HCC)    Spinal stenosis of lumbar region without neurogenic claudication    Abnormal glucose level    Obesity, Class III, BMI 40-49.9 (morbid obesity) (CMS/HCC)    Abnormal CXR    Right bundle branch block    Arthritis of hip    Spondylolysis, lumbar region    Chronic cough    Apnea    Lung nodule    Snoring         Current Outpatient Medications:     ascorbic acid (Vitamin C) 1,000 mg tablet, Take 1 tablet (1,000 mg) by mouth once  daily., Disp: , Rfl:     aspirin 325 mg tablet, Take 1 tablet (325 mg) by mouth once daily., Disp: , Rfl:     buPROPion XL (Wellbutrin XL) 150 mg 24 hr tablet, Take 1 tablet (150 mg) by mouth once daily in the morning. Do not crush, chew, or split., Disp: 90 tablet, Rfl: 3    busPIRone (Buspar) 15 mg tablet, Take 1 tablet (15 mg) by mouth 3 times a day as needed (anxiety or stress)., Disp: 90 tablet, Rfl: 11    cetirizine (ZyrTEC) 10 mg tablet, 1 tablet (10 mg)., Disp: , Rfl:     diclofenac sodium (Voltaren) 1 % gel gel, Apply 1 Application topically 4 times a day as needed (pain)., Disp: 450 g, Rfl: 2    escitalopram (Lexapro) 10 mg tablet, Take 1 tablet (10 mg) by mouth once daily., Disp: 90 tablet, Rfl: 3    melatonin 10 mg tablet,chewable, Chew once daily at bedtime., Disp: , Rfl:     multivitamin tablet, Take 1 tablet by mouth once daily., Disp: , Rfl:     saccharomyces boulardii (Florastor) 250 mg capsule, Take 1 capsule (250 mg) by mouth 2 times a day., Disp: , Rfl:     famotidine (Pepcid) 20 mg tablet, Take 1 tablet (20 mg) by mouth once daily., Disp: , Rfl:     naloxone (Narcan) 0.4 mg/mL injection, Infuse 0.5 mL (0.2 mg) into a venous catheter if needed for opioid reversal or respiratory depression., Disp: , Rfl:     Past Medical, Surgical, Family, Social, and Substance Histories Reviewed.     Medicare Wellness Questionnaires and Histories in Nursing Notes Reviewed.     Review of Systems   Constitutional:  Positive for activity change. Negative for appetite change, chills, fatigue, fever and unexpected weight change.   HENT:  Negative for dental problem, hearing loss and trouble swallowing.    Eyes:  Negative for visual disturbance.   Respiratory:  Negative for cough, shortness of breath and wheezing.    Cardiovascular:  Negative for chest pain, palpitations and leg swelling.   Gastrointestinal:  Positive for diarrhea. Negative for abdominal pain, blood in stool, constipation, nausea and vomiting.         "Diarrhea after certain foods, almost immediately after one or 2 stools.      Endocrine: Negative for cold intolerance, heat intolerance, polydipsia, polyphagia and polyuria.   Genitourinary:  Negative for difficulty urinating and menstrual problem.        Incontinence worse at night. Started in 2017 or so. Never been treated. Is urge incontinence. Better since hip surgery. Sometimes loses control when stands up.   Has no issues with feeling the urge.    Musculoskeletal:  Negative for arthralgias and joint swelling.   Neurological:  Negative for seizures, syncope and headaches.   Psychiatric/Behavioral:  Positive for sleep disturbance. Negative for confusion and dysphoric mood. The patient is not nervous/anxious.         Feels like she does not rest. Awaiting sleep study ordered by pulmonary.        Objective   /76   Pulse 94   Temp 36.2 °C (97.2 °F)   Ht 1.676 m (5' 6\")   Wt 117 kg (258 lb 12.8 oz)   SpO2 94%   BMI 41.77 kg/m²     Physical Exam  Constitutional:       Appearance: She is obese.   HENT:      Head: Normocephalic and atraumatic.      Right Ear: Tympanic membrane normal.      Left Ear: Tympanic membrane normal.      Nose: Nose normal.      Mouth/Throat:      Pharynx: Oropharynx is clear.   Eyes:      Extraocular Movements: Extraocular movements intact.      Conjunctiva/sclera: Conjunctivae normal.      Pupils: Pupils are equal, round, and reactive to light.   Neck:      Vascular: No carotid bruit.   Cardiovascular:      Rate and Rhythm: Normal rate and regular rhythm.      Pulses: Normal pulses.      Heart sounds: No murmur heard.  Pulmonary:      Effort: Pulmonary effort is normal.      Breath sounds: Normal breath sounds.   Abdominal:      Palpations: There is no hepatomegaly or splenomegaly.   Musculoskeletal:         General: Normal range of motion.      Cervical back: Normal range of motion.      Left lower leg: No edema.   Skin:     General: Skin is warm and dry.      Findings: No rash. "   Neurological:      General: No focal deficit present.      Mental Status: She is alert. Mental status is at baseline.      Gait: Gait is intact.   Psychiatric:         Mood and Affect: Mood normal.         Thought Content: Thought content normal.       Recent Results (from the past 1680 hour(s))   Basic Metabolic Panel    Collection Time: 12/06/23 12:52 PM   Result Value Ref Range    Glucose 92 74 - 99 mg/dL    Sodium 136 136 - 145 mmol/L    Potassium 3.9 3.5 - 5.3 mmol/L    Chloride 106 98 - 107 mmol/L    Bicarbonate 21 21 - 32 mmol/L    Anion Gap 13 10 - 20 mmol/L    Urea Nitrogen 23 6 - 23 mg/dL    Creatinine 0.75 0.50 - 1.05 mg/dL    eGFR 90 >60 mL/min/1.73m*2    Calcium 9.3 8.6 - 10.3 mg/dL   CBC    Collection Time: 12/06/23 12:52 PM   Result Value Ref Range    WBC 7.3 4.4 - 11.3 x10*3/uL    nRBC 0.0 0.0 - 0.0 /100 WBCs    RBC 4.60 4.00 - 5.20 x10*6/uL    Hemoglobin 13.2 12.0 - 16.0 g/dL    Hematocrit 40.3 36.0 - 46.0 %    MCV 88 80 - 100 fL    MCH 28.7 26.0 - 34.0 pg    MCHC 32.8 32.0 - 36.0 g/dL    RDW 13.9 11.5 - 14.5 %    Platelets 377 150 - 450 x10*3/uL   Staphylococcus aureus/MRSA colonization, Culture    Collection Time: 12/06/23 12:52 PM    Specimen: Nares/Axilla/Groin; Swab   Result Value Ref Range    Staph/MRSA Screen Culture No Staphylococcus aureus isolated    Hemoglobin A1C    Collection Time: 12/06/23 12:52 PM   Result Value Ref Range    Hemoglobin A1C 6.1 (H) see below %    Estimated Average Glucose 128 Not Established mg/dL   Urinalysis with Reflex Microscopic and Culture    Collection Time: 12/06/23 12:52 PM   Result Value Ref Range    Color, Urine Nikki (N) Straw, Yellow    Appearance, Urine Hazy (N) Clear    Specific Gravity, Urine 1.028 1.005 - 1.035    pH, Urine 5.0 5.0, 5.5, 6.0, 6.5, 7.0, 7.5, 8.0    Protein, Urine 30 (1+) (N) NEGATIVE mg/dL    Glucose, Urine NEGATIVE NEGATIVE mg/dL    Blood, Urine NEGATIVE NEGATIVE    Ketones, Urine 5 (TRACE) (A) NEGATIVE mg/dL    Bilirubin, Urine  NEGATIVE NEGATIVE    Urobilinogen, Urine <2.0 <2.0 mg/dL    Nitrite, Urine NEGATIVE NEGATIVE    Leukocyte Esterase, Urine MODERATE (2+) (A) NEGATIVE   Microscopic Only, Urine    Collection Time: 12/06/23 12:52 PM   Result Value Ref Range    WBC, Urine >50 (A) 1-5, NONE /HPF    WBC Clumps, Urine OCCASIONAL Reference range not established. /HPF    RBC, Urine 3-5 NONE, 1-2, 3-5 /HPF    Squamous Epithelial Cells, Urine 26-50 (1+) Reference range not established. /HPF    Bacteria, Urine 4+ (A) NONE SEEN /HPF    Mucus, Urine 2+ Reference range not established. /LPF    Hyaline Casts, Urine 1+ (A) NONE /LPF   Urine Culture    Collection Time: 12/06/23 12:52 PM    Specimen: Clean Catch/Voided; Urine   Result Value Ref Range    Urine Culture >100,000 Escherichia coli (A)        Susceptibility    Escherichia coli - MICROSCAN     Ampicillin  Resistant      Amoxicillin/Clavulanate  Susceptible      Ampicillin/Sulbactam  Resistant      Cefazolin  Susceptible      Cefazolin (uncomplicated UTIs only)  Susceptible      Ciprofloxacin  Susceptible      Gentamicin  Susceptible      Nitrofurantoin  Susceptible      Piperacillin/Tazobactam  Susceptible      Trimethoprim/Sulfamethoxazole  Susceptible    ECG 12 Lead    Collection Time: 12/06/23  1:43 PM   Result Value Ref Range    Ventricular Rate 72 BPM    Atrial Rate 73 BPM    NJ Interval 150 ms    QRS Duration 154 ms    QT Interval 395 ms    QTC Calculation(Bazett) 433 ms    P Axis -14 degrees    R Axis 2 degrees    T Axis -34 degrees    QRS Count 12 beats    Q Onset 251 ms    T Offset 449 ms    QTC Fredericia 420 ms   Transthoracic Echo (TTE) Complete    Collection Time: 12/13/23  3:21 PM   Result Value Ref Range    LVOT diam 2.00     LV biplane EF 57     MV avg E/e' ratio 10.83     MV E/A ratio 0.80     LA vol index A/L 24.0     Tricuspid annular plane systolic excursion 2.7     RV free wall pk S' 15.20     LVIDd 5.40     LV A4C EF 55.3    Type And Screen    Collection Time: 12/14/23   6:30 AM   Result Value Ref Range    ABO TYPE A     Rh TYPE POS     ANTIBODY SCREEN NEG    CBC    Collection Time: 12/15/23  4:20 AM   Result Value Ref Range    WBC 11.8 (H) 4.4 - 11.3 x10*3/uL    nRBC 0.0 0.0 - 0.0 /100 WBCs    RBC 3.20 (L) 4.00 - 5.20 x10*6/uL    Hemoglobin 9.2 (L) 12.0 - 16.0 g/dL    Hematocrit 28.7 (L) 36.0 - 46.0 %    MCV 90 80 - 100 fL    MCH 28.8 26.0 - 34.0 pg    MCHC 32.1 32.0 - 36.0 g/dL    RDW 14.5 11.5 - 14.5 %    Platelets 250 150 - 450 x10*3/uL   VERIFY ABO/Rh Group Test    Collection Time: 12/15/23  4:20 AM   Result Value Ref Range    ABO TYPE A     Rh TYPE POS          Assessment/Plan   Problem List Items Addressed This Visit       Mixed hyperlipidemia     Will get lab drawn before next visit. . May need to consider medication, await results.          Relevant Orders    Follow Up In Primary Care - Established    Major depressive disorder, single episode, moderate (CMS/[facility])     Doing great, on lexapro and Bupropion with prn buspirone. Moods much better now that pain better.          Spinal stenosis of lumbar region without neurogenic claudication     She is planning to deal with this after knee addressed. Pain better since hip done.          Relevant Orders    Follow Up In Primary Care - Established    Abnormal glucose level     A1C is 6.1. She has increased activity since hip surgery and diet improved. Recheck in 3 months.          Relevant Orders    Follow Up In Primary Care - Established    Obesity, Class III, BMI 40-49.9 (morbid obesity) (CMS/[facility])     Is starting to lose weight now that can bear weight. Congratulated.          Relevant Orders    Follow Up In Primary Care - Established    Abnormal CXR     Managed by Dr. Gandhi         Arthritis of hip     S/p Left TKA, doing much better. Able to ambulate better.          Apnea     Awaiting results of home sleep apnea test.         Lung nodule     Dr. Gandhi following CT.           Other Visit Diagnoses       Well adult exam    -   Primary    Discussed health maintenance for age. Discussed immunizations needed, Increasing exercise.    Need for vaccination        Prevnar 20 today. Will get Tdap, flu, shingrix at pharmacy.    Encounter for immunization        Relevant Orders    Pneumococcal conjugate vaccine, 20-valent (PREVNAR 20)              Assessment, plans, tests, and follow up discussed with patient and patient verbalized understanding. Elysia was given an opportunity to ask questions and  any concerns were addressed including but not limited to meds, labs, follow up. .

## 2024-02-02 ENCOUNTER — OFFICE VISIT (OUTPATIENT)
Dept: PULMONOLOGY | Facility: HOSPITAL | Age: 64
End: 2024-02-02
Payer: MEDICARE

## 2024-02-02 VITALS
HEIGHT: 66 IN | RESPIRATION RATE: 16 BRPM | HEART RATE: 77 BPM | SYSTOLIC BLOOD PRESSURE: 102 MMHG | OXYGEN SATURATION: 95 % | BODY MASS INDEX: 41.54 KG/M2 | WEIGHT: 258.5 LBS | DIASTOLIC BLOOD PRESSURE: 70 MMHG | TEMPERATURE: 97.2 F

## 2024-02-02 DIAGNOSIS — R05.3 CHRONIC COUGH: ICD-10-CM

## 2024-02-02 DIAGNOSIS — K21.00 GASTROESOPHAGEAL REFLUX DISEASE WITH ESOPHAGITIS, UNSPECIFIED WHETHER HEMORRHAGE: ICD-10-CM

## 2024-02-02 DIAGNOSIS — J37.0 CHRONIC LARYNGITIS: ICD-10-CM

## 2024-02-02 DIAGNOSIS — G47.33 OSA (OBSTRUCTIVE SLEEP APNEA): Primary | ICD-10-CM

## 2024-02-02 DIAGNOSIS — J30.9 CHRONIC ALLERGIC RHINITIS: ICD-10-CM

## 2024-02-02 PROCEDURE — 3008F BODY MASS INDEX DOCD: CPT | Performed by: INTERNAL MEDICINE

## 2024-02-02 PROCEDURE — 99214 OFFICE O/P EST MOD 30 MIN: CPT | Performed by: INTERNAL MEDICINE

## 2024-02-02 PROCEDURE — 1036F TOBACCO NON-USER: CPT | Performed by: INTERNAL MEDICINE

## 2024-02-02 RX ORDER — FLUTICASONE PROPIONATE 50 MCG
2 SPRAY, SUSPENSION (ML) NASAL DAILY
Qty: 16 G | Refills: 6 | Status: SHIPPED | OUTPATIENT
Start: 2024-02-02 | End: 2024-07-31

## 2024-02-02 RX ORDER — PANTOPRAZOLE SODIUM 40 MG/1
40 TABLET, DELAYED RELEASE ORAL
Qty: 30 TABLET | Refills: 11 | Status: SHIPPED | OUTPATIENT
Start: 2024-02-02 | End: 2025-02-01

## 2024-02-02 RX ORDER — FAMOTIDINE 40 MG/1
40 TABLET, FILM COATED ORAL NIGHTLY
Qty: 90 TABLET | Refills: 3 | Status: SHIPPED | OUTPATIENT
Start: 2024-02-02 | End: 2025-02-01

## 2024-02-02 RX ORDER — MONTELUKAST SODIUM 10 MG/1
10 TABLET ORAL NIGHTLY
Qty: 30 TABLET | Refills: 11 | Status: SHIPPED | OUTPATIENT
Start: 2024-02-02 | End: 2025-02-01

## 2024-02-02 ASSESSMENT — PATIENT HEALTH QUESTIONNAIRE - PHQ9
SUM OF ALL RESPONSES TO PHQ9 QUESTIONS 1 AND 2: 0
1. LITTLE INTEREST OR PLEASURE IN DOING THINGS: NOT AT ALL
2. FEELING DOWN, DEPRESSED OR HOPELESS: NOT AT ALL

## 2024-02-02 ASSESSMENT — COLUMBIA-SUICIDE SEVERITY RATING SCALE - C-SSRS
1. IN THE PAST MONTH, HAVE YOU WISHED YOU WERE DEAD OR WISHED YOU COULD GO TO SLEEP AND NOT WAKE UP?: NO
2. HAVE YOU ACTUALLY HAD ANY THOUGHTS OF KILLING YOURSELF?: NO
6. HAVE YOU EVER DONE ANYTHING, STARTED TO DO ANYTHING, OR PREPARED TO DO ANYTHING TO END YOUR LIFE?: NO

## 2024-02-02 ASSESSMENT — ENCOUNTER SYMPTOMS
SHORTNESS OF BREATH: 1
FATIGUE: 1
COUGH: 1

## 2024-02-02 NOTE — PROGRESS NOTES
Subjective   Patient ID: Elysia Zuniga is a 63 y.o. female who presents for Sleeping Problem (Patient is here for a follow up visit. Patient admits to shortness of breath on exertion. Patient admits to both a dry and productive cough. Patient states she has had laryngitis for a couple months. Patient is not on any inhalers. Patient is not on a cpap or oxygen but she did have a HSAT and is awaiting results. Patient is not a smoker. Patient has no other concerns for today. ).  HPI  Elysia Zuniga is an 63 y.o. female never smoker, presenting for abnormal CXR findings which were not confirmed by CT.  CXR was don for pre-op evaluation for left hip replacement.  No h/o cardiac or pulmonary issues.  She reports frequent bronchitis in the past but that has not been a problem in the last few years.  She has been told that she snores and stops breathing at times when she sleep.  She is always tired and feels like she does not get good quality sleep.  She does have a chronic cough which is worse in the winter.  It does not bother her but she says sometimes it bothers other people.  It is usually worse in the evening which will come on for a few minute and then stops.  She does have allergic rhinitis.  She takes zertec for allergies which she takes every day.  She does have GERD symptoms and has been taking prilosec and famotidine at home.  She does reports some JORGENSEN with emptying groceries from her car for the past couple years but has been very limited by her hip pain.    Review of Systems   Constitutional:  Positive for fatigue.   Respiratory:  Positive for cough and shortness of breath.    All other systems reviewed and are negative.      Objective   Physical Exam  Vitals and nursing note reviewed.   Constitutional:       Appearance: She is obese.   HENT:      Head: Normocephalic.      Comments: Mallampati class IV     Nose: Nose normal.      Mouth/Throat:      Pharynx: Oropharynx is clear.   Eyes:      Extraocular  "Movements: Extraocular movements intact.      Conjunctiva/sclera: Conjunctivae normal.      Pupils: Pupils are equal, round, and reactive to light.   Cardiovascular:      Rate and Rhythm: Normal rate and regular rhythm.      Pulses: Normal pulses.      Heart sounds: Normal heart sounds.   Pulmonary:      Effort: Pulmonary effort is normal.      Breath sounds: Normal breath sounds.   Abdominal:      General: Bowel sounds are normal.      Palpations: Abdomen is soft.   Musculoskeletal:         General: Normal range of motion.      Cervical back: Normal range of motion.   Skin:     General: Skin is warm.   Neurological:      General: No focal deficit present.      Mental Status: She is alert and oriented to person, place, and time. Mental status is at baseline.   Psychiatric:         Mood and Affect: Mood normal.         Behavior: Behavior normal.         OBJECTIVE:    Vitals:    /70   Pulse 77   Temp 36.2 °C (97.2 °F)   Resp 16   Ht 1.676 m (5' 6\")   Wt 117 kg (258 lb 8 oz)   SpO2 95% Comment: RA  BMI 41.72 kg/m²     General Appearance:  Alert, cooperative, no distress or conversational dyspnea, appears stated age  Head:  Normocephalic, without obvious abnormality, atraumatic  Eyes:  PERRL, conjunctiva/corneas clear, EOM's intact  Nose:  Nares normal, septum midline, mucosa normal, no drainage or sinus tenderness  Mouth:  Lips, mucosa, and tongue normal; teeth and gums normal, no thrush  Neck:  Supple, symmetrical, trachea midline, no cervical, supraclavicular, and axillary adenopathy; no JVD, not using accessory muscles to breath  Lungs:    Clear to auscultation bilaterally, respirations unlabored, good air movement  Chest wall:  No tenderness or deformity, chest expands symmetrically  Heart:  Regular rate and rhythm, S1 and S2 normal, no murmur, rub or gallop  Abdomen:    Soft, non-tender, non-distended, bs active, no masses, no organomegaly  Extremities:  Extremities atraumatic, no edema, no cyanosis " or clubbing, 2+ pulses in all extremities  Skin:  Skin color, texture, turgor normal, no rashes or lesions  Neurologic:  CNII-XII intact. Normal strength, sensation      Data:    Labs reviewed in [facility]      Imaging:  Reviewed    CXR 2-view 12/6/23: Possible lingular cavitation with nonspecific thick-walled lesion.  Consider further evaluation with chest CT.      CT chest without IV contrast 12/8/23:   There is a 6 mm noncalcified subpleural nodule in the posterior left  upper lobe and some linear scarring or discoid atelectasis in the  right middle lobe and inferior left lingula but the lungs otherwise  are clear.  No lung cysts are appreciated.  There is a moderate  retrocardiac hiatal hernia.      ASSESSMENT  64yo never smoker with incidentally found 6mm subpleural ARLYN nodule, low risk patient     Malignancy risk by Kelvin carlin 2.4% (Low risk)    ESS 8/24, poor sleep quality, snoring and witness apnea- high risk for sleep apnea    Chronic cough likely from a combination of poorly controlled allergic rhinitis with post nasal drip and GERD        Assessment/Plan   WESLEY  Chronic cough with suspicion of bronchial asthma  Chronic allergic rhinitis  4.   GERD with hiatal hernia  5.   Solitarly pulmonary nodule 6 mm ARLYN    Recommendations:  -Discussed sleep study with patient in detail.  Severe sleep apnea and significant sleep-related hypoxia is noted.  I recommend patient to start PAP therapy.  Discussed alternative therapies which are not currently indicated because of the severity of sleep apnea.  We discussed weight management, side-lying position to avoid supine sleep apnea and management of chronic allergic rhinitis.  -repeat CT chest in 12 months ordered for 2024 per Dr. Claudio already  -home sleep study  -Optimize GERD treatment with pantoprazole in a.m. and famotidine at bedtime given significant hiatal hernia and cough related to laryngeal pharyngeal reflux  -Optimize allergic rhinitis as still has  significant postnasal drip and to continue montelukast, Flonase and antihistamine    Patient will start AutoPap therapy, complete PFTs, attempt weight loss and take treatment for GERD and allergic rhinitis.  If cough does not improve we will consider ICS/LABA for consideration for treatment as bronchial asthma.

## 2024-02-02 NOTE — PATIENT INSTRUCTIONS
Please avoid spicy food, caffeine and alcohol. Follow antireflux measures for hiatal hernia.  Take pantoprazole 40 mg 1 tablet daily before breakfast and Famotidine 40 mg 1 tablet  daily at bedtime.  Take Montelukast 1 tablet at bedtime and continue to take cetirizine daily in the morning.  Take fluticasone nasal spray 2 sprays in each nostril daily.  I have placed a referral to ENT for you.  I have ordered an Auto PAP for you to start treatment for severe WESLEY. Please contact with any questions.   I have ordered PFTs for you to complete.  I will see you back in 3 to 4 months. Please bring your APAP machine with you.

## 2024-02-04 ENCOUNTER — HOSPITAL ENCOUNTER (EMERGENCY)
Facility: HOSPITAL | Age: 64
Discharge: HOME | End: 2024-02-04
Attending: EMERGENCY MEDICINE
Payer: MEDICARE

## 2024-02-04 ENCOUNTER — HOSPITAL ENCOUNTER (EMERGENCY)
Facility: HOSPITAL | Age: 64
Discharge: HOME | End: 2024-02-04
Attending: STUDENT IN AN ORGANIZED HEALTH CARE EDUCATION/TRAINING PROGRAM
Payer: MEDICARE

## 2024-02-04 VITALS
WEIGHT: 258 LBS | SYSTOLIC BLOOD PRESSURE: 105 MMHG | OXYGEN SATURATION: 95 % | RESPIRATION RATE: 16 BRPM | HEART RATE: 84 BPM | HEIGHT: 66 IN | DIASTOLIC BLOOD PRESSURE: 70 MMHG | BODY MASS INDEX: 41.46 KG/M2 | TEMPERATURE: 97.8 F

## 2024-02-04 VITALS
RESPIRATION RATE: 17 BRPM | HEIGHT: 66 IN | HEART RATE: 94 BPM | OXYGEN SATURATION: 95 % | WEIGHT: 258 LBS | SYSTOLIC BLOOD PRESSURE: 132 MMHG | TEMPERATURE: 96.4 F | DIASTOLIC BLOOD PRESSURE: 90 MMHG | BODY MASS INDEX: 41.46 KG/M2

## 2024-02-04 DIAGNOSIS — I83.892 VARICOSE VEINS OF LEFT LOWER EXTREMITY WITH OTHER COMPLICATIONS: Primary | ICD-10-CM

## 2024-02-04 PROCEDURE — 99281 EMR DPT VST MAYX REQ PHY/QHP: CPT

## 2024-02-04 PROCEDURE — 2500000005 HC RX 250 GENERAL PHARMACY W/O HCPCS: Performed by: EMERGENCY MEDICINE

## 2024-02-04 PROCEDURE — 99283 EMERGENCY DEPT VISIT LOW MDM: CPT | Performed by: STUDENT IN AN ORGANIZED HEALTH CARE EDUCATION/TRAINING PROGRAM

## 2024-02-04 PROCEDURE — 99283 EMERGENCY DEPT VISIT LOW MDM: CPT | Mod: 27

## 2024-02-04 PROCEDURE — 12001 RPR S/N/AX/GEN/TRNK 2.5CM/<: CPT

## 2024-02-04 RX ADMIN — GELATIN ABSORBABLE SPONGE SIZE 100 1 EACH: MISC at 21:35

## 2024-02-04 ASSESSMENT — PAIN - FUNCTIONAL ASSESSMENT
PAIN_FUNCTIONAL_ASSESSMENT: 0-10
PAIN_FUNCTIONAL_ASSESSMENT: 0-10

## 2024-02-04 ASSESSMENT — COLUMBIA-SUICIDE SEVERITY RATING SCALE - C-SSRS
2. HAVE YOU ACTUALLY HAD ANY THOUGHTS OF KILLING YOURSELF?: NO
2. HAVE YOU ACTUALLY HAD ANY THOUGHTS OF KILLING YOURSELF?: NO
1. IN THE PAST MONTH, HAVE YOU WISHED YOU WERE DEAD OR WISHED YOU COULD GO TO SLEEP AND NOT WAKE UP?: NO
1. IN THE PAST MONTH, HAVE YOU WISHED YOU WERE DEAD OR WISHED YOU COULD GO TO SLEEP AND NOT WAKE UP?: NO
6. HAVE YOU EVER DONE ANYTHING, STARTED TO DO ANYTHING, OR PREPARED TO DO ANYTHING TO END YOUR LIFE?: NO
6. HAVE YOU EVER DONE ANYTHING, STARTED TO DO ANYTHING, OR PREPARED TO DO ANYTHING TO END YOUR LIFE?: NO

## 2024-02-04 ASSESSMENT — PAIN SCALES - GENERAL
PAINLEVEL_OUTOF10: 0 - NO PAIN
PAINLEVEL_OUTOF10: 1

## 2024-02-04 NOTE — ED PROVIDER NOTES
"    HPI   Chief Complaint   Patient presents with    Laceration     Laceration left knee       HPI: Patient is a 63-year-old female, history of left total hip replacement, history of anxiety and depression, not on anticoagulation, she is presenting to the emergency department today for left lower extremity bleeding.  Patient reports that she feels like she burst a varicose vein on her left leg.  She went to the urgent care where they told her \"we cannot do anything for you.\"  She was then brought here for further evaluation.  Left leg is wrapped currently.  She denies any trauma that she is aware of.  She denies any lightheadedness or dizziness, no shortness of breath, denies any paresthesias or weakness of her left lower extremity.      ROS: Complete 12 point review of systems performed, otherwise negative except as noted in the history of present illness    PMH: Reviewed, documented below in note. Pertinents in HPI  PSH: Reviewed and documented below in note. Pertinents in HPI  SH: No tobacco alcohol or illicits   Fam: Reviewed, noncontributory to patients current complaint  MEDS: Reviewed and documented below in note. Pertinents in HPI  ALLERGIES: Reviewed and documented below in note.        History provided by:  Patient   used: No                          [place] Coma Scale Score: 15                  Patient History   Past Medical History:   Diagnosis Date    Acute lumbar radiculopathy 08/16/2023    Arthritis     De Quervain's tenosynovitis 08/16/2023    Depression     GERD (gastroesophageal reflux disease)     Obesity, Class III, BMI 40-49.9 (morbid obesity) (CMS/[facility]) 11/30/2023    Pain of left hip joint 09/11/2023    Pain of right lower extremity 06/05/2018    Pre-op evaluation 11/30/2023    Shortness of breath 01/22/2024    Spinal stenosis of lumbar region without neurogenic claudication 08/16/2023     Past Surgical History:   Procedure Laterality Date    ENDOMETRIAL ABLATION      HIP " "ARTHROPLASTY Right 12/14/2023    NOSE SURGERY       Family History   Problem Relation Name Age of Onset    Hypertension Mother      Heart disease Mother      Prostate cancer Father      Heart disease Maternal Grandmother      Diabetes Maternal Grandmother      Asthma Maternal Grandfather      COPD Maternal Grandfather      Heart disease Paternal Grandfather      Breast cancer Neg Hx       Social History     Tobacco Use    Smoking status: Never    Smokeless tobacco: Never   Vaping Use    Vaping Use: Never used   Substance Use Topics    Alcohol use: Not Currently     Comment: once in a while    Drug use: Not Currently     Types: Marijuana     Comment: marijuana in the past for pain       Physical Exam   Visit Vitals  /90   Pulse 94   Temp 35.8 °C (96.4 °F)   Resp 17   Ht 1.676 m (5' 6\")   Wt 117 kg (258 lb)   SpO2 95%   BMI 41.64 kg/m²   OB Status Postmenopausal   Smoking Status Never   BSA 2.33 m²      Physical Exam  Vitals and nursing note reviewed.   Constitutional:       Appearance: Normal appearance.   HENT:      Head: Normocephalic and atraumatic.   Neck:      Vascular: No carotid bruit.   Cardiovascular:      Rate and Rhythm: Normal rate and regular rhythm.      Pulses: Normal pulses.      Heart sounds: Normal heart sounds.   Pulmonary:      Effort: Pulmonary effort is normal.      Breath sounds: Normal breath sounds.   Abdominal:      General: There is no distension.      Palpations: Abdomen is soft.      Tenderness: There is no abdominal tenderness. There is no guarding or rebound.   Musculoskeletal:         General: Signs of injury present. No tenderness or deformity.      Cervical back: Normal range of motion. No rigidity.   Skin:     General: Skin is warm and dry.      Capillary Refill: Capillary refill takes less than 2 seconds.      Comments: Examination of the bilateral lower extremities reveals varicose veins.  Her left lower extremity just distal to the knee is wrapped.  The wrapping is taken " down and she has an area of bleeding, under pressure, noted from one of the varicose veins on the anterior aspect, pinhole laceration.  Active bleeding is noted.   Neurological:      General: No focal deficit present.      Mental Status: She is alert and oriented to person, place, and time.      Sensory: No sensory deficit.      Motor: No weakness.   Psychiatric:         Mood and Affect: Mood normal.         Behavior: Behavior normal.         No orders to display       Labs Reviewed - No data to display      ED Course & MDM            Medical Decision Making  All mentioned lab results, ECGs, and imaging were independently reviewed by myself  - Patient evaluated. Patient is presenting to the emergency department for a bleeding varicose vein.  3 cc of lidocaine with epi were instilled without any resolution of the bleeding.  Figure-of-eight stitch was then applied by myself with resolution of the bleeding.  See procedure note for full details.  Patient was observed for 30 minutes without recurrence of bleeding.  She was able to ambulate without any recurrence of the bleeding.  Given this I feel she is stable for discharge.  Dressing was placed and the patient was discharged with instructions to get the suture removed in 7 to 10 days.  Strict return precautions were given and discussed    - Monitored for any changes in stability or symptomatology. Patient remained stable.   - Counseled regarding labs, imaging, diagnosis, and plan. Patient was agreeable. All questions were answered. The patient was receptive and agreeable to the plan of care.   -The patient was instructed to return to the emergency department if any symptoms recurred, worsened, or if there were any additional concerns.    *Disclaimer: This note was dictated by speech recognition. Minor errors in transcription may be present. Please call with questions.    Kilo King MD             Your medication list        ASK your doctor about these  medications        Instructions Last Dose Given Next Dose Due   aspirin 325 mg tablet           buPROPion  mg 24 hr tablet  Commonly known as: Wellbutrin XL      Take 1 tablet (150 mg) by mouth once daily in the morning. Do not crush, chew, or split.       busPIRone 15 mg tablet  Commonly known as: Buspar      Take 1 tablet (15 mg) by mouth 3 times a day as needed (anxiety or stress).       cetirizine 10 mg tablet  Commonly known as: ZyrTEC           diclofenac sodium 1 % gel  Commonly known as: Voltaren      Apply 1 Application topically 4 times a day as needed (pain).       escitalopram 10 mg tablet  Commonly known as: Lexapro      Take 1 tablet (10 mg) by mouth once daily.       famotidine 40 mg tablet  Commonly known as: Pepcid      Take 1 tablet (40 mg) by mouth once daily at bedtime.       fluticasone 50 mcg/actuation nasal spray  Commonly known as: Flonase      Administer 2 sprays into each nostril once daily. Shake gently. Before first use, prime pump. After use, clean tip and replace cap.       melatonin 10 mg tablet,chewable           montelukast 10 mg tablet  Commonly known as: Singulair      Take 1 tablet (10 mg) by mouth once daily at bedtime.       multivitamin tablet           naloxone 0.4 mg/mL injection  Commonly known as: Narcan           pantoprazole 40 mg EC tablet  Commonly known as: ProtoNix      Take 1 tablet (40 mg) by mouth once daily in the morning. Take before meals. Do not crush, chew, or split.       saccharomyces boulardii 250 mg capsule  Commonly known as: Florastor           Vitamin C 1,000 mg tablet  Generic drug: ascorbic acid                    Procedure  Laceration Repair    Performed by: Galen King MD  Authorized by: Galen King MD    Consent:     Consent obtained:  Verbal    Consent given by:  Patient  Anesthesia:     Anesthesia method:  Local infiltration    Local anesthetic:  Lidocaine 1% WITH epi  Laceration details:     Location:  Leg    Leg  location:  L lower leg    Wound length (cm): punctate.  Pre-procedure details:     Preparation:  Patient was prepped and draped in usual sterile fashion  Exploration:     Contaminated: no    Treatment:     Debridement:  None    Undermining:  None    Scar revision: no    Skin repair:     Repair method:  Sutures    Suture size:  6-0    Wound skin closure material used: ethilon.    Suture technique:  Figure eight    Number of sutures:  1  Approximation:     Approximation:  Close  Repair type:     Repair type:  Simple  Post-procedure details:     Dressing:  Non-adherent dressing    Procedure completion:  Tolerated well, no immediate complications       *This report was transcribed using voice recognition software.  Every effort was made to ensure accuracy; however, inadvertent computerized transcription errors may be present.*  Galen King MD  02/04/24         Galen King MD  02/04/24 9975

## 2024-02-04 NOTE — DISCHARGE INSTRUCTIONS
Get your suture removed in 7-10 days. Keep area covered with bandage until suture removed. Can shower and wash with soap and water daily. If recurrent bleeding occurs that does not resolve with pressure please return to the nearest emergency department.

## 2024-02-05 NOTE — ED PROVIDER NOTES
HPI   Chief Complaint   Patient presents with    Wound Check     Pt reportedly was here and had sutures put in where he varicose veins in her leg is. Pt reports that she thinks the suture opened up and she has blood at the site again       HPI     HISTORY OF PRESENT ILLNESS:  Patient started to have left anterior knee bleeding at 2.  Figure-of-eight stitch was applied by previous ED doctor.  Once she got home, she was taking down dressing and started to bleed again.  She came back for evaluation.  Bleeding is a pinhole.  She is on daily baby aspirin.  She denies feeling lightheaded.    Past Medical History: Anxiety, depression, history of varicose vein  Past Surgical History: Left hip replacement    __________________________________________________________  PHYSICAL EXAM:    Appearance: Alert, oriented , cooperative   Skin: Patient's left anterior knee had a small pinhole that was having slow venous oozing that was constant.  Easily controlled with pressure.  Stitches in place.  Intact,  dry skin, no lesions, rash, petechiae or purpura.   Eyes: PERRLA, EOMs intact,  Conjunctiva pink with no redness or exudates.    HENT: Normocephalic, atraumatic. Nares patent   Neck: Supple. Trachea at midline.   Pulmonary: Lung sounds are clear bilaterally.  There is no rales, rhonchi, or wheezing.  Cardiac: Regular rate and rhythm, no rubs, murmurs, or gallops. No JVD,   Abdomen: Abdomen is soft, nontender, and nondistended.  No palpable organomegaly.  No rebound or guarding.  No CVA tenderness. Nonsurgical abdomen  Genitourinary: Exam deferred.  Musculoskeletal: no edema, pain, cyanosis, or deformity in extremities. Pulses full and equal.   Neurological:  Cranial nerves are grossly intact, grossly normal sensation, no weakness, no focal findings identified.    __________________________________________________________  MEDICAL DECISION MAKING:    Patient was seen and examined.  This is his second ED visit for pain.  The  bleeding.  I did get report from the previous ED physician.  He had placed a figure-of-eight stitch.  The patient currently is only bleeding from his tiny little pinhole.  I initially applied pressure and Gelfoam.  This did not stop the bleeding.  Second figure-of-eight stitch was placed.  This successfully stopped the bleeding.  Patient was observed an hour after the figure-of-eight stitch was placed.  Wound was then dressed.  Patient follow-up primary care physician.  First-aid instructions given.  Patient given return precautions.  Vital signs were within normal limits I do not believe that patient is hemorrhage enough to require labs.    External Records Reviewed: I reviewed recent and relevant outside records including: Prior ED physician note from earlier today    Artis Stoner  Emergency Medicine               [place] Coma Scale Score: 15                  Patient History   Past Medical History:   Diagnosis Date    Acute lumbar radiculopathy 08/16/2023    Arthritis     De Quervain's tenosynovitis 08/16/2023    Depression     GERD (gastroesophageal reflux disease)     Obesity, Class III, BMI 40-49.9 (morbid obesity) (CMS/[facility]) 11/30/2023    Pain of left hip joint 09/11/2023    Pain of right lower extremity 06/05/2018    Pre-op evaluation 11/30/2023    Shortness of breath 01/22/2024    Spinal stenosis of lumbar region without neurogenic claudication 08/16/2023     Past Surgical History:   Procedure Laterality Date    ENDOMETRIAL ABLATION      HIP ARTHROPLASTY Right 12/14/2023    NOSE SURGERY       Family History   Problem Relation Name Age of Onset    Hypertension Mother      Heart disease Mother      Prostate cancer Father      Heart disease Maternal Grandmother      Diabetes Maternal Grandmother      Asthma Maternal Grandfather      COPD Maternal Grandfather      Heart disease Paternal Grandfather      Breast cancer Neg Hx       Social History     Tobacco Use    Smoking status: Never    Smokeless tobacco: Never    Vaping Use    Vaping Use: Never used   Substance Use Topics    Alcohol use: Not Currently     Comment: once in a while    Drug use: Not Currently     Types: Marijuana     Comment: marijuana in the past for pain       Physical Exam   ED Triage Vitals [02/04/24 2109]   Temperature Heart Rate Respirations BP   36.6 °C (97.8 °F) 84 16 105/70      Pulse Ox Temp Source Heart Rate Source Patient Position   95 % Temporal Monitor --      BP Location FiO2 (%)     -- --       Physical Exam    ED Course & MDM   Diagnoses as of 02/05/24 2240   Varicose veins of left lower extremity with other complications       Medical Decision Making      Procedure  Laceration Repair    Performed by: Artis Stoner DO  Authorized by: Artis Stoner DO    Consent:     Consent obtained:  Verbal    Alternatives discussed:  No treatment  Universal protocol:     Patient identity confirmed:  Verbally with patient, arm band and provided demographic data  Anesthesia:     Anesthesia method:  Local infiltration    Local anesthetic:  Lidocaine 1% WITH epi  Laceration details:     Location: Patient had a small pinhole in between the 2 figure-of-eight stitches that were placed that was bleeding.  Exploration:     Hemostasis achieved with:  Direct pressure  Skin repair:     Repair method:  Sutures    Suture size:  4-0    Suture material:  Nylon    Suture technique: Figure of 8 stitch.    Number of sutures:  1  Approximation:     Approximation:  Close  Repair type:     Repair type:  Simple  Post-procedure details:     Dressing:  Sterile dressing    Procedure completion:  Tolerated       Artis Stoner DO  02/05/24 4380

## 2024-02-15 ENCOUNTER — OFFICE VISIT (OUTPATIENT)
Dept: PRIMARY CARE | Facility: CLINIC | Age: 64
End: 2024-02-15
Payer: MEDICARE

## 2024-02-15 VITALS
TEMPERATURE: 97 F | RESPIRATION RATE: 16 BRPM | DIASTOLIC BLOOD PRESSURE: 69 MMHG | OXYGEN SATURATION: 95 % | BODY MASS INDEX: 42.65 KG/M2 | HEART RATE: 76 BPM | SYSTOLIC BLOOD PRESSURE: 100 MMHG | WEIGHT: 265.4 LBS | HEIGHT: 66 IN

## 2024-02-15 DIAGNOSIS — Z23 NEED FOR VACCINATION: ICD-10-CM

## 2024-02-15 DIAGNOSIS — I83.893 SYMPTOMATIC VARICOSE VEINS, BILATERAL: ICD-10-CM

## 2024-02-15 DIAGNOSIS — Z48.02 ENCOUNTER FOR REMOVAL OF SUTURES: ICD-10-CM

## 2024-02-15 DIAGNOSIS — I83.892 VARICOSE VEINS OF LOWER EXTREMITIES WITH COMPLICATIONS, LEFT: Primary | ICD-10-CM

## 2024-02-15 PROBLEM — G47.33 OBSTRUCTIVE SLEEP APNEA SYNDROME: Status: ACTIVE | Noted: 2024-02-15

## 2024-02-15 PROBLEM — J30.9 ALLERGIC RHINITIS: Status: ACTIVE | Noted: 2024-02-15

## 2024-02-15 PROBLEM — K21.00 GASTROESOPHAGEAL REFLUX DISEASE WITH ESOPHAGITIS: Status: ACTIVE | Noted: 2024-02-15

## 2024-02-15 PROBLEM — J37.0 CHRONIC LARYNGITIS: Status: ACTIVE | Noted: 2024-02-15

## 2024-02-15 PROCEDURE — 90471 IMMUNIZATION ADMIN: CPT | Performed by: FAMILY MEDICINE

## 2024-02-15 PROCEDURE — 1036F TOBACCO NON-USER: CPT | Performed by: FAMILY MEDICINE

## 2024-02-15 PROCEDURE — 3008F BODY MASS INDEX DOCD: CPT | Performed by: FAMILY MEDICINE

## 2024-02-15 PROCEDURE — 99213 OFFICE O/P EST LOW 20 MIN: CPT | Performed by: FAMILY MEDICINE

## 2024-02-15 PROCEDURE — 90715 TDAP VACCINE 7 YRS/> IM: CPT | Performed by: FAMILY MEDICINE

## 2024-02-15 ASSESSMENT — ANXIETY QUESTIONNAIRES
3. WORRYING TOO MUCH ABOUT DIFFERENT THINGS: NOT AT ALL
7. FEELING AFRAID AS IF SOMETHING AWFUL MIGHT HAPPEN: NOT AT ALL
4. TROUBLE RELAXING: SEVERAL DAYS
GAD7 TOTAL SCORE: 1
IF YOU CHECKED OFF ANY PROBLEMS ON THIS QUESTIONNAIRE, HOW DIFFICULT HAVE THESE PROBLEMS MADE IT FOR YOU TO DO YOUR WORK, TAKE CARE OF THINGS AT HOME, OR GET ALONG WITH OTHER PEOPLE: SOMEWHAT DIFFICULT
1. FEELING NERVOUS, ANXIOUS, OR ON EDGE: NOT AT ALL
5. BEING SO RESTLESS THAT IT IS HARD TO SIT STILL: NOT AT ALL
2. NOT BEING ABLE TO STOP OR CONTROL WORRYING: NOT AT ALL
6. BECOMING EASILY ANNOYED OR IRRITABLE: NOT AT ALL

## 2024-02-15 ASSESSMENT — ENCOUNTER SYMPTOMS
LOSS OF SENSATION IN FEET: 0
OCCASIONAL FEELINGS OF UNSTEADINESS: 1
DEPRESSION: 0

## 2024-02-15 NOTE — PROGRESS NOTES
Subjective   Patient ID: Elysia Zuniga is a 63 y.o. female who presents for Follow-up (Follow up , ED on 2/4/2024 with stitch removal ).    Got up to walk on Feb 4. Varicose vein was pouring out blood. Stitches, Bled again. Needs sutures removed. No injury.     She would like her varicose veins evaluated.            Current Outpatient Medications:     ascorbic acid (Vitamin C) 1,000 mg tablet, Take 1 tablet (1,000 mg) by mouth once daily., Disp: , Rfl:     aspirin 325 mg tablet, Take 1 tablet (325 mg) by mouth once daily., Disp: , Rfl:     buPROPion XL (Wellbutrin XL) 150 mg 24 hr tablet, Take 1 tablet (150 mg) by mouth once daily in the morning. Do not crush, chew, or split., Disp: 90 tablet, Rfl: 3    busPIRone (Buspar) 15 mg tablet, Take 1 tablet (15 mg) by mouth 3 times a day as needed (anxiety or stress)., Disp: 90 tablet, Rfl: 11    cetirizine (ZyrTEC) 10 mg tablet, 1 tablet (10 mg)., Disp: , Rfl:     diclofenac sodium (Voltaren) 1 % gel gel, Apply 1 Application topically 4 times a day as needed (pain)., Disp: 450 g, Rfl: 2    escitalopram (Lexapro) 10 mg tablet, Take 1 tablet (10 mg) by mouth once daily., Disp: 90 tablet, Rfl: 3    famotidine (Pepcid) 40 mg tablet, Take 1 tablet (40 mg) by mouth once daily at bedtime., Disp: 90 tablet, Rfl: 3    fluticasone (Flonase) 50 mcg/actuation nasal spray, Administer 2 sprays into each nostril once daily. Shake gently. Before first use, prime pump. After use, clean tip and replace cap., Disp: 16 g, Rfl: 6    melatonin 10 mg tablet,chewable, Chew once daily at bedtime., Disp: , Rfl:     montelukast (Singulair) 10 mg tablet, Take 1 tablet (10 mg) by mouth once daily at bedtime., Disp: 30 tablet, Rfl: 11    multivitamin tablet, Take 1 tablet by mouth once daily., Disp: , Rfl:     pantoprazole (ProtoNix) 40 mg EC tablet, Take 1 tablet (40 mg) by mouth once daily in the morning. Take before meals. Do not crush, chew, or split., Disp: 30 tablet, Rfl: 11    naloxone  "(Narcan) 0.4 mg/mL injection, Infuse 0.5 mL (0.2 mg) into a venous catheter if needed for opioid reversal or respiratory depression., Disp: , Rfl:     saccharomyces boulardii (Florastor) 250 mg capsule, Take 1 capsule (250 mg) by mouth 2 times a day., Disp: , Rfl:     Patient Active Problem List   Diagnosis    Lumbar spondylolysis    Mixed hyperlipidemia    Major depressive disorder, single episode, moderate (CMS/HCC)    Spinal stenosis of lumbar region without neurogenic claudication    Abnormal glucose level    Obesity, Class III, BMI 40-49.9 (morbid obesity) (CMS/HCC)    Abnormal CXR    Right bundle branch block    Arthritis of hip    Spondylolysis, lumbar region    Chronic cough    Apnea    Lung nodule    Snoring    Chronic laryngitis    Gastroesophageal reflux disease with esophagitis    Allergic rhinitis    Obstructive sleep apnea syndrome    Symptomatic varicose veins, bilateral         Review of Systems    Objective   /69 (BP Location: Left arm, Patient Position: Sitting, BP Cuff Size: Large adult)   Pulse 76   Temp 36.1 °C (97 °F) (Temporal)   Resp 16   Ht 1.676 m (5' 6\")   Wt 120 kg (265 lb 6.4 oz)   SpO2 95%   BMI 42.84 kg/m²     Physical Exam  Vitals reviewed.   Constitutional:       Appearance: Normal appearance.   Pulmonary:      Effort: Pulmonary effort is normal.   Skin:     Comments: Small wound right shin with varicosity underneath.   Large varicosities both legs.    Neurological:      Mental Status: She is alert.   Psychiatric:         Mood and Affect: Mood normal.         Behavior: Behavior normal.         Assessment/Plan   Problem List Items Addressed This Visit       Symptomatic varicose veins, bilateral     Referred to vascular. Copious prominent varicose veins legs, Ruptured one below left patella. Healed with sutures.          Relevant Orders    Referral to Vascular Surgery     Other Visit Diagnoses       Varicose veins of lower extremities with complications, left    -  Primary "    SPontaneous rupture varicose vein left leg. Sutured in ER. She is doing well. Due for stitch removal today.    Encounter for removal of sutures        2 sutures removed. One was Figure of eight. No bleeding. Well healed wound. No complcations.    Need for vaccination        Tdap given.    Relevant Orders    Tdap vaccine, age 7 years and older  (BOOSTRIX)              Assessment, plans, tests, and follow up discussed with patient and patient verbalized understanding. Eylsia was given an opportunity to ask questions and  any concerns were addressed including but not limited to care of area, management of varicose veins..

## 2024-02-15 NOTE — ASSESSMENT & PLAN NOTE
Referred to vascular. Copious prominent varicose veins legs, Ruptured one below left patella. Healed with sutures.

## 2024-02-23 ENCOUNTER — OFFICE VISIT (OUTPATIENT)
Dept: VASCULAR SURGERY | Facility: CLINIC | Age: 64
End: 2024-02-23
Payer: MEDICARE

## 2024-02-23 VITALS
WEIGHT: 264 LBS | SYSTOLIC BLOOD PRESSURE: 118 MMHG | DIASTOLIC BLOOD PRESSURE: 83 MMHG | BODY MASS INDEX: 42.61 KG/M2 | HEART RATE: 80 BPM

## 2024-02-23 DIAGNOSIS — I83.893 SYMPTOMATIC VARICOSE VEINS, BILATERAL: ICD-10-CM

## 2024-02-23 DIAGNOSIS — M79.89 LEG SWELLING: Primary | ICD-10-CM

## 2024-02-23 PROCEDURE — 3008F BODY MASS INDEX DOCD: CPT | Performed by: INTERNAL MEDICINE

## 2024-02-23 PROCEDURE — 1036F TOBACCO NON-USER: CPT | Performed by: INTERNAL MEDICINE

## 2024-02-23 PROCEDURE — 99202 OFFICE O/P NEW SF 15 MIN: CPT | Performed by: INTERNAL MEDICINE

## 2024-02-23 ASSESSMENT — ENCOUNTER SYMPTOMS
CONSTITUTIONAL NEGATIVE: 1
EYES NEGATIVE: 1
ENDOCRINE NEGATIVE: 1
NEUROLOGICAL NEGATIVE: 1
MUSCULOSKELETAL NEGATIVE: 1
HEMATOLOGIC/LYMPHATIC NEGATIVE: 1
GASTROINTESTINAL NEGATIVE: 1
ALLERGIC/IMMUNOLOGIC NEGATIVE: 1
RESPIRATORY NEGATIVE: 1
PSYCHIATRIC NEGATIVE: 1

## 2024-02-23 NOTE — PROGRESS NOTES
Subjective   Patient ID: Elysia Zuniga is a 63 y.o. female who presents for New Patient Visit (Symptomatic varicose veins /ruptured).  HPI  63 year old female with a past medical history of HLD, obesity, GERD, spinal stenosis, sleep apnea and left hip surgery that presents to the office for varicose veins. She had went to the ED on 2/4/24 for a bleeding varicose vein on left knee area. They had placed sutures- removed per PCP. Overall, she does complain of leg leg achiness and swelling. She does wear compression stockings with no relief of symptoms. She denies any previous history of a DVT.   This is the first time, she has had a bleeding varicose vein. No further episodes noted. No vascular studies performed. She is a retired LPN     Upon exam, she has a cluster of spider veins on her left leg- on her thigh and near her knee. Does have some leg swelling. She has palpable pedal pulses. No open sores or wounds noted. She denies leg claudication          Review of Systems   Constitutional: Negative.    HENT: Negative.     Eyes: Negative.    Respiratory: Negative.     Cardiovascular:  Positive for leg swelling.   Gastrointestinal: Negative.    Endocrine: Negative.    Genitourinary: Negative.    Musculoskeletal: Negative.    Skin: Negative.    Allergic/Immunologic: Negative.    Neurological: Negative.    Hematological: Negative.    Psychiatric/Behavioral: Negative.         Objective   Physical Exam  Constitutional:       Appearance: She is obese.   Eyes:      Pupils: Pupils are equal, round, and reactive to light.   Cardiovascular:      Rate and Rhythm: Normal rate.   Pulmonary:      Effort: Pulmonary effort is normal.   Abdominal:      General: Bowel sounds are normal.   Musculoskeletal:         General: Swelling present.      Cervical back: Normal range of motion.   Skin:     General: Skin is warm and dry.      Capillary Refill: Capillary refill takes less than 2 seconds.   Neurological:      General: No focal  deficit present.      Mental Status: She is alert.   Psychiatric:         Mood and Affect: Mood normal.         Assessment/Plan   63 year old female with a past medical history of HLD, obesity, GERD, spinal stenosis, sleep apnea and left hip surgery that presents to the office for varicose veins.     Plan:  Will order a venous insufficiency study for the left leg  Follow up with Dr. Jackson after testing  Continue conservative measures such as compression stockings, rest and elevation in the interim  Call office if you develop worsening leg pain, swelling or develop a new wound/ulcer            AMIE Gill 02/23/24 10:38 AM

## 2024-02-26 ENCOUNTER — HOSPITAL ENCOUNTER (OUTPATIENT)
Dept: RESPIRATORY THERAPY | Facility: HOSPITAL | Age: 64
Discharge: HOME | End: 2024-02-26
Payer: MEDICARE

## 2024-02-26 DIAGNOSIS — R05.3 CHRONIC COUGH: ICD-10-CM

## 2024-02-26 LAB
MGC ASCENT PFT - FEV1 - POST: 2.38
MGC ASCENT PFT - FEV1 - PRE: 2.38
MGC ASCENT PFT - FEV1 - PREDICTED: 2.47
MGC ASCENT PFT - FVC - POST: 2.8
MGC ASCENT PFT - FVC - PRE: 2.94
MGC ASCENT PFT - FVC - PREDICTED: 3.14

## 2024-02-26 PROCEDURE — 94729 DIFFUSING CAPACITY: CPT | Performed by: INTERNAL MEDICINE

## 2024-02-26 PROCEDURE — 94640 AIRWAY INHALATION TREATMENT: CPT

## 2024-02-26 PROCEDURE — 2500000002 HC RX 250 W HCPCS SELF ADMINISTERED DRUGS (ALT 637 FOR MEDICARE OP, ALT 636 FOR OP/ED): Performed by: INTERNAL MEDICINE

## 2024-02-26 PROCEDURE — 94726 PLETHYSMOGRAPHY LUNG VOLUMES: CPT

## 2024-02-26 PROCEDURE — 94726 PLETHYSMOGRAPHY LUNG VOLUMES: CPT | Performed by: INTERNAL MEDICINE

## 2024-02-26 PROCEDURE — 94060 EVALUATION OF WHEEZING: CPT | Performed by: INTERNAL MEDICINE

## 2024-02-26 RX ORDER — ALBUTEROL SULFATE 90 UG/1
4 AEROSOL, METERED RESPIRATORY (INHALATION) ONCE
Status: COMPLETED | OUTPATIENT
Start: 2024-02-26 | End: 2024-02-26

## 2024-02-26 RX ADMIN — ALBUTEROL SULFATE 4 PUFF: 90 AEROSOL, METERED RESPIRATORY (INHALATION) at 15:52

## 2024-02-28 ENCOUNTER — TELEPHONE (OUTPATIENT)
Dept: PULMONOLOGY | Facility: HOSPITAL | Age: 64
End: 2024-02-28
Payer: MEDICARE

## 2024-02-28 NOTE — TELEPHONE ENCOUNTER
Patient called in and I spoke with her regarding her testing results. Recommended she continue current treatment. Instructed patient to call us back with any further questions or concerns. Patient was agreeable to treatment plan and acknowledged understanding.   ----- Message from Aaron Gandhi MD sent at 2/27/2024  1:06 PM EST -----  Please advise patient that lung functions are normal. Continue current treatment for her chronic cough and continue Auto PAP therapy.

## 2024-03-01 DIAGNOSIS — G89.29 CHRONIC PAIN OF RIGHT KNEE: ICD-10-CM

## 2024-03-01 DIAGNOSIS — M25.561 CHRONIC PAIN OF RIGHT KNEE: ICD-10-CM

## 2024-03-01 DIAGNOSIS — M25.552 PAIN, JOINT, HIP, LEFT: ICD-10-CM

## 2024-03-07 ENCOUNTER — OFFICE VISIT (OUTPATIENT)
Dept: ORTHOPEDIC SURGERY | Facility: CLINIC | Age: 64
End: 2024-03-07
Payer: MEDICARE

## 2024-03-07 ENCOUNTER — HOSPITAL ENCOUNTER (OUTPATIENT)
Dept: RADIOLOGY | Facility: CLINIC | Age: 64
Discharge: HOME | End: 2024-03-07
Payer: MEDICARE

## 2024-03-07 VITALS — WEIGHT: 262 LBS | BODY MASS INDEX: 42.11 KG/M2 | HEIGHT: 66 IN

## 2024-03-07 DIAGNOSIS — M17.11 PRIMARY OSTEOARTHRITIS OF RIGHT KNEE: ICD-10-CM

## 2024-03-07 DIAGNOSIS — M25.561 CHRONIC PAIN OF RIGHT KNEE: ICD-10-CM

## 2024-03-07 DIAGNOSIS — G89.29 CHRONIC PAIN OF RIGHT KNEE: ICD-10-CM

## 2024-03-07 DIAGNOSIS — M16.10 ARTHRITIS OF HIP: Primary | ICD-10-CM

## 2024-03-07 PROCEDURE — 99214 OFFICE O/P EST MOD 30 MIN: CPT | Performed by: SPECIALIST

## 2024-03-07 PROCEDURE — 3008F BODY MASS INDEX DOCD: CPT | Performed by: SPECIALIST

## 2024-03-07 PROCEDURE — 1036F TOBACCO NON-USER: CPT | Performed by: SPECIALIST

## 2024-03-07 PROCEDURE — 73562 X-RAY EXAM OF KNEE 3: CPT | Mod: RT

## 2024-03-07 NOTE — PROGRESS NOTES
Follow up Left CANDI 12/4/23, doing great  and  follow up right knee pain. She uses cane to help assist her walk. Due to her right knee feeling unsteady.  Discuss right knee replacement  Xr right knee today no constitutional symptoms.    Exam: Left hip with good passive and active range of motion 5 out of 5 strength.  Well-healed incision no signs of swelling.  Distal neurovascular status intact negative Homans.    Right knee in varus with mild pseudovalgus instability.  Full extension flexion to 130 with pain and crepitus.  Dermis intact neurovascular intact no effusion.    Three-view standing x-rays of the right knee show severe osteoarthritis of the knee with varus and mild instability in stance.    Assessment plan: 3-month status post left CANDI doing excellent.  Severe right knee osteoarthritis with varus instability is failed multiple conservative treatments.  She is considering a right total knee arthroplasty but probably not till the end of summer.  She will likely call during the summer to schedule the surgery over the phone.  We did see her back for preop visit.  This would be an uncemented PS triathlon.

## 2024-03-08 ENCOUNTER — HOSPITAL ENCOUNTER (OUTPATIENT)
Dept: VASCULAR MEDICINE | Facility: HOSPITAL | Age: 64
Discharge: HOME | End: 2024-03-08
Payer: MEDICARE

## 2024-03-08 DIAGNOSIS — M79.89 LEG SWELLING: ICD-10-CM

## 2024-03-08 PROCEDURE — 93971 EXTREMITY STUDY: CPT | Performed by: INTERNAL MEDICINE

## 2024-03-08 PROCEDURE — 93971 EXTREMITY STUDY: CPT

## 2024-03-11 ENCOUNTER — TELEPHONE (OUTPATIENT)
Dept: VASCULAR SURGERY | Facility: CLINIC | Age: 64
End: 2024-03-11
Payer: MEDICARE

## 2024-03-11 NOTE — TELEPHONE ENCOUNTER
Result Communication    Resulted Orders   Vascular US lower extremity venous insufficiency left    [facility]  [address] [id number]       Phone [phone] Fax [phone]       Vascular Lab Report     VASC US LOWER EXTREMITY VENOUS INSUFFICIENCY LEFT    Patient Name:      LEE SALMERON     Reading Physician:  [id number] Matteo Decker MD, RPVI  Study Date:        3/8/2024             Ordering Provider:  [id number] HAYDEE GAUTAM  MRN/PID:           [id number]             Fellow:  Accession#:        [id number]         Technologist:       Alisha Benavides                                                              RVT  Date of Birth/Age: 1960 / 63 years Technologist 2:  Gender:            F                    Encounter#:         [id number]  Admission Status:  Outpatient           Location Performed: [facility]       Diagnosis/ICD:    Other specified soft tissue disorders-M79.89  CPT Codes:        04614 Venous reflux study VV VI Limited  Patient Position: Study performed in a reverse Trendelenburg position.       CONCLUSIONS:  Left Lower Venous Insufficiency: There is reflux noted in the common femoral vein. No evidence of venous insufficiency involving the left lower extremity superficial venous system. The left small saphenous vein arises proximal to the saphenopopliteal junction.  Left Lower Venous: No evidence of acute deep vein thrombus visualized in the left lower extremity.     Imaging & Doppler Findings:     Left           Compress Thrombus  SFJ              Yes      None  Prox Thigh GSV   Yes      None  Mid Thigh GSV    Yes      None  Knee GSV         Yes      None  Prox Calf GSV    Yes      None  Mid Calf GSV     Yes      None  Dist Calf GSV    Yes      None  SPJ              Yes      None  SSV Prox         Yes      None  SSV Mid          Yes      None  SSV Distal       Yes       None       Left                  Compress Thrombus        Flow  Distal External Iliac            None  CFV                     Yes      None         Reflux  PFV                     Yes      None  FV Proximal             Yes      None   Spontaneous/Phasic  FV Mid                  Yes      None  FV Distal               Yes      None  Popliteal               Yes      None   Spontaneous/Phasic  Peroneal                Yes      None  PTV                     Yes      None       [id number] Matteo Decker MD, CURT  Electronically signed by [id number] Matteo Decker MD, CURT on 3/11/2024 at 9:11:34 AM         ** Final **         9:33 AM      Results were not successfully communicated with the patient and they did not acknowledge their understanding.    Pt has an upcoming appt with Dr. Jackson to discuss results   No evidence of a DVT. She has an some deep venous reflux

## 2024-03-26 ENCOUNTER — OFFICE VISIT (OUTPATIENT)
Dept: VASCULAR SURGERY | Facility: CLINIC | Age: 64
End: 2024-03-26
Payer: MEDICARE

## 2024-03-26 VITALS
HEART RATE: 86 BPM | BODY MASS INDEX: 43.26 KG/M2 | WEIGHT: 268 LBS | SYSTOLIC BLOOD PRESSURE: 137 MMHG | DIASTOLIC BLOOD PRESSURE: 82 MMHG

## 2024-03-26 DIAGNOSIS — I83.893 SYMPTOMATIC VARICOSE VEINS, BILATERAL: Primary | ICD-10-CM

## 2024-03-26 PROCEDURE — 3008F BODY MASS INDEX DOCD: CPT | Performed by: SURGERY

## 2024-03-26 PROCEDURE — 99213 OFFICE O/P EST LOW 20 MIN: CPT | Performed by: SURGERY

## 2024-03-26 PROCEDURE — 1036F TOBACCO NON-USER: CPT | Performed by: SURGERY

## 2024-03-26 NOTE — PROGRESS NOTES
Subjective   Patient ID: Elysia Zuniga is a 63 y.o. female who presents for Follow-up (Results ).  HPI    Patient follow-up secondary to a left pretibial reticular varicosity bleeding  At this time no bleeding noted  No pain or discomfort has diffuse small reticular veins in bilateral extremities left slightly worse than right  Ambulatory without significant swelling noted both lower extremities  Does not claudicate no active ulcers noted.    Review of Systems  Review of Systems    Constitutional:  no generalized malaise overall feels well, energy levels intact, no complaints specifically noted  HEENT:  No blurry vision, no visual aides noted, no hearing loss no ear ache no nose bleeds noted, no dysphagia, no congestion otherwise no pertinent positives noted  Cardiovascular:  no palpitations, chest pain or heaviness noted, no leg swelling, no numbness or tingling in the lower extremity noted  Respiratory:  no shortness of breath, no productive cough noted, no conversation dyspnea or difficulty breathing  Gastrointestinal:  no abdominal pain, no nausea or vomiting, appetite intact, no bowel irregularities noted  Genitourinary:   no urinary incontinence, frequency or urgency issues noted, no hematuria or burning sensation issues  Musculoskeletal:  No muscle aches or pains, no joint discomfort noted, no back pain noted otherwise feels well  Skin: no ulcerations, skin color issues or wounds upper or lower extremities  Neurologic: no dizziness, no hemiplegia, no hemiparesis, no obvious visual deficits noted  Psychiatric: no depression, no memory loss noted, no suicidal ideation  Endocrine: no weight loss or gain, no temperature concerns hot or cold intolerance  Hemogolotic/Lymphatic: no bruising, excessive bleeding, no swelling in the groins or neck noted      Objective   Physical Exam  Physical exam    Constitutional: alert and in no acute distress verbal  Eyes: No erythema swelling or discharge noted  Neck: supple,  symmetric, trachea midline, no masses noted  Cardiovascular: Carotid pulses 2+, no obvious bruit, no Jugular distension noted, no thrill, heart regular rate, lower extremity vascular exam intact, cap refill <2 sec  Pulmonary:  Bilateral breath sounds intact, clear with rales rhonchi or wheeze  Abdomen: soft non tender, no pulsatile masses noted, no rebound rigidity or guarding noted  Skin: intact warm no abnormal turgor  Psychiatric: alert without any obvious cognitive issues, oriented to person, place, and time      Assessment/Plan   Bleeding varicosity left lower extremity at this time resolved  We did briefly discuss possible injection sclerotherapy for reticular veins  At this point she wants to hold off  Compression if new bleeding starts  Venous duplex demonstrates no significant superficial venous insufficiency  I advised her compression as tolerated  Follow-up with me as needed  She will let me know if she wants to proceed with injection sclerotherapy.  We did discuss the possibility of skin necrosis as well as recurrence she does understand.         Kings Jackson DO 03/26/24 3:21 PM

## 2024-04-18 ENCOUNTER — LAB (OUTPATIENT)
Dept: LAB | Facility: LAB | Age: 64
End: 2024-04-18
Payer: MEDICARE

## 2024-04-18 DIAGNOSIS — Z13.1 SCREENING FOR DIABETES MELLITUS: ICD-10-CM

## 2024-04-18 DIAGNOSIS — Z13.220 SCREENING, LIPID: ICD-10-CM

## 2024-04-18 DIAGNOSIS — R73.09 OTHER ABNORMAL GLUCOSE: ICD-10-CM

## 2024-04-18 DIAGNOSIS — Z11.59 ENCOUNTER FOR HEPATITIS C SCREENING TEST FOR LOW RISK PATIENT: ICD-10-CM

## 2024-04-18 DIAGNOSIS — E66.09 OBESITY DUE TO EXCESS CALORIES WITHOUT SERIOUS COMORBIDITY, UNSPECIFIED CLASSIFICATION: ICD-10-CM

## 2024-04-18 LAB
ALBUMIN SERPL BCP-MCNC: 4.1 G/DL (ref 3.4–5)
ALP SERPL-CCNC: 91 U/L (ref 33–136)
ALT SERPL W P-5'-P-CCNC: 9 U/L (ref 7–45)
ANION GAP SERPL CALC-SCNC: 13 MMOL/L (ref 10–20)
AST SERPL W P-5'-P-CCNC: 13 U/L (ref 9–39)
BILIRUB SERPL-MCNC: 0.5 MG/DL (ref 0–1.2)
BUN SERPL-MCNC: 20 MG/DL (ref 6–23)
CALCIUM SERPL-MCNC: 9.1 MG/DL (ref 8.6–10.3)
CHLORIDE SERPL-SCNC: 105 MMOL/L (ref 98–107)
CHOLEST SERPL-MCNC: 248 MG/DL (ref 0–199)
CHOLESTEROL/HDL RATIO: 5.7
CO2 SERPL-SCNC: 27 MMOL/L (ref 21–32)
CREAT SERPL-MCNC: 0.73 MG/DL (ref 0.5–1.05)
EGFRCR SERPLBLD CKD-EPI 2021: >90 ML/MIN/1.73M*2
GLUCOSE SERPL-MCNC: 93 MG/DL (ref 74–99)
HDLC SERPL-MCNC: 43.8 MG/DL
LDLC SERPL CALC-MCNC: 150 MG/DL
NON HDL CHOLESTEROL: 204 MG/DL (ref 0–149)
POTASSIUM SERPL-SCNC: 4.1 MMOL/L (ref 3.5–5.3)
PROT SERPL-MCNC: 7.1 G/DL (ref 6.4–8.2)
SODIUM SERPL-SCNC: 141 MMOL/L (ref 136–145)
TRIGL SERPL-MCNC: 272 MG/DL (ref 0–149)
VLDL: 54 MG/DL (ref 0–40)

## 2024-04-18 PROCEDURE — 36415 COLL VENOUS BLD VENIPUNCTURE: CPT

## 2024-04-18 PROCEDURE — 83036 HEMOGLOBIN GLYCOSYLATED A1C: CPT

## 2024-04-18 PROCEDURE — 86803 HEPATITIS C AB TEST: CPT

## 2024-04-18 PROCEDURE — 80053 COMPREHEN METABOLIC PANEL: CPT

## 2024-04-18 PROCEDURE — 80061 LIPID PANEL: CPT

## 2024-04-19 LAB
EST. AVERAGE GLUCOSE BLD GHB EST-MCNC: 148 MG/DL
HBA1C MFR BLD: 6.8 %
HCV AB SER QL: NONREACTIVE

## 2024-04-22 ENCOUNTER — OFFICE VISIT (OUTPATIENT)
Dept: PRIMARY CARE | Facility: CLINIC | Age: 64
End: 2024-04-22
Payer: MEDICARE

## 2024-04-22 VITALS
TEMPERATURE: 96.8 F | WEIGHT: 268.2 LBS | OXYGEN SATURATION: 93 % | HEART RATE: 83 BPM | SYSTOLIC BLOOD PRESSURE: 108 MMHG | RESPIRATION RATE: 16 BRPM | DIASTOLIC BLOOD PRESSURE: 69 MMHG | HEIGHT: 66 IN | BODY MASS INDEX: 43.1 KG/M2

## 2024-04-22 DIAGNOSIS — E78.2 MIXED HYPERLIPIDEMIA: ICD-10-CM

## 2024-04-22 DIAGNOSIS — G47.33 OBSTRUCTIVE SLEEP APNEA SYNDROME: Primary | ICD-10-CM

## 2024-04-22 DIAGNOSIS — M48.061 SPINAL STENOSIS OF LUMBAR REGION WITHOUT NEUROGENIC CLAUDICATION: ICD-10-CM

## 2024-04-22 DIAGNOSIS — E11.9 TYPE 2 DIABETES MELLITUS WITHOUT COMPLICATION, WITHOUT LONG-TERM CURRENT USE OF INSULIN (MULTI): ICD-10-CM

## 2024-04-22 DIAGNOSIS — E66.01 OBESITY, CLASS III, BMI 40-49.9 (MORBID OBESITY) (MULTI): ICD-10-CM

## 2024-04-22 PROCEDURE — 1036F TOBACCO NON-USER: CPT | Performed by: FAMILY MEDICINE

## 2024-04-22 PROCEDURE — 99215 OFFICE O/P EST HI 40 MIN: CPT | Performed by: FAMILY MEDICINE

## 2024-04-22 PROCEDURE — 3078F DIAST BP <80 MM HG: CPT | Performed by: FAMILY MEDICINE

## 2024-04-22 PROCEDURE — 3074F SYST BP LT 130 MM HG: CPT | Performed by: FAMILY MEDICINE

## 2024-04-22 PROCEDURE — 3044F HG A1C LEVEL LT 7.0%: CPT | Performed by: FAMILY MEDICINE

## 2024-04-22 PROCEDURE — 3050F LDL-C >= 130 MG/DL: CPT | Performed by: FAMILY MEDICINE

## 2024-04-22 PROCEDURE — 3008F BODY MASS INDEX DOCD: CPT | Performed by: FAMILY MEDICINE

## 2024-04-22 RX ORDER — METFORMIN HYDROCHLORIDE 500 MG/1
500 TABLET, EXTENDED RELEASE ORAL
Qty: 100 TABLET | Refills: 3 | Status: SHIPPED | OUTPATIENT
Start: 2024-04-22 | End: 2025-05-27

## 2024-04-22 RX ORDER — ATORVASTATIN CALCIUM 20 MG/1
20 TABLET, FILM COATED ORAL DAILY
Qty: 100 TABLET | Refills: 3 | Status: SHIPPED | OUTPATIENT
Start: 2024-04-22 | End: 2025-05-27

## 2024-04-22 ASSESSMENT — ENCOUNTER SYMPTOMS
OCCASIONAL FEELINGS OF UNSTEADINESS: 0
LOSS OF SENSATION IN FEET: 0
POLYPHAGIA: 1
HEADACHES: 0
POLYDIPSIA: 0
UNEXPECTED WEIGHT CHANGE: 0
PALPITATIONS: 0
DEPRESSION: 0
COUGH: 0
SHORTNESS OF BREATH: 0
CONFUSION: 0

## 2024-04-22 NOTE — ASSESSMENT & PLAN NOTE
New diagnosis. Discussed role of weight loss, exercise, limiting carbs. Discussed prevention of complications, medications available, goals of therapy. Will start Metformin 500 mg bid - start at 1 daily. Then up to 2 daily. Start Rybelsus  3 mg daily. Follow up in 3 months. Will consult Clinical Pharmacy to help with medications. Will discuss eye exam, monofilament, microalbumin next visit.

## 2024-04-22 NOTE — ASSESSMENT & PLAN NOTE
BMI 43.29 and not budged. Has a lot of limitations with orthopedic issues and caregiver issues. She is really concerned that now is in diabetes range. Will start medication. Follow up in 3 months.

## 2024-04-22 NOTE — PROGRESS NOTES
Subjective   Patient ID: Elysia Zuniga is a 63 y.o. female who presents for Follow-up (3 month follow up).    Here for 3 month follow up.     Cholesterol - not on medication. Lab done.     Anxiety - doing well on Bupropion. Buspirone, Escitalopram. Doing well    Right knee is likely to get surgery in September. She is excieted to get there.     Prediabetes - A1C up to 6.8. Not sure that she has been on Metformin for sugar. Thinks maybe. She is working on diet but is horrible. Trying to increase Fresh foods. Fast foods, prepared foods are too much of the diet.     Dr. Gandhi managing asthma meds. Doing well. Also got CPAP for [place] and feeling better.     Had Left hip replacement in Dec with Dr. De Jesus. Doing well. Able to walk with cane.                Current Outpatient Medications:     ascorbic acid (Vitamin C) 1,000 mg tablet, Take 1 tablet (1,000 mg) by mouth once daily., Disp: , Rfl:     aspirin 325 mg tablet, Take 1 tablet (325 mg) by mouth once daily., Disp: , Rfl:     buPROPion XL (Wellbutrin XL) 150 mg 24 hr tablet, Take 1 tablet (150 mg) by mouth once daily in the morning. Do not crush, chew, or split., Disp: 90 tablet, Rfl: 3    busPIRone (Buspar) 15 mg tablet, Take 1 tablet (15 mg) by mouth 3 times a day as needed (anxiety or stress)., Disp: 90 tablet, Rfl: 11    cetirizine (ZyrTEC) 10 mg tablet, 1 tablet (10 mg)., Disp: , Rfl:     diclofenac sodium (Voltaren) 1 % gel gel, Apply 1 Application topically 4 times a day as needed (pain)., Disp: 450 g, Rfl: 2    escitalopram (Lexapro) 10 mg tablet, Take 1 tablet (10 mg) by mouth once daily., Disp: 90 tablet, Rfl: 3    famotidine (Pepcid) 40 mg tablet, Take 1 tablet (40 mg) by mouth once daily at bedtime., Disp: 90 tablet, Rfl: 3    fluticasone (Flonase) 50 mcg/actuation nasal spray, Administer 2 sprays into each nostril once daily. Shake gently. Before first use, prime pump. After use, clean tip and replace cap., Disp: 16 g, Rfl: 6    melatonin 10 mg  tablet,chewable, Chew once daily at bedtime., Disp: , Rfl:     montelukast (Singulair) 10 mg tablet, Take 1 tablet (10 mg) by mouth once daily at bedtime., Disp: 30 tablet, Rfl: 11    multivitamin tablet, Take 1 tablet by mouth once daily., Disp: , Rfl:     pantoprazole (ProtoNix) 40 mg EC tablet, Take 1 tablet (40 mg) by mouth once daily in the morning. Take before meals. Do not crush, chew, or split., Disp: 30 tablet, Rfl: 11    saccharomyces boulardii (Florastor) 250 mg capsule, Take 1 capsule (250 mg) by mouth 2 times a day., Disp: , Rfl:     atorvastatin (Lipitor) 20 mg tablet, Take 1 tablet (20 mg) by mouth once daily., Disp: 100 tablet, Rfl: 3    metFORMIN XR (Glucophage-XR) 500 mg 24 hr tablet, Take 1 tablet (500 mg) by mouth once daily with breakfast. Do not crush, chew, or split., Disp: 100 tablet, Rfl: 3    naloxone (Narcan) 0.4 mg/mL injection, Infuse 0.5 mL (0.2 mg) into a venous catheter if needed for opioid reversal or respiratory depression., Disp: , Rfl:     semaglutide (Rybelsus) 3 mg tablet, Take 1 tablet (3 mg) by mouth once daily., Disp: 30 tablet, Rfl: 1    Patient Active Problem List   Diagnosis    Lumbar spondylolysis    Mixed hyperlipidemia    Major depressive disorder, single episode, moderate (Multi)    Spinal stenosis of lumbar region without neurogenic claudication    Type 2 diabetes mellitus without complication, without long-term current use of insulin (Multi)    Obesity, Class III, BMI 40-49.9 (morbid obesity) (Multi)    Abnormal CXR    Right bundle branch block    Arthritis of hip    Spondylolysis, lumbar region    Chronic cough    Apnea    Lung nodule    Snoring    Chronic laryngitis    Gastroesophageal reflux disease with esophagitis    Allergic rhinitis    Obstructive sleep apnea syndrome    Symptomatic varicose veins, bilateral         Review of Systems   Constitutional:  Negative for unexpected weight change.   Respiratory:  Negative for cough and shortness of breath.   "  Cardiovascular:  Negative for chest pain, palpitations and leg swelling.   Endocrine: Positive for polyphagia. Negative for cold intolerance, heat intolerance, polydipsia and polyuria.   Skin:  Negative for rash.   Neurological:  Negative for headaches.   Psychiatric/Behavioral:  Negative for confusion.        Objective   /69 (BP Location: Right arm, Patient Position: Sitting, BP Cuff Size: Large adult)   Pulse 83   Temp 36 °C (96.8 °F) (Temporal)   Resp 16   Ht 1.676 m (5' 6\")   Wt 122 kg (268 lb 3.2 oz)   SpO2 93%   BMI 43.29 kg/m²     Physical Exam  Vitals reviewed.   Constitutional:       Appearance: Normal appearance.   Pulmonary:      Effort: Pulmonary effort is normal.   Neurological:      Mental Status: She is alert.   Psychiatric:         Mood and Affect: Mood normal.         Behavior: Behavior normal.         Assessment/Plan   Problem List Items Addressed This Visit       Mixed hyperlipidemia     Now that she is diabeteic, goals are lower. LDL stil 150, TG down a lot but 272, HDL up to 43. Has been exercising more. Will start statin, Atorvastatin 20 mg daily in the evening and recheck 3 months.          Relevant Medications    semaglutide (Rybelsus) 3 mg tablet    atorvastatin (Lipitor) 20 mg tablet    Other Relevant Orders    Follow Up In Primary Care - Established    Referral to Clinical Pharmacy    Spinal stenosis of lumbar region without neurogenic claudication     Has been told not able to be made better. She is working with the other joints to improve function.          Type 2 diabetes mellitus without complication, without long-term current use of insulin (Multi)      New diagnosis. Discussed role of weight loss, exercise, limiting carbs. Discussed prevention of complications, medications available, goals of therapy. Will start Metformin 500 mg bid - start at 1 daily. Then up to 2 daily. Start Rybelsus  3 mg daily. Follow up in 3 months. Will consult Clinical Pharmacy to help with " medications. Will discuss eye exam, monofilament, microalbumin next visit.          Relevant Medications    metFORMIN XR (Glucophage-XR) 500 mg 24 hr tablet    semaglutide (Rybelsus) 3 mg tablet    Other Relevant Orders    Follow Up In Primary Care - Established    Referral to Clinical Pharmacy    Obesity, Class III, BMI 40-49.9 (morbid obesity) (Multi)     BMI 43.29 and not budged. Has a lot of limitations with orthopedic issues and caregiver issues. She is really concerned that now is in diabetes range. Will start medication. Follow up in 3 months.          Relevant Orders    Follow Up In Primary Care - Established    Referral to Clinical Pharmacy    Obstructive sleep apnea syndrome - Primary     Started on CPAP by Dr. Gandhi. Likes it a lot. Feels better in daytime.               Assessment, plans, tests, and follow up discussed with patient and patient verbalized understanding. Elysia was given an opportunity to ask questions and  any concerns were addressed including but not limited to new diagnosis of diabetes, medications. Follow up, pharmacy referral. .

## 2024-04-22 NOTE — ASSESSMENT & PLAN NOTE
Has been told not able to be made better. She is working with the other joints to improve function.

## 2024-04-22 NOTE — ASSESSMENT & PLAN NOTE
Now that she is diabeteic, goals are lower. LDL stil 150, TG down a lot but 272, HDL up to 43. Has been exercising more. Will start statin, Atorvastatin 20 mg daily in the evening and recheck 3 months.

## 2024-05-21 ENCOUNTER — TELEMEDICINE (OUTPATIENT)
Dept: PHARMACY | Facility: HOSPITAL | Age: 64
End: 2024-05-21
Payer: MEDICARE

## 2024-05-21 ENCOUNTER — SPECIALTY PHARMACY (OUTPATIENT)
Dept: PHARMACY | Facility: CLINIC | Age: 64
End: 2024-05-21

## 2024-05-21 DIAGNOSIS — E66.01 OBESITY, CLASS III, BMI 40-49.9 (MORBID OBESITY) (MULTI): ICD-10-CM

## 2024-05-21 DIAGNOSIS — E11.9 TYPE 2 DIABETES MELLITUS WITHOUT COMPLICATION, WITHOUT LONG-TERM CURRENT USE OF INSULIN (MULTI): ICD-10-CM

## 2024-05-21 DIAGNOSIS — E78.2 MIXED HYPERLIPIDEMIA: ICD-10-CM

## 2024-05-21 PROCEDURE — RXMED WILLOW AMBULATORY MEDICATION CHARGE

## 2024-05-21 RX ORDER — IBUPROFEN 200 MG
CAPSULE ORAL
Qty: 100 EACH | Refills: 3 | Status: SHIPPED | OUTPATIENT
Start: 2024-05-21 | End: 2024-05-22 | Stop reason: ALTCHOICE

## 2024-05-21 RX ORDER — INSULIN PUMP SYRINGE, 3 ML
EACH MISCELLANEOUS
Qty: 1 EACH | Refills: 0 | Status: SHIPPED | OUTPATIENT
Start: 2024-05-21 | End: 2024-05-22 | Stop reason: ALTCHOICE

## 2024-05-21 RX ORDER — LANCETS 28 GAUGE
EACH MISCELLANEOUS
Qty: 100 EACH | Refills: 3 | Status: SHIPPED | OUTPATIENT
Start: 2024-05-21

## 2024-05-21 NOTE — ASSESSMENT & PLAN NOTE
Patient's lipid panel is now considered uncontrolled, given her diagnosis of diabetes. She was recently started on atorvastatin. She is doing well with medications. No concerns at this time. Lipid panel can be re-tested in 3 months to assess efficacy.    Plan:  CONTINUE atorvastatin 20 mg daily

## 2024-05-21 NOTE — PROGRESS NOTES
Patient ID: Elysia Zuniga is a 63 y.o. female who presents for Diabetes.    Referring Provider: Xiomara Shrestha, *  PCP: Xiomara Shrestha MD   Last visit with PCP: 4/22/2024 Next visit with PCP: 5/22/2024      Subjective     HPI  Diabetes  Current  Pharmacotherapy:   Metformin  mg daily      SECONDARY PREVENTION  - Statin? Atorvastatin 20 mg  - ACE-I/ARB? No   - Aspirin? Yes    -The 10-year ASCVD risk score (Andrew DIAZ, et al., 2019) is: 8%    Values used to calculate the score:      Age: 63 years      Sex: Female      Is Non- : No      Diabetic: Yes      Tobacco smoker: No      Systolic Blood Pressure: 108 mmHg      Is BP treated: No      HDL Cholesterol: 43.8 mg/dL      Total Cholesterol: 248 mg/dL      Current monitoring regimen:   Patient is using: none     SMBG Fasting Readings: none      Patient Assistance Screening (VAF)    Patient verbally reports monthly or yearly income which is less than 400% federal poverty level     Application for program has been submitted for the following medications: Rybelsus    Patient has been informed that program team will be reaching out to them to discuss necessary documentation, instructed to answer phone/return voicemail.     Patient aware this process may take up to 6 weeks.     If approved medication must be filled through [facility] pharmacy and may be picked up or mailed to patient.       Review of Systems      Objective     There were no vitals taken for this visit.   BP Readings from Last 4 Encounters:   04/22/24 108/69   03/26/24 137/82   02/23/24 118/83   02/15/24 100/69      There were no vitals filed for this visit.     Labs  Lab Results   Component Value Date    BILITOT 0.5 04/18/2024    CALCIUM 9.1 04/18/2024    CO2 27 04/18/2024     04/18/2024    CREATININE 0.73 04/18/2024    GLUCOSE 93 04/18/2024    ALKPHOS 91 04/18/2024    K 4.1 04/18/2024    PROT 7.1 04/18/2024     04/18/2024    AST 13 04/18/2024     ALT 9 04/18/2024    BUN 20 04/18/2024    ANIONGAP 13 04/18/2024    ALBUMIN 4.1 04/18/2024    GFRF >90 04/27/2022     Lab Results   Component Value Date    TRIG 272 (H) 04/18/2024    CHOL 248 (H) 04/18/2024    LDLCALC 150 (H) 04/18/2024    HDL 43.8 04/18/2024     Lab Results   Component Value Date    HGBA1C 6.8 (H) 04/18/2024       Current Outpatient Medications   Medication Instructions    ascorbic acid (VITAMIN C) 1,000 mg, oral, Daily    aspirin 325 mg, oral, Daily    atorvastatin (LIPITOR) 20 mg, oral, Daily    blood sugar diagnostic (Blood Glucose Test) strip Use to monitor blood sugars 3 times weekly in the morning, before breakfast    blood-glucose meter misc Use to test blood sugars as directed    buPROPion XL (WELLBUTRIN XL) 150 mg, oral, Every morning, Do not crush, chew, or split.    busPIRone (BUSPAR) 15 mg, oral, 3 times daily PRN    cetirizine (ZYRTEC) 10 mg    diclofenac sodium (Voltaren) 1 % gel gel 1 Application, Topical, 4 times daily PRN    escitalopram (LEXAPRO) 10 mg, oral, Daily    famotidine (PEPCID) 40 mg, oral, Nightly    fluticasone (Flonase) 50 mcg/actuation nasal spray 2 sprays, Each Nostril, Daily, Shake gently. Before first use, prime pump. After use, clean tip and replace cap.    lancets misc Use to monitor blood sugars 3 times weekly in the morning, before breakfast    melatonin 10 mg tablet,chewable oral, Nightly    metFORMIN XR (GLUCOPHAGE-XR) 500 mg, oral, Daily with breakfast, Do not crush, chew, or split.    montelukast (SINGULAIR) 10 mg, oral, Nightly    multivitamin tablet 1 tablet, oral, Daily    pantoprazole (PROTONIX) 40 mg, oral, Daily before breakfast, Do not crush, chew, or split.    saccharomyces boulardii (FLORASTOR) 250 mg, oral, 2 times daily    semaglutide (RYBELSUS) 3 mg, oral, Daily         Drug Interactions;  None at time of review    Assessment/Plan   Problem List Items Addressed This Visit       Mixed hyperlipidemia     Patient's lipid panel is now considered  uncontrolled, given her diagnosis of diabetes. She was recently started on atorvastatin. She is doing well with medications. No concerns at this time. Lipid panel can be re-tested in 3 months to assess efficacy.    Plan:  CONTINUE atorvastatin 20 mg daily          Relevant Medications    semaglutide (Rybelsus) 3 mg tablet    Type 2 diabetes mellitus without complication, without long-term current use of insulin (Multi)     Patient is a newly diagnosed diabetic. Her most recent A1c is at goal of <7%. She is not currently testing her blood pressures at home. She trying to be more careful watching what she eats (avoiding fast food, increasing veggies, eating sweets in moderations). We discuss testing a few times weekly to monitor sugars as medications are adjusted.     Patient started on metformin XR with no problems or concerns. She has not been able to start Rybelsus at this point due to cost. We discuss  Patient Assistance and patient verbally confirms she should qualify.     Plan:  START Rybelsus 3 mg weekly  Counseled patient on MOA, expectations, side effects, duration of therapy, contraindications, administration techniques, and monitoring parameters  Answered all other questions and concerns  Rx to  [facility] for PAP  CONTINUE metformin  mg daily         Relevant Medications    semaglutide (Rybelsus) 3 mg tablet    blood sugar diagnostic (Blood Glucose Test) strip    blood-glucose meter misc    lancets misc    Obesity, Class III, BMI 40-49.9 (morbid obesity) (Multi)       Follow-up: 6/11 @ 1:30 PM     Time spent with pt: Total length of time 20 (minutes) of the encounter and more than 50% was spent counseling the patient.    [place]and[place] Linda, PharmD    Continue all meds under the continuation of care with the referring provider and clinical pharmacy team.

## 2024-05-21 NOTE — ASSESSMENT & PLAN NOTE
Patient is a newly diagnosed diabetic. Her most recent A1c is at goal of <7%. She is not currently testing her blood pressures at home. She trying to be more careful watching what she eats (avoiding fast food, increasing veggies, eating sweets in moderations). We discuss testing a few times weekly to monitor sugars as medications are adjusted.     Patient started on metformin XR with no problems or concerns. She has not been able to start Rybelsus at this point due to cost. We discuss  Patient Assistance and patient verbally confirms she should qualify.     Plan:  START Rybelsus 3 mg weekly  Counseled patient on MOA, expectations, side effects, duration of therapy, contraindications, administration techniques, and monitoring parameters  Answered all other questions and concerns  Rx to  [facility] for PAP  CONTINUE metformin  mg daily

## 2024-05-22 ENCOUNTER — OFFICE VISIT (OUTPATIENT)
Dept: PRIMARY CARE | Facility: CLINIC | Age: 64
End: 2024-05-22
Payer: MEDICARE

## 2024-05-22 VITALS
TEMPERATURE: 96.1 F | OXYGEN SATURATION: 94 % | DIASTOLIC BLOOD PRESSURE: 75 MMHG | BODY MASS INDEX: 42.49 KG/M2 | HEIGHT: 66 IN | RESPIRATION RATE: 16 BRPM | HEART RATE: 81 BPM | WEIGHT: 264.4 LBS | SYSTOLIC BLOOD PRESSURE: 114 MMHG

## 2024-05-22 DIAGNOSIS — E11.9 TYPE 2 DIABETES MELLITUS WITHOUT COMPLICATION, WITHOUT LONG-TERM CURRENT USE OF INSULIN (MULTI): ICD-10-CM

## 2024-05-22 DIAGNOSIS — E78.2 MIXED HYPERLIPIDEMIA: ICD-10-CM

## 2024-05-22 DIAGNOSIS — E66.01 OBESITY, CLASS III, BMI 40-49.9 (MORBID OBESITY) (MULTI): ICD-10-CM

## 2024-05-22 DIAGNOSIS — F32.1 MAJOR DEPRESSIVE DISORDER, SINGLE EPISODE, MODERATE (MULTI): Primary | ICD-10-CM

## 2024-05-22 PROCEDURE — G2211 COMPLEX E/M VISIT ADD ON: HCPCS | Performed by: FAMILY MEDICINE

## 2024-05-22 PROCEDURE — 1036F TOBACCO NON-USER: CPT | Performed by: FAMILY MEDICINE

## 2024-05-22 PROCEDURE — 3008F BODY MASS INDEX DOCD: CPT | Performed by: FAMILY MEDICINE

## 2024-05-22 PROCEDURE — 3050F LDL-C >= 130 MG/DL: CPT | Performed by: FAMILY MEDICINE

## 2024-05-22 PROCEDURE — 99214 OFFICE O/P EST MOD 30 MIN: CPT | Performed by: FAMILY MEDICINE

## 2024-05-22 PROCEDURE — 3044F HG A1C LEVEL LT 7.0%: CPT | Performed by: FAMILY MEDICINE

## 2024-05-22 PROCEDURE — 3078F DIAST BP <80 MM HG: CPT | Performed by: FAMILY MEDICINE

## 2024-05-22 PROCEDURE — 3074F SYST BP LT 130 MM HG: CPT | Performed by: FAMILY MEDICINE

## 2024-05-22 RX ORDER — DIAPER,BRIEF,ADULT, DISPOSABLE
500 EACH MISCELLANEOUS
COMMUNITY

## 2024-05-22 RX ORDER — MULTIVITAMIN/IRON/FOLIC ACID 18MG-0.4MG
1 TABLET ORAL DAILY
COMMUNITY

## 2024-05-22 ASSESSMENT — ENCOUNTER SYMPTOMS
DIARRHEA: 0
NAUSEA: 0
POLYPHAGIA: 0
POLYDIPSIA: 0
LIGHT-HEADEDNESS: 0

## 2024-05-22 NOTE — ASSESSMENT & PLAN NOTE
Has lost 4 lbs. Committed to lifestyle change. Recheck in 2 months. Hopefully will be able to get Rybelsus.

## 2024-05-22 NOTE — PROGRESS NOTES
Subjective   Patient ID: Elysia Zuniga is a 63 y.o. female who presents for Follow-up (1 month follow up and prescription medications).    Type 2 DM, new dx last time. Was startd on Metormin and Rybelsus. Here to follow up. Due for foot exam. She picked up Metformin. Was unable to  Rybelsus due to cost. Pharmacist is trying to get her costs down. Is sending out testing equipment. Has appt with pharmacist on June 11. Takes Metformin with food. Still on one daily    Needs eye exam, microalbumin. Here to discuss diabetes.     Hyperlipidemia - started Atorvastatin 20 mg daily. Due for labs in 2 months. No side effects. Muscles felt a little weak initially, like they were tighter. Is doing better.     Obesity - weight down 4 lbs. Is trying to get up to 10 min on the treadmill. She is working on it. She would like nutrition consult. She is eating Greek Yogurt.            Current Outpatient Medications:     ascorbic acid (Vitamin C) 1,000 mg tablet, Take 1 tablet (1,000 mg) by mouth continuously if needed., Disp: , Rfl:     aspirin 325 mg tablet, Take 1 tablet (325 mg) by mouth once daily., Disp: , Rfl:     atorvastatin (Lipitor) 20 mg tablet, Take 1 tablet (20 mg) by mouth once daily., Disp: 100 tablet, Rfl: 3    b complex 0.4 mg tablet, Take 1 tablet by mouth once daily., Disp: , Rfl:     buPROPion XL (Wellbutrin XL) 150 mg 24 hr tablet, Take 1 tablet (150 mg) by mouth once daily in the morning. Do not crush, chew, or split., Disp: 90 tablet, Rfl: 3    busPIRone (Buspar) 15 mg tablet, Take 1 tablet (15 mg) by mouth 3 times a day as needed (anxiety or stress)., Disp: 90 tablet, Rfl: 11    cetirizine (ZyrTEC) 10 mg tablet, 1 tablet (10 mg)., Disp: , Rfl:     diclofenac sodium (Voltaren) 1 % gel gel, Apply 1 Application topically 4 times a day as needed (pain)., Disp: 450 g, Rfl: 2    escitalopram (Lexapro) 10 mg tablet, Take 1 tablet (10 mg) by mouth once daily., Disp: 90 tablet, Rfl: 3    famotidine (Pepcid) 40  mg tablet, Take 1 tablet (40 mg) by mouth once daily at bedtime., Disp: 90 tablet, Rfl: 3    fluticasone (Flonase) 50 mcg/actuation nasal spray, Administer 2 sprays into each nostril once daily. Shake gently. Before first use, prime pump. After use, clean tip and replace cap., Disp: 16 g, Rfl: 6    FreeStyle lancets 28 gauge, Use as instructed to test 3 times weekly, Disp: 100 each, Rfl: 3    lysine 500 mg tablet, Take 500 tablets (250,000 mg) by mouth once daily (M-F)., Disp: , Rfl:     melatonin 10 mg tablet,chewable, Chew once daily at bedtime., Disp: , Rfl:     metFORMIN XR (Glucophage-XR) 500 mg 24 hr tablet, Take 1 tablet (500 mg) by mouth once daily with breakfast. Do not crush, chew, or split., Disp: 100 tablet, Rfl: 3    montelukast (Singulair) 10 mg tablet, Take 1 tablet (10 mg) by mouth once daily at bedtime., Disp: 30 tablet, Rfl: 11    multivitamin tablet, Take 1 tablet by mouth once daily., Disp: , Rfl:     pantoprazole (ProtoNix) 40 mg EC tablet, Take 1 tablet (40 mg) by mouth once daily in the morning. Take before meals. Do not crush, chew, or split., Disp: 30 tablet, Rfl: 11    semaglutide (Rybelsus) 3 mg tablet, Take 1 tablet (3 mg) by mouth once daily., Disp: 30 tablet, Rfl: 1    blood sugar diagnostic (Blood Glucose Test) strip, Use to monitor blood sugars 3 times weekly in the morning, before breakfast (Patient not taking: Reported on 5/22/2024), Disp: 100 each, Rfl: 3    Blood glucose monitoring meter kit kit, Use to test blood sugar as directed (Patient not taking: Reported on 5/22/2024), Disp: 1 each, Rfl: 0    saccharomyces boulardii (Florastor) 250 mg capsule, Take 1 capsule (250 mg) by mouth 2 times a day., Disp: , Rfl:     Patient Active Problem List   Diagnosis    Lumbar spondylolysis    Mixed hyperlipidemia    Major depressive disorder, single episode, moderate (Multi)    Spinal stenosis of lumbar region without neurogenic claudication    Type 2 diabetes mellitus without complication,  "without long-term current use of insulin (Multi)    Obesity, Class III, BMI 40-49.9 (morbid obesity) (Multi)    Abnormal CXR    Right bundle branch block    Arthritis of hip    Spondylolysis, lumbar region    Chronic cough    Apnea    Lung nodule    Snoring    Chronic laryngitis    Gastroesophageal reflux disease with esophagitis    Allergic rhinitis    Obstructive sleep apnea syndrome    Symptomatic varicose veins, bilateral         Review of Systems   Gastrointestinal:  Negative for diarrhea and nausea.   Endocrine: Negative for polydipsia, polyphagia and polyuria.   Skin:  Negative for rash.   Neurological:  Negative for light-headedness.       Objective   /75 (BP Location: Left arm, Patient Position: Sitting, BP Cuff Size: Adult)   Pulse 81   Temp 35.6 °C (96.1 °F) (Temporal)   Resp 16   Ht 1.676 m (5' 6\")   Wt 120 kg (264 lb 6.4 oz)   SpO2 94%   BMI 42.68 kg/m²     Physical Exam  Vitals reviewed.   Constitutional:       Appearance: Normal appearance.   Pulmonary:      Effort: Pulmonary effort is normal.   Neurological:      Mental Status: She is alert.   Psychiatric:         Mood and Affect: Mood normal.         Behavior: Behavior normal.         Assessment/Plan   Problem List Items Addressed This Visit       Mixed hyperlipidemia     Recheck labs in 2 months. Tolerating statin well         Relevant Orders    Referral to Nutrition Services    Comprehensive Metabolic Panel    Lipid Panel    Major depressive disorder, single episode, moderate (Multi) - Primary     Feeling great now. Using Buspar occasionally now. On Bupropion 150 mg daily with no side effects.          Type 2 diabetes mellitus without complication, without long-term current use of insulin (Multi)     Recent diagnosis. On Metformin once a day. Is tolerating well. Working with Pharmacy on getting Rybelus 3 mg. Follow up in 2 months with labs first. Nutrition consult. Needs eye visit. Foot exam done today.   She is scheduling eye exam. " Increasing exercise as much as possible           Relevant Orders    Referral to Nutrition Services    Referral to Ophthalmology    Hemoglobin A1C    Albumin , Urine Random    Obesity, Class III, BMI 40-49.9 (morbid obesity) (Multi)     Has lost 4 lbs. Committed to lifestyle change. Recheck in 2 months. Hopefully will be able to get Rybelsus.          Relevant Orders    Referral to Nutrition Services         Assessment, plans, tests, and follow up discussed with patient and patient verbalized understanding. Elysia was given an opportunity to ask questions and  any concerns were addressed including but not limited to medications, lifestyle, goals of treatment. Follow up.

## 2024-05-22 NOTE — ASSESSMENT & PLAN NOTE
Feeling great now. Using Buspar occasionally now. On Bupropion 150 mg daily with no side effects.

## 2024-05-22 NOTE — ASSESSMENT & PLAN NOTE
Recent diagnosis. On Metformin once a day. Is tolerating well. Working with Pharmacy on getting Rybelus 3 mg. Follow up in 2 months with labs first. Nutrition consult. Needs eye visit. Foot exam done today.   She is scheduling eye exam. Increasing exercise as much as possible

## 2024-05-22 NOTE — PATIENT INSTRUCTIONS
Continue current medications.     Follow up in 2 months with labs first.     You have referral to Nutrition    You have referral to ophthalmology.

## 2024-05-24 ENCOUNTER — PHARMACY VISIT (OUTPATIENT)
Dept: PHARMACY | Facility: CLINIC | Age: 64
End: 2024-05-24
Payer: COMMERCIAL

## 2024-06-10 ENCOUNTER — PATIENT OUTREACH (OUTPATIENT)
Dept: CARE COORDINATION | Facility: CLINIC | Age: 64
End: 2024-06-10
Payer: MEDICARE

## 2024-06-10 PROCEDURE — RXMED WILLOW AMBULATORY MEDICATION CHARGE

## 2024-06-11 ENCOUNTER — APPOINTMENT (OUTPATIENT)
Dept: PHARMACY | Facility: HOSPITAL | Age: 64
End: 2024-06-11
Payer: MEDICARE

## 2024-06-12 ENCOUNTER — PHARMACY VISIT (OUTPATIENT)
Dept: PHARMACY | Facility: CLINIC | Age: 64
End: 2024-06-12
Payer: COMMERCIAL

## 2024-06-25 ENCOUNTER — APPOINTMENT (OUTPATIENT)
Dept: OPHTHALMOLOGY | Facility: CLINIC | Age: 64
End: 2024-06-25
Payer: MEDICARE

## 2024-06-25 DIAGNOSIS — E11.9 TYPE 2 DIABETES MELLITUS WITHOUT COMPLICATION, WITHOUT LONG-TERM CURRENT USE OF INSULIN (MULTI): ICD-10-CM

## 2024-06-25 DIAGNOSIS — H52.13 MYOPIA, BILATERAL: Primary | ICD-10-CM

## 2024-06-25 DIAGNOSIS — H43.812 PVD (POSTERIOR VITREOUS DETACHMENT), LEFT EYE: ICD-10-CM

## 2024-06-25 PROCEDURE — 99204 OFFICE O/P NEW MOD 45 MIN: CPT | Performed by: OPHTHALMOLOGY

## 2024-06-25 ASSESSMENT — VISUAL ACUITY
OS_CC: 20/20
METHOD: SNELLEN - LINEAR
OD_CC: 20/25
OS_CC+: -1
OD_CC+: -2
CORRECTION_TYPE: GLASSES

## 2024-06-25 ASSESSMENT — REFRACTION_WEARINGRX
OD_CYLINDER: -0.25
OS_AXIS: 162
OD_SPHERE: -8.50
OD_AXIS: 003
SPECS_TYPE: SVL
OS_CYLINDER: -0.50
OS_SPHERE: -8.25

## 2024-06-25 ASSESSMENT — CONF VISUAL FIELD
OD_INFERIOR_TEMPORAL_RESTRICTION: 0
OS_SUPERIOR_TEMPORAL_RESTRICTION: 0
OS_INFERIOR_NASAL_RESTRICTION: 0
OD_NORMAL: 1
OS_INFERIOR_TEMPORAL_RESTRICTION: 0
OD_SUPERIOR_NASAL_RESTRICTION: 0
OS_SUPERIOR_NASAL_RESTRICTION: 0
OD_INFERIOR_NASAL_RESTRICTION: 0
OD_SUPERIOR_TEMPORAL_RESTRICTION: 0
OS_NORMAL: 1

## 2024-06-25 ASSESSMENT — REFRACTION_MANIFEST
OS_CYLINDER: -0.50
OS_ADD: +2.25
OD_CYLINDER: -0.25
OD_CYLINDER: -0.25
OD_AXIS: 003
OD_SPHERE: -8.50
OS_CYLINDER: -0.50
OD_SPHERE: -8.50
OS_SPHERE: -8.25
OS_SPHERE: -8.25
OD_ADD: +2.25
OS_AXIS: 162
OD_AXIS: 005
OS_AXIS: 165

## 2024-06-25 ASSESSMENT — EXTERNAL EXAM - RIGHT EYE: OD_EXAM: NORMAL

## 2024-06-25 ASSESSMENT — SLIT LAMP EXAM - LIDS
COMMENTS: NORMAL
COMMENTS: NORMAL

## 2024-06-25 ASSESSMENT — TONOMETRY
IOP_METHOD: GOLDMANN APPLANATION
OS_IOP_MMHG: 12
OD_IOP_MMHG: 12

## 2024-06-25 ASSESSMENT — ENCOUNTER SYMPTOMS: EYES NEGATIVE: 1

## 2024-06-25 ASSESSMENT — CUP TO DISC RATIO
OS_RATIO: 0.2
OD_RATIO: 0.2

## 2024-06-25 ASSESSMENT — EXTERNAL EXAM - LEFT EYE: OS_EXAM: NORMAL

## 2024-06-25 NOTE — PROGRESS NOTES
First visit    Diabetes mellitus (DM) without diabetic retinopathy (DR)  -dx 4 weeks ago, A1c was 6.8  -started on metformin and semaglutide  -has HLD is not under control, BP is ok  On exam - no retinopathy  Monitor with year DFE< educated patient on good BG, BP and lipid control.      2. Myopia OU  Has not worn contacts due to allergies  Cannot wear bifocal either, slips down glasses to see up close  New mrx given, recommend separate reading glasses    3. PVD OS  monitor

## 2024-07-02 ENCOUNTER — OFFICE VISIT (OUTPATIENT)
Dept: PULMONOLOGY | Facility: HOSPITAL | Age: 64
End: 2024-07-02
Payer: MEDICARE

## 2024-07-02 ENCOUNTER — APPOINTMENT (OUTPATIENT)
Dept: PHARMACY | Facility: HOSPITAL | Age: 64
End: 2024-07-02
Payer: MEDICARE

## 2024-07-02 VITALS
OXYGEN SATURATION: 95 % | BODY MASS INDEX: 42.23 KG/M2 | DIASTOLIC BLOOD PRESSURE: 79 MMHG | HEART RATE: 84 BPM | RESPIRATION RATE: 16 BRPM | TEMPERATURE: 97.4 F | HEIGHT: 66 IN | WEIGHT: 262.8 LBS | SYSTOLIC BLOOD PRESSURE: 116 MMHG

## 2024-07-02 DIAGNOSIS — J30.9 CHRONIC ALLERGIC RHINITIS: ICD-10-CM

## 2024-07-02 DIAGNOSIS — G47.33 OSA (OBSTRUCTIVE SLEEP APNEA): Primary | ICD-10-CM

## 2024-07-02 DIAGNOSIS — E11.9 TYPE 2 DIABETES MELLITUS WITHOUT COMPLICATION, WITHOUT LONG-TERM CURRENT USE OF INSULIN (MULTI): Primary | ICD-10-CM

## 2024-07-02 DIAGNOSIS — J37.0 CHRONIC LARYNGITIS: ICD-10-CM

## 2024-07-02 DIAGNOSIS — R05.3 CHRONIC COUGH: ICD-10-CM

## 2024-07-02 DIAGNOSIS — K21.00 GASTROESOPHAGEAL REFLUX DISEASE WITH ESOPHAGITIS, UNSPECIFIED WHETHER HEMORRHAGE: ICD-10-CM

## 2024-07-02 PROCEDURE — 3078F DIAST BP <80 MM HG: CPT | Performed by: INTERNAL MEDICINE

## 2024-07-02 PROCEDURE — 3008F BODY MASS INDEX DOCD: CPT | Performed by: INTERNAL MEDICINE

## 2024-07-02 PROCEDURE — 3044F HG A1C LEVEL LT 7.0%: CPT | Performed by: INTERNAL MEDICINE

## 2024-07-02 PROCEDURE — 99214 OFFICE O/P EST MOD 30 MIN: CPT | Performed by: INTERNAL MEDICINE

## 2024-07-02 PROCEDURE — 3074F SYST BP LT 130 MM HG: CPT | Performed by: INTERNAL MEDICINE

## 2024-07-02 PROCEDURE — 3050F LDL-C >= 130 MG/DL: CPT | Performed by: INTERNAL MEDICINE

## 2024-07-02 PROCEDURE — 1036F TOBACCO NON-USER: CPT | Performed by: INTERNAL MEDICINE

## 2024-07-02 ASSESSMENT — ENCOUNTER SYMPTOMS
COUGH: 1
FATIGUE: 1
SHORTNESS OF BREATH: 1

## 2024-07-02 ASSESSMENT — COLUMBIA-SUICIDE SEVERITY RATING SCALE - C-SSRS
1. IN THE PAST MONTH, HAVE YOU WISHED YOU WERE DEAD OR WISHED YOU COULD GO TO SLEEP AND NOT WAKE UP?: NO
6. HAVE YOU EVER DONE ANYTHING, STARTED TO DO ANYTHING, OR PREPARED TO DO ANYTHING TO END YOUR LIFE?: NO
2. HAVE YOU ACTUALLY HAD ANY THOUGHTS OF KILLING YOURSELF?: NO

## 2024-07-02 ASSESSMENT — PATIENT HEALTH QUESTIONNAIRE - PHQ9
2. FEELING DOWN, DEPRESSED OR HOPELESS: NOT AT ALL
1. LITTLE INTEREST OR PLEASURE IN DOING THINGS: NOT AT ALL
SUM OF ALL RESPONSES TO PHQ9 QUESTIONS 1 AND 2: 0

## 2024-07-02 NOTE — ASSESSMENT & PLAN NOTE
Patient's diabetes is currently controlled, with A1c of 6.8%    Patient assistance for Rybelsus was approved. She has been on therapy for ~2 weeks and is doing well. No side effects seen at this time. She is seeing PCP again on 7/22 for new labs. Discuss at this time we can discuss a dose increase in Rybelsus and stopping metformin to reduce pill burden.    If lipid panel is not improved on recheck, patient may benefit from dose increase of atorvastatin. Will revisit at next visit.     Plan:  CONTINUE Rybelsus 3 mg weekly  Rx to [facility] for PAP  CONTINUE metformin  mg daily

## 2024-07-02 NOTE — PROGRESS NOTES
"Subjective   Patient ID: Elysia Zuniga is a 63 y.o. female who presents for Sleep Apnea (Patient is here for a follow up visit. Patient says her breathing is good. Patient admits to a dry cough that she has had on and off since her surgery. Patient is not on any inhalers or oxygen. Patient is on cpap and she wears it every night, she has no complaints with it. Patient is not a smoker. Patient has no other concerns for today. ).  HPI  Elysia Zuniga is an 63 y.o. female never smoker, presenting for abnormal CXR findings which were not confirmed by CT.  CXR was done for pre-op evaluation for left hip replacement.  No h/o cardiac or pulmonary issues.  She reports frequent bronchitis in the past but that has not been a problem in the last few years.  She has been told that she snores and stops breathing at times when she sleep.  She is always tired and feels like she does not get good quality sleep.  She does have a chronic cough which is worse in the winter.  It does not bother her but she says sometimes it bothers other people.  It is usually worse in the evening which will come on for a few minute and then stops.  She does have allergic rhinitis.  She takes zertec for allergies which she takes every day.  She does have GERD symptoms and has been taking prilosec and famotidine at home.  She does reports some JORGENSEN with emptying groceries from her car for the past couple years but has been very limited by her hip pain.    She is here for follow up. She reports she is doing well. She is using CPAP everynight. She states getting over her 'summer cold.\" She states she has had hoarse voice since her hip surgery in Dec 2023. She is going to see ENT Dr. Bates for evaluation. She states her cough was well controlled until about June 2024. Her PFTs were normal Feb 26, 24. She started APAP in March 2024 and using nightly. She notes sleep quality is better and improvement in EDS and fatigue but some residual symptoms if does " "not sleep adequately at night. She states her DM and depression are under control. She has lost 6 lbs over the last 4 weeks since started Rybelsus. She has mild cough but no expectoration and has sore throat that went around in her household, and is improving already.      Review of Systems   Constitutional:  Positive for fatigue.   Respiratory:  Positive for cough and shortness of breath.    All other systems reviewed and are negative.      Objective   Physical Exam  Vitals and nursing note reviewed.   Constitutional:       Appearance: She is obese.   HENT:      Head: Normocephalic.      Comments: Mallampati class IV     Nose: Nose normal.      Mouth/Throat:      Pharynx: Oropharynx is clear.   Eyes:      Extraocular Movements: Extraocular movements intact.      Conjunctiva/sclera: Conjunctivae normal.      Pupils: Pupils are equal, round, and reactive to light.   Cardiovascular:      Rate and Rhythm: Normal rate and regular rhythm.      Pulses: Normal pulses.      Heart sounds: Normal heart sounds.   Pulmonary:      Effort: Pulmonary effort is normal.      Breath sounds: Normal breath sounds.   Abdominal:      General: Bowel sounds are normal.      Palpations: Abdomen is soft.   Musculoskeletal:         General: Normal range of motion.      Cervical back: Normal range of motion.   Skin:     General: Skin is warm.   Neurological:      General: No focal deficit present.      Mental Status: She is alert and oriented to person, place, and time. Mental status is at baseline.   Psychiatric:         Mood and Affect: Mood normal.         Behavior: Behavior normal.       OBJECTIVE:    Vitals:    /79   Pulse 84   Temp 36.3 °C (97.4 °F)   Resp 16   Ht 1.676 m (5' 6\")   Wt 119 kg (262 lb 12.8 oz)   SpO2 95% Comment: RA  BMI 42.42 kg/m²     General Appearance:  Alert, cooperative, no distress or conversational dyspnea, appears stated age  Head:  Normocephalic, without obvious abnormality, atraumatic  Eyes:  PERRL, " conjunctiva/corneas clear, EOM's intact  Nose:  Nares normal, septum midline, mucosa normal, no drainage or sinus tenderness  Mouth:  Lips, mucosa, and tongue normal; teeth and gums normal, no thrush  Neck:  Supple, symmetrical, trachea midline, no cervical, supraclavicular, and axillary adenopathy; no JVD, not using accessory muscles to breath  Lungs:    Clear to auscultation bilaterally, respirations unlabored, good air movement  Chest wall:  No tenderness or deformity, chest expands symmetrically  Heart:  Regular rate and rhythm, S1 and S2 normal, no murmur, rub or gallop  Abdomen:    Soft, non-tender, non-distended, bs active, no masses, no organomegaly  Extremities:  Extremities atraumatic, no edema, no cyanosis or clubbing, 2+ pulses in all extremities  Skin:  Skin color, texture, turgor normal, no rashes or lesions  Neurologic:  CNII-XII intact. Normal strength, sensation      Data:    Labs reviewed in [facility]      Imaging:  Reviewed    CXR 2-view 12/6/23: Possible lingular cavitation with nonspecific thick-walled lesion.  Consider further evaluation with chest CT.      CT chest without IV contrast 12/8/23:   There is a 6 mm noncalcified subpleural nodule in the posterior left  upper lobe and some linear scarring or discoid atelectasis in the  right middle lobe and inferior left lingula but the lungs otherwise  are clear.  No lung cysts are appreciated.  There is a moderate  retrocardiac hiatal hernia.      ASSESSMENT  62yo never smoker with incidentally found 6mm subpleural ARLYN nodule, low risk patient     Malignancy risk by [place]gar[place] 2.4% (Low risk)    ESS 8/24, poor sleep quality, snoring and witness apnea- high risk for sleep apnea    Chronic cough likely from a combination of poorly controlled allergic rhinitis with post nasal drip and GERD        Assessment/Plan   WESLEY  Chronic cough with suspicion of bronchial asthma  Chronic allergic rhinitis  4.   GERD with hiatal hernia  5.   Solitarly pulmonary  nodule 6 mm ARLYN  6.   BMI 42.4    Recommendations:  -Discussed sleep study with patient in detail.  Severe sleep apnea and significant sleep-related hypoxia is noted. We discussed weight management, side-lying position to avoid supine sleep apnea and management of chronic allergic rhinitis. Started APAP in March 2024. I reviewed data on her MyAir and is satisfactory. She is already losing weight since started Semaglutide. We will obtain data and adjust her pressures.     -repeat CT chest in Dec 2024 ordered per Dr. Claudio already  -Optimized GERD treatment with pantoprazole in a.m. and famotidine at bedtime given significant hiatal hernia and cough related to laryngeal pharyngeal reflux. Discussed surgical opinion. She would like to defer it and would continue with weight loss for now.  -Optimized allergic rhinitis as still has significant postnasal drip and to continue montelukast, Flonase and cetirizine. Her recent worsening appears to be more of a Viral URI that other family members had too.   -Referred to ENT for hoarseness of voice since intubation for surgery in Dec 2023 vs chronic laryngitis  -PFTs normal. Cough variant asthma considered but cough improved with allergic rhinitis and GERD Rx. Defer ICS.     She is compliant with APAP and is working on weight loss. She is adherent to Rx also for allergic rhinitis and GERD. Continue same and follow up in 6-9 months. She will complete CT chest in Dec 2024 in the meanwhile.

## 2024-07-02 NOTE — PROGRESS NOTES
Patient ID: Elysia Zuniga is a 63 y.o. female who presents for Diabetes.    Referring Provider: Xiomara Shrestha, *  PCP: Xiomara Shrestha MD   Last visit with PCP: 5/22/2024 Next visit with PCP: 7/22/2024      Subjective     HPI  Diabetes  Current  Pharmacotherapy:   Metformin  mg daily   Rybelsus 3 mg daily (started 6/14/2024)     SECONDARY PREVENTION  - Statin? Atorvastatin 20 mg  - ACE-I/ARB? No   - Aspirin? Yes    -The 10-year ASCVD risk score (Andrew DIAZ, et al., 2019) is: 9.1%    Values used to calculate the score:      Age: 63 years      Sex: Female      Is Non- : No      Diabetic: Yes      Tobacco smoker: No      Systolic Blood Pressure: 116 mmHg      Is BP treated: No      HDL Cholesterol: 43.8 mg/dL      Total Cholesterol: 248 mg/dL      Current monitoring regimen:   Patient is using: finger sticks, intermittently      SMBG Fasting Readings:   AM  ~110-120 nighttime     [facility] Application    Medication(s): Rybelsus    Application response: [Approved]    Approved until: 6/10/2025    Additional information:  - Patient will get this prescription mailed to them from  pharmacies to receive medication at no cost  - Patient will need to re-enroll in this program prior to expiration date to maintain coverage        Review of Systems      Objective     There were no vitals taken for this visit.   BP Readings from Last 4 Encounters:   07/02/24 116/79   05/22/24 114/75   04/22/24 108/69   03/26/24 137/82      There were no vitals filed for this visit.     Labs  Lab Results   Component Value Date    BILITOT 0.5 04/18/2024    CALCIUM 9.1 04/18/2024    CO2 27 04/18/2024     04/18/2024    CREATININE 0.73 04/18/2024    GLUCOSE 93 04/18/2024    ALKPHOS 91 04/18/2024    K 4.1 04/18/2024    PROT 7.1 04/18/2024     04/18/2024    AST 13 04/18/2024    ALT 9 04/18/2024    BUN 20 04/18/2024    ANIONGAP 13 04/18/2024    ALBUMIN 4.1 04/18/2024    GFRF >90  04/27/2022     Lab Results   Component Value Date    TRIG 272 (H) 04/18/2024    CHOL 248 (H) 04/18/2024    LDLCALC 150 (H) 04/18/2024    HDL 43.8 04/18/2024     Lab Results   Component Value Date    HGBA1C 6.8 (H) 04/18/2024       Current Outpatient Medications   Medication Instructions    ascorbic acid (VITAMIN C) 1,000 mg, oral, Continuous PRN    aspirin 325 mg, oral, Daily    atorvastatin (LIPITOR) 20 mg, oral, Daily    b complex 0.4 mg tablet 1 tablet, oral, Daily    buPROPion XL (WELLBUTRIN XL) 150 mg, oral, Every morning, Do not crush, chew, or split.    busPIRone (BUSPAR) 15 mg, oral, 3 times daily PRN    cetirizine (ZYRTEC) 10 mg    diclofenac sodium (Voltaren) 1 % gel gel 1 Application, Topical, 4 times daily PRN    escitalopram (LEXAPRO) 10 mg, oral, Daily    famotidine (PEPCID) 40 mg, oral, Nightly    fluticasone (Flonase) 50 mcg/actuation nasal spray 2 sprays, Each Nostril, Daily, Shake gently. Before first use, prime pump. After use, clean tip and replace cap.    FreeStyle lancets 28 gauge Use as instructed to test 3 times weekly    lysine 500 mg tablet 500 tablets, oral, Once daily (morning) M-F (5 days a week)    melatonin 10 mg tablet,chewable oral, Nightly    metFORMIN XR (GLUCOPHAGE-XR) 500 mg, oral, Daily with breakfast, Do not crush, chew, or split.    montelukast (SINGULAIR) 10 mg, oral, Nightly    multivitamin tablet 1 tablet, oral, Daily    pantoprazole (PROTONIX) 40 mg, oral, Daily before breakfast, Do not crush, chew, or split.    Rybelsus 3 mg, oral, Daily         Drug Interactions;  None at time of review    Assessment/Plan   Problem List Items Addressed This Visit       Type 2 diabetes mellitus without complication, without long-term current use of insulin (Multi) - Primary     Patient's diabetes is currently controlled, with A1c of 6.8%    Patient assistance for Rybelsus was approved. She has been on therapy for ~2 weeks and is doing well. No side effects seen at this time. She is seeing  PCP again on 7/22 for new labs. Discuss at this time we can discuss a dose increase in Rybelsus and stopping metformin to reduce pill burden.    If lipid panel is not improved on recheck, patient may benefit from dose increase of atorvastatin. Will revisit at next visit.     Plan:  CONTINUE Rybelsus 3 mg weekly  Rx to [facility] for PAP  CONTINUE metformin  mg daily         Relevant Orders    Follow Up In Clinical Pharmacy       Follow-up: 7/30 @ 1:30 PM     Time spent with pt: Total length of time 5(minutes) of the encounter and more than 50% was spent counseling the patient.    Alexandria Linda, PharmD    Continue all meds under the continuation of care with the referring provider and clinical pharmacy team.

## 2024-07-02 NOTE — PATIENT INSTRUCTIONS
Please avoid spicy food, caffeine and alcohol. Follow antireflux measures for hiatal hernia.  Take pantoprazole 40 mg 1 tablet daily before breakfast and Famotidine 40 mg 1 tablet  daily at bedtime.  Take Montelukast 1 tablet at bedtime and continue to take cetirizine daily in the morning.  Take fluticasone nasal spray 2 sprays in each nostril daily.  Keep appointment with ENT Dr. Bates.  Continue to wear Auto PAP every night. Our office will get data from [facility].   Please complete CT chest in Dec 2024.   I will see you back in March 2025. Please bring your APAP machine with you.

## 2024-07-18 ENCOUNTER — APPOINTMENT (OUTPATIENT)
Dept: OTOLARYNGOLOGY | Facility: CLINIC | Age: 64
End: 2024-07-18
Payer: MEDICARE

## 2024-07-18 VITALS — BODY MASS INDEX: 46 KG/M2 | HEIGHT: 66 IN | WEIGHT: 286.2 LBS

## 2024-07-18 DIAGNOSIS — R49.8 OTHER VOICE AND RESONANCE DISORDERS: ICD-10-CM

## 2024-07-18 DIAGNOSIS — R49.8 VOICE FATIGUE: ICD-10-CM

## 2024-07-18 DIAGNOSIS — R49.0 MUSCLE TENSION DYSPHONIA: ICD-10-CM

## 2024-07-18 DIAGNOSIS — R49.0 VOICE HOARSENESS: ICD-10-CM

## 2024-07-18 DIAGNOSIS — J38.3 VOCAL FOLD SCAR: Primary | ICD-10-CM

## 2024-07-18 DIAGNOSIS — J37.0 CHRONIC LARYNGITIS: ICD-10-CM

## 2024-07-18 DIAGNOSIS — R49.0 DYSPHONIA: ICD-10-CM

## 2024-07-18 NOTE — PROGRESS NOTES
ASSESSMENT AND PLAN:   Elysia Zuniga is a 63 y.o. female presenting for an initial visit with findings of voice changes after surgery.      Assessment/Plan     Hoarse voice post hip surgery  - The right vocal cord appears a little stiff with a mild erythema, possibly due to some injury that occurred during her procedure. The left vocal cord has more vibration compared to the right side. The muscles are working fine, there's no paralysis.  - Recommend working with a speech and language pathologist on some exercises to get the vocal cords moving. If that is not successful, there are a number of other techniques, procedures that can be done to help get more vibration there. The patient agreed to see a speech pathologist.    Consider steroid injection in office.        Reason For Consult  Chief Complaint   Patient presents with    New Patient Visit    Hoarseness     Losing voice.      Npv ref by Dr. Aaron Gandhi from pulmonology.    WESLEY, Chronic cough, AR on zyrtec, GERD on prilosec and famotidine, occ JORGENSEN with light activity, hoarse voice since hip surgery Dec 2023.     HISTORY OF PRESENT ILLNESS:  Elysia Zuniga is a 63 y.o. female presenting for an initial visit with me for voice changes.      63-year-old woman referred by Dr. Aaron Randall from pulmonology. She has a history of obstructive sleep apnea, chronic cough, allergic rhinitis on Zyrtec, GERD on Prilosec and Famotidine, occasional dyspnea on exertion with light activity, and a hoarse voice since hip surgery in December of 2023. She reports that her voice problems started after her hip surgery. Initially, she could hardly talk at all, her voice would crack, and she had difficulty speaking. Her voice has been improving since June. She denies losing her voice completely now, although she did in the beginning. She reports that her voice fatigues if she talks a lot during the day and also when she hasn't talked for a long time. She denies any problems  "swallowing and any pain in the neck. She uses a CPAP for her breathing issues and reports that it's going well for her.         Past Medical History  She has a past medical history of Acute lumbar radiculopathy (08/16/2023), Arthritis, De Quervain's tenosynovitis (08/16/2023), Depression, GERD (gastroesophageal reflux disease), Obesity, Class III, BMI 40-49.9 (morbid obesity) (Multi) (11/30/2023), Pain of left hip joint (09/11/2023), Pain of right lower extremity (06/05/2018), Pre-op evaluation (11/30/2023), Shortness of breath (01/22/2024), and Spinal stenosis of lumbar region without neurogenic claudication (08/16/2023). Surgical History  She has a past surgical history that includes Nose surgery; Endometrial ablation; and Hip Arthroplasty (Right, 12/14/2023).   Social History  She reports that she has never smoked. She has never been exposed to tobacco smoke. She has never used smokeless tobacco. She reports current alcohol use of about 2.0 standard drinks of alcohol per week. She reports that she does not currently use drugs after having used the following drugs: Marijuana. Allergies  Bee venom protein (honey bee), Adhesive tape-silicones, Mold, and Penicillins     Family History  Family History   Problem Relation Name Age of Onset    Hypertension Mother      Heart disease Mother      Prostate cancer Father      Heart disease Maternal Grandmother      Diabetes Maternal Grandmother      Asthma Maternal Grandfather      COPD Maternal Grandfather      Heart disease Paternal Grandfather      Breast cancer Neg Hx          Review of Systems  All 10 systems were reviewed and negative except for above.      Physical Exam    ENT Physical Exam      Last Recorded Vitals  Height 1.676 m (5' 6\"), weight 130 kg (286 lb 3.2 oz).    Relevant Results       Patient Reported Outcome Measures         Radiology, Laboratory and Pathology  No results found.    "   ----------------------------------------------------------------------  Procedures   Flexible Laryngoscopy w/ Videostroboscopy    VOICE AND SPEECH CHARACTERISTICS:  Normal spoken speech, (+) moderate dysphonia, (+) severe roughness, (+) mild breathiness, (+) mild asthenia, (+) moderate strain.    Additional Voice Characteristics:   Pitch: low  Intelligibility: reduced.   Resonance: balanced.   Vocal Loudness: reduced.   Breath Support: poor coordination.    Reflux Finding Score  Subglottic edema: 0  Thick Mucus: 0  Granuloma: 0  Ventricular Obliteration: 0  Erythema/Hyperemia: 1  IA Thickenin  TVC Edema: 2  Diffuse Laryngitis: 0     PROCEDURE:    Indications: voice change  PROCEDURE NOTE: FLEXIBLE LARYNGOSCOPY WITH STROBOSCOPY  I recommended a flexible laryngoscopy with stroboscopy based on PE findings, and/or concern for mucosal wave details based upon history and/or for issues associated with hyperreflexic gag on mirror exam concerning for pathology. Risks, benefits, and alternatives were explained. The patient wishes to proceed and gives verbal consent.   Patient is seated in the exam chair. After adequate topical anesthesia, I advance the flexible endoscope. The examination included evaluation of the babin, vallecula, base of tongue, pyriforms, post-cricoid area, larynx and immediate subglottis.  Findings : assessment of the nasopharynx, base of tongue/vallecula, pyriform sinuses, post-cricoid area and pharyngeal walls was without lesion or mass, pharyngeal wall contraction is normal and symmetric, and no pooling of secretions    Gross Arytenoid Movement: symmetric.  Arytenoid Height: normal.   Supraglottic Tension: lateral.  Symmetry: asymmetry.   Amplitude: reduced: right.  Phase Closure: in-phase.  Mucosal Wave Lateral Excursion/Secondary Wave: Right Vocal Cord: moderate restriction - Wave moved up to ¼ the width of the vocal fold.  Periodicity: normal.  Adynamic: right mid cord  Mass: none  Closure:  closed.    Time Spent  Prep time on day of patient encounter: 5-10 minutes  Time spent directly with patient, family or caregiver: 25 minutes  Additional Time Spent on Patient Care Activities/discuss care plan with SLP: 5 minutes  Documentation Time: 10 minutes  Other Time Spent: 0 minutes  Total: 50 minutes

## 2024-07-18 NOTE — PROGRESS NOTES
Nutrition Initial Assessment:     Elysia Zuniga is a 63 y.o. female being seen virtually who was referred by Dr. Shrestha on 5/22/24 for   1. Obesity, Class III, BMI 40-49.9 (morbid obesity) (Multi)        2. Mixed hyperlipidemia        3. Type 2 diabetes mellitus without complication, without long-term current use of insulin (Multi)            Nutrition Assessment    Patient Active Problem List   Diagnosis    Lumbar spondylolysis    Mixed hyperlipidemia    Major depressive disorder, single episode, moderate (Multi)    Spinal stenosis of lumbar region without neurogenic claudication    Type 2 diabetes mellitus without complication, without long-term current use of insulin (Multi)    Obesity, Class III, BMI 40-49.9 (morbid obesity) (Multi)    Abnormal CXR    Right bundle branch block    Arthritis of hip    Spondylolysis, lumbar region    Chronic cough    Apnea    Lung nodule    Snoring    Chronic laryngitis    Gastroesophageal reflux disease with esophagitis    Allergic rhinitis    Obstructive sleep apnea syndrome    Symptomatic varicose veins, bilateral     Nutrition History:  Food & Nutrition History:       -- Food Allergies:  ; Food Intolerances:       -- Vitamin/mineral intake:  ;       -- Prescription medications:       -- GI Symptoms:  ; Occurring >2 weeks?       -- Oral Problems:  ; Dentition:       -- Sleep Habits:       -- Physical Activity:  ;      Diet Recall:     -- Meal 1:       -- Meal 2:       -- Meal 3:       -- Snacks:       -- Food Variety:       -- Fluid Intake:       -- Oral Nutrition Supplement Use:       -- Energy Intake:      Food Preparation:     -- Cooking:       -- Grocery Shopping:       -- Dining Out:      Food Security/Insecurity:      Patient self identified challenges to dietary/lifestyle changes:      Anthropometrics:   ;  ;  ;  ;  ;      Weight History:   Daily Weight  07/02/24 : 119 kg (262 lb 12.8 oz)  05/22/24 : 120 kg (264 lb 6.4 oz)  04/22/24 : 122 kg (268 lb 3.2  "oz)  03/26/24 : 122 kg (268 lb)  03/07/24 : 119 kg (262 lb)  02/23/24 : 120 kg (264 lb)  02/15/24 : 120 kg (265 lb 6.4 oz)  02/04/24 : 117 kg (258 lb)  02/04/24 : 117 kg (258 lb)  02/02/24 : 117 kg (258 lb 8 oz)    Weight Change %:       Nutrition Focused Physical Exam Findings:  {Performed/Deferred:48232}  Subcutaneous Fat Loss:        Muscle Wasting:       Physical Findings:          Nutrition Significant Labs:  A1C:  Lab Results   Component Value Date    HGBA1C 6.8 (H) 04/18/2024   , Lipid Panel:   Lab Results   Component Value Date    CHOL 248 (H) 04/18/2024    HDL 43.8 04/18/2024    CHHDL 5.7 04/18/2024    LDLF 152 (H) 04/27/2022    VLDL 54 (H) 04/18/2024    TRIG 272 (H) 04/18/2024    , Vit D: No results found for: \"VITD25\" , Vit B12: No results found for: \"MODIUNFS44\" , Iron Panel: No results found for: \"IRON\", \"TIBC\", \"FERRITIN\"     Medications:    Current Outpatient Medications:     ascorbic acid (Vitamin C) 1,000 mg tablet, Take 1 tablet (1,000 mg) by mouth continuously if needed., Disp: , Rfl:     aspirin 325 mg tablet, Take 1 tablet (325 mg) by mouth once daily., Disp: , Rfl:     atorvastatin (Lipitor) 20 mg tablet, Take 1 tablet (20 mg) by mouth once daily., Disp: 100 tablet, Rfl: 3    b complex 0.4 mg tablet, Take 1 tablet by mouth once daily., Disp: , Rfl:     buPROPion XL (Wellbutrin XL) 150 mg 24 hr tablet, Take 1 tablet (150 mg) by mouth once daily in the morning. Do not crush, chew, or split., Disp: 90 tablet, Rfl: 3    busPIRone (Buspar) 15 mg tablet, Take 1 tablet (15 mg) by mouth 3 times a day as needed (anxiety or stress)., Disp: 90 tablet, Rfl: 11    cetirizine (ZyrTEC) 10 mg tablet, 1 tablet (10 mg)., Disp: , Rfl:     diclofenac sodium (Voltaren) 1 % gel gel, Apply 1 Application topically 4 times a day as needed (pain)., Disp: 450 g, Rfl: 2    escitalopram (Lexapro) 10 mg tablet, Take 1 tablet (10 mg) by mouth once daily., Disp: 90 tablet, Rfl: 3    famotidine (Pepcid) 40 mg tablet, Take 1 " tablet (40 mg) by mouth once daily at bedtime., Disp: 90 tablet, Rfl: 3    fluticasone (Flonase) 50 mcg/actuation nasal spray, Administer 2 sprays into each nostril once daily. Shake gently. Before first use, prime pump. After use, clean tip and replace cap., Disp: 16 g, Rfl: 6    FreeStyle lancets 28 gauge, Use as instructed to test 3 times weekly, Disp: 100 each, Rfl: 3    lysine 500 mg tablet, Take 500 tablets (250,000 mg) by mouth once daily (M-F)., Disp: , Rfl:     melatonin 10 mg tablet,chewable, Chew once daily at bedtime., Disp: , Rfl:     metFORMIN XR (Glucophage-XR) 500 mg 24 hr tablet, Take 1 tablet (500 mg) by mouth once daily with breakfast. Do not crush, chew, or split., Disp: 100 tablet, Rfl: 3    montelukast (Singulair) 10 mg tablet, Take 1 tablet (10 mg) by mouth once daily at bedtime., Disp: 30 tablet, Rfl: 11    multivitamin tablet, Take 1 tablet by mouth once daily., Disp: , Rfl:     pantoprazole (ProtoNix) 40 mg EC tablet, Take 1 tablet (40 mg) by mouth once daily in the morning. Take before meals. Do not crush, chew, or split., Disp: 30 tablet, Rfl: 11    semaglutide (Rybelsus) 3 mg tablet, Take 1 tablet (3 mg) by mouth once daily., Disp: 30 tablet, Rfl: 1    Estimated Needs:    ;     ;         Nutrition Diagnosis                Nutrition Interventions/Recommendations   Nutrition Prescription:      Nutrition Interventions:   Food and Nutrient Delivery:       Coordination of Care:       Nutrition Education:   Nutrition Education Content:      Nutrition Education Topics Discussed:   ***    Educational Handouts Provided: {JenEducationHandouts:55907}    Nutrition Counseling:      Patient Goals:      {Readiness to Change (Optional):22854}  {Level of Understanding (Optional):32567}  {Anticipated Compliant (Optional):90502}         Nutrition Monitoring and Evaluation                                 Follow Up:

## 2024-07-19 ENCOUNTER — LAB (OUTPATIENT)
Dept: LAB | Facility: LAB | Age: 64
End: 2024-07-19
Payer: MEDICARE

## 2024-07-19 ENCOUNTER — APPOINTMENT (OUTPATIENT)
Dept: NUTRITION | Facility: CLINIC | Age: 64
End: 2024-07-19
Payer: MEDICARE

## 2024-07-19 VITALS — BODY MASS INDEX: 45.96 KG/M2 | HEIGHT: 66 IN | WEIGHT: 286 LBS

## 2024-07-19 DIAGNOSIS — E11.9 TYPE 2 DIABETES MELLITUS WITHOUT COMPLICATION, WITHOUT LONG-TERM CURRENT USE OF INSULIN (MULTI): ICD-10-CM

## 2024-07-19 DIAGNOSIS — E78.2 MIXED HYPERLIPIDEMIA: ICD-10-CM

## 2024-07-19 DIAGNOSIS — E66.01 OBESITY, CLASS III, BMI 40-49.9 (MORBID OBESITY) (MULTI): Primary | ICD-10-CM

## 2024-07-19 LAB
ALBUMIN SERPL BCP-MCNC: 4 G/DL (ref 3.4–5)
ALP SERPL-CCNC: 91 U/L (ref 33–136)
ALT SERPL W P-5'-P-CCNC: 10 U/L (ref 7–45)
ANION GAP SERPL CALC-SCNC: 14 MMOL/L (ref 10–20)
AST SERPL W P-5'-P-CCNC: 14 U/L (ref 9–39)
BILIRUB SERPL-MCNC: 0.6 MG/DL (ref 0–1.2)
BUN SERPL-MCNC: 17 MG/DL (ref 6–23)
CALCIUM SERPL-MCNC: 9.1 MG/DL (ref 8.6–10.3)
CHLORIDE SERPL-SCNC: 106 MMOL/L (ref 98–107)
CHOLEST SERPL-MCNC: 154 MG/DL (ref 0–199)
CHOLESTEROL/HDL RATIO: 3.8
CO2 SERPL-SCNC: 25 MMOL/L (ref 21–32)
CREAT SERPL-MCNC: 0.69 MG/DL (ref 0.5–1.05)
CREAT UR-MCNC: 124.3 MG/DL (ref 20–320)
EGFRCR SERPLBLD CKD-EPI 2021: >90 ML/MIN/1.73M*2
GLUCOSE SERPL-MCNC: 94 MG/DL (ref 74–99)
HDLC SERPL-MCNC: 40.6 MG/DL
LDLC SERPL CALC-MCNC: 69 MG/DL
MICROALBUMIN UR-MCNC: 9 MG/L
MICROALBUMIN/CREAT UR: 7.2 UG/MG CREAT
NON HDL CHOLESTEROL: 113 MG/DL (ref 0–149)
POTASSIUM SERPL-SCNC: 4.5 MMOL/L (ref 3.5–5.3)
PROT SERPL-MCNC: 7 G/DL (ref 6.4–8.2)
SODIUM SERPL-SCNC: 140 MMOL/L (ref 136–145)
TRIGL SERPL-MCNC: 224 MG/DL (ref 0–149)
VLDL: 45 MG/DL (ref 0–40)

## 2024-07-19 PROCEDURE — 36415 COLL VENOUS BLD VENIPUNCTURE: CPT

## 2024-07-19 PROCEDURE — 82570 ASSAY OF URINE CREATININE: CPT

## 2024-07-19 PROCEDURE — 80061 LIPID PANEL: CPT

## 2024-07-19 PROCEDURE — 97802 MEDICAL NUTRITION INDIV IN: CPT | Performed by: DIETITIAN, REGISTERED

## 2024-07-19 PROCEDURE — 82043 UR ALBUMIN QUANTITATIVE: CPT

## 2024-07-19 PROCEDURE — 80053 COMPREHEN METABOLIC PANEL: CPT

## 2024-07-19 PROCEDURE — 83036 HEMOGLOBIN GLYCOSYLATED A1C: CPT

## 2024-07-19 NOTE — PATIENT INSTRUCTIONS
Encouraged patient to pursue balanced eating.   Eat the first meal of the day within about 2 hours of waking up. If that seems too early, then be attentive to the first time of the day that there are signs of hunger appearing, and respond to those hunger signals by eating a meal or snack.   Eat food at least every 5 hours. If it will be longer than 5 hours between meals, consider adding a snack between the meals. Eating at regular intervals can help prevent becoming overly hungry.   Use the Plate Method to balance the type of food and amount of foods on the plate.   At snacks, include a food that is a rich source of protein, and include a food from a second food group. Eating food from 2 food groups at a snack can promote more satisfaction than eating a snack that is very small. A snack should be satisfying and help diminish thoughts of hunger until the next meal.   Spend some time each week planning out what to eat, shop for groceries, and do a little food preparation. Try to create meals that will yield leftovers to save time at another meal. Planning can be one of the most helpful skills for promoting balanced eating.    Strive to make pleasure part of eating healthy. Also strive for a good relationship with food. This shouldn't be a temporary diet that can only be maintained with strict willpower. Be curious about what tastes good to you and what foods feel good in your body. No food is inherently good or bad, and eating shouldn't promote negative feelings about food. Be kind to yourself as you work on balanced eating.    Utilize the Plate Method at meals in order to balance meals.   - 1/3-1/2 of the plate full of non-starchy vegetables. These foods contribute very little carbohydrate to the plate, and they add fiber to the meal. Salad, carrots, bell peppers, zucchini, broccoli, cauliflower, asparagus, radishes, carrots and green beans are a few examples of these foods. They can be served cooked or raw.    -  1/4-1/3 of the plate including protein foods. Examples can include meat, nuts, seeds, cheese, cottage cheese, nut butter, fish, seafood, tofu, and edamame.     - 1/4-1/3 of the plate including carbohydrate foods. These foods include grains, fruit, legumes, starchy vegetables, milk and yogurt. Aim to eat at least half of the daily grains as whole grains.

## 2024-07-20 LAB
EST. AVERAGE GLUCOSE BLD GHB EST-MCNC: 131 MG/DL
HBA1C MFR BLD: 6.2 %

## 2024-07-22 ENCOUNTER — APPOINTMENT (OUTPATIENT)
Dept: PRIMARY CARE | Facility: CLINIC | Age: 64
End: 2024-07-22
Payer: MEDICARE

## 2024-07-30 ENCOUNTER — APPOINTMENT (OUTPATIENT)
Dept: PHARMACY | Facility: HOSPITAL | Age: 64
End: 2024-07-30
Payer: MEDICARE

## 2024-07-30 ENCOUNTER — PHARMACY VISIT (OUTPATIENT)
Dept: PHARMACY | Facility: CLINIC | Age: 64
End: 2024-07-30
Payer: COMMERCIAL

## 2024-07-30 DIAGNOSIS — E11.9 TYPE 2 DIABETES MELLITUS WITHOUT COMPLICATION, WITHOUT LONG-TERM CURRENT USE OF INSULIN (MULTI): ICD-10-CM

## 2024-07-30 PROCEDURE — RXMED WILLOW AMBULATORY MEDICATION CHARGE

## 2024-07-30 NOTE — ASSESSMENT & PLAN NOTE
Patient's diabetes is currently controlled, with A1c of 6.2%    Patient did well with starting Rybelsus however has been without for ~3 weeks because of issues with the pharmacy. I will speak to the pharmacy and get shipped to her ASAP. Because of this, we will wait a few weeks to increase dose.    Lipid panel is improved on recheck.     Plan:  CONTINUE Rybelsus 3 mg weekly  Rx to [facility] for PAP  STOP metformin  mg daily

## 2024-07-30 NOTE — PROGRESS NOTES
Patient ID: Elysia Zuniga is a 63 y.o. female who presents for Diabetes.    Referring Provider: Xiomara Shrestha, *  PCP: Xiomara Shrestha MD   Last visit with PCP: 5/22/2024 Next visit with PCP: 8/12/2024      Subjective     HPI  Diabetes  Current  Pharmacotherapy:   Metformin  mg daily   Rybelsus 3 mg daily     SECONDARY PREVENTION  - Statin? Atorvastatin 20 mg  - ACE-I/ARB? No   - Aspirin? Yes    -The 10-year ASCVD risk score (Andrew DIAZ, et al., 2019) is: 7.1%    Values used to calculate the score:      Age: 63 years      Sex: Female      Is Non- : No      Diabetic: Yes      Tobacco smoker: No      Systolic Blood Pressure: 116 mmHg      Is BP treated: No      HDL Cholesterol: 40.6 mg/dL      Total Cholesterol: 154 mg/dL      Current monitoring regimen:   Patient is using: finger sticks, intermittently      SMBG Fasting Readings:   AM  ~110-120 nighttime     [facility] Application    Medication(s): Rybelsus    Application response: [Approved]    Approved until: 6/10/2025    Additional information:  - Patient will get this prescription mailed to them from  pharmacies to receive medication at no cost  - Patient will need to re-enroll in this program prior to expiration date to maintain coverage        Review of Systems      Objective     There were no vitals taken for this visit.   BP Readings from Last 4 Encounters:   07/02/24 116/79   05/22/24 114/75   04/22/24 108/69   03/26/24 137/82      There were no vitals filed for this visit.     Labs  Lab Results   Component Value Date    BILITOT 0.6 07/19/2024    CALCIUM 9.1 07/19/2024    CO2 25 07/19/2024     07/19/2024    CREATININE 0.69 07/19/2024    GLUCOSE 94 07/19/2024    ALKPHOS 91 07/19/2024    K 4.5 07/19/2024    PROT 7.0 07/19/2024     07/19/2024    AST 14 07/19/2024    ALT 10 07/19/2024    BUN 17 07/19/2024    ANIONGAP 14 07/19/2024    ALBUMIN 4.0 07/19/2024    GFRF >90 04/27/2022     Lab Results    Component Value Date    TRIG 224 (H) 07/19/2024    CHOL 154 07/19/2024    LDLCALC 69 07/19/2024    HDL 40.6 07/19/2024     Lab Results   Component Value Date    HGBA1C 6.2 (H) 07/19/2024       Current Outpatient Medications   Medication Instructions    ascorbic acid (VITAMIN C) 1,000 mg, oral, Continuous PRN    aspirin 325 mg, oral, Daily    atorvastatin (LIPITOR) 20 mg, oral, Daily    b complex 0.4 mg tablet 1 tablet, oral, Daily    buPROPion XL (WELLBUTRIN XL) 150 mg, oral, Every morning, Do not crush, chew, or split.    busPIRone (BUSPAR) 15 mg, oral, 3 times daily PRN    cetirizine (ZYRTEC) 10 mg    diclofenac sodium (Voltaren) 1 % gel gel 1 Application, Topical, 4 times daily PRN    escitalopram (LEXAPRO) 10 mg, oral, Daily    famotidine (PEPCID) 40 mg, oral, Nightly    fluticasone (Flonase) 50 mcg/actuation nasal spray 2 sprays, Each Nostril, Daily, Shake gently. Before first use, prime pump. After use, clean tip and replace cap.    FreeStyle lancets 28 gauge Use as instructed to test 3 times weekly    lysine 500 mg tablet 500 tablets, oral, Once daily (morning) M-F (5 days a week)    melatonin 10 mg tablet,chewable oral, Nightly    montelukast (SINGULAIR) 10 mg, oral, Nightly    multivitamin tablet 1 tablet, oral, Daily    pantoprazole (PROTONIX) 40 mg, oral, Daily before breakfast, Do not crush, chew, or split.    Rybelsus 3 mg, oral, Daily         Drug Interactions;  None at time of review    Assessment/Plan   Problem List Items Addressed This Visit       Type 2 diabetes mellitus without complication, without long-term current use of insulin (Multi)     Patient's diabetes is currently controlled, with A1c of 6.2%    Patient did well with starting Rybelsus however has been without for ~3 weeks because of issues with the pharmacy. I will speak to the pharmacy and get shipped to her ASAP. Because of this, we will wait a few weeks to increase dose.    Lipid panel is improved on recheck.     Plan:  CONTINUE  Rybelsus 3 mg weekly  Rx to [facility] for PAP  STOP metformin  mg daily         Relevant Orders    Follow Up In Clinical Pharmacy       Follow-up: 8/20 @ 1:30 PM     Time spent with pt: Total length of time 5(minutes) of the encounter and more than 50% was spent counseling the patient.    [place]and[place] Linda, PharmD    Continue all meds under the continuation of care with the referring provider and clinical pharmacy team.

## 2024-08-12 ENCOUNTER — APPOINTMENT (OUTPATIENT)
Dept: PRIMARY CARE | Facility: CLINIC | Age: 64
End: 2024-08-12
Payer: MEDICARE

## 2024-08-12 ENCOUNTER — LAB (OUTPATIENT)
Dept: LAB | Facility: LAB | Age: 64
End: 2024-08-12
Payer: MEDICARE

## 2024-08-12 VITALS
WEIGHT: 259 LBS | TEMPERATURE: 97 F | DIASTOLIC BLOOD PRESSURE: 59 MMHG | SYSTOLIC BLOOD PRESSURE: 89 MMHG | BODY MASS INDEX: 41.82 KG/M2 | OXYGEN SATURATION: 92 % | HEART RATE: 93 BPM

## 2024-08-12 DIAGNOSIS — J30.9 CHRONIC ALLERGIC RHINITIS: ICD-10-CM

## 2024-08-12 DIAGNOSIS — E11.9 TYPE 2 DIABETES MELLITUS WITHOUT COMPLICATION, WITHOUT LONG-TERM CURRENT USE OF INSULIN (MULTI): ICD-10-CM

## 2024-08-12 DIAGNOSIS — K21.00 GASTROESOPHAGEAL REFLUX DISEASE WITH ESOPHAGITIS, UNSPECIFIED WHETHER HEMORRHAGE: ICD-10-CM

## 2024-08-12 DIAGNOSIS — E78.2 MIXED HYPERLIPIDEMIA: ICD-10-CM

## 2024-08-12 DIAGNOSIS — E11.9 TYPE 2 DIABETES MELLITUS WITHOUT COMPLICATION, WITHOUT LONG-TERM CURRENT USE OF INSULIN (MULTI): Primary | ICD-10-CM

## 2024-08-12 DIAGNOSIS — E66.01 OBESITY, CLASS III, BMI 40-49.9 (MORBID OBESITY) (MULTI): ICD-10-CM

## 2024-08-12 LAB
T4 FREE SERPL-MCNC: 0.9 NG/DL (ref 0.61–1.12)
TSH SERPL-ACNC: 0.74 MIU/L (ref 0.44–3.98)

## 2024-08-12 PROCEDURE — 3078F DIAST BP <80 MM HG: CPT | Performed by: FAMILY MEDICINE

## 2024-08-12 PROCEDURE — 84439 ASSAY OF FREE THYROXINE: CPT

## 2024-08-12 PROCEDURE — 99214 OFFICE O/P EST MOD 30 MIN: CPT | Performed by: FAMILY MEDICINE

## 2024-08-12 PROCEDURE — 3044F HG A1C LEVEL LT 7.0%: CPT | Performed by: FAMILY MEDICINE

## 2024-08-12 PROCEDURE — 3061F NEG MICROALBUMINURIA REV: CPT | Performed by: FAMILY MEDICINE

## 2024-08-12 PROCEDURE — 3048F LDL-C <100 MG/DL: CPT | Performed by: FAMILY MEDICINE

## 2024-08-12 PROCEDURE — 84443 ASSAY THYROID STIM HORMONE: CPT

## 2024-08-12 PROCEDURE — 1036F TOBACCO NON-USER: CPT | Performed by: FAMILY MEDICINE

## 2024-08-12 PROCEDURE — 36415 COLL VENOUS BLD VENIPUNCTURE: CPT

## 2024-08-12 PROCEDURE — 3074F SYST BP LT 130 MM HG: CPT | Performed by: FAMILY MEDICINE

## 2024-08-12 PROCEDURE — G2211 COMPLEX E/M VISIT ADD ON: HCPCS | Performed by: FAMILY MEDICINE

## 2024-08-12 RX ORDER — MONTELUKAST SODIUM 10 MG/1
10 TABLET ORAL NIGHTLY
Qty: 90 TABLET | Refills: 3 | Status: SHIPPED | OUTPATIENT
Start: 2024-08-12 | End: 2025-08-12

## 2024-08-12 RX ORDER — ATORVASTATIN CALCIUM 40 MG/1
40 TABLET, FILM COATED ORAL DAILY
Qty: 30 TABLET | Refills: 11 | Status: SHIPPED | OUTPATIENT
Start: 2024-08-12 | End: 2025-08-12

## 2024-08-12 RX ORDER — PANTOPRAZOLE SODIUM 40 MG/1
40 TABLET, DELAYED RELEASE ORAL
Qty: 90 TABLET | Refills: 3 | Status: SHIPPED | OUTPATIENT
Start: 2024-08-12 | End: 2025-08-12

## 2024-08-12 ASSESSMENT — ENCOUNTER SYMPTOMS
PALPITATIONS: 0
HEADACHES: 0
SHORTNESS OF BREATH: 0
UNEXPECTED WEIGHT CHANGE: 0
CONFUSION: 0
COUGH: 0

## 2024-08-12 NOTE — PATIENT INSTRUCTIONS
Increased Rybelsus to 7 mg daily.     Increased Atorvastatin to 40 mg. Can use 2 of your 20 mg until get the 40 mg.     Follow up in 2 months with labs first.

## 2024-08-12 NOTE — PROGRESS NOTES
Subjective   Patient ID: Elysia Zuniga is a 63 y.o. female who presents for Follow-up (2 month follow up labs ).    She is here to follow up DM.   On Rybelsus and no side effects. Down 37 lbs. She felt like it was working better at first. Recently, less effective. No side effect.     She saw doctor about hoarseness, starting therapy for that tomorrow.     On Atorvastatin 40 mg, no side effects. She is feeling much more hopeful.     Plans on getting knee addressed in spring.     Bps usually 90/60.            Current Outpatient Medications:     ascorbic acid (Vitamin C) 1,000 mg tablet, Take 1 tablet (1,000 mg) by mouth continuously if needed., Disp: , Rfl:     aspirin 325 mg tablet, Take 1 tablet (325 mg) by mouth once daily., Disp: , Rfl:     b complex 0.4 mg tablet, Take 1 tablet by mouth once daily., Disp: , Rfl:     buPROPion XL (Wellbutrin XL) 150 mg 24 hr tablet, Take 1 tablet (150 mg) by mouth once daily in the morning. Do not crush, chew, or split., Disp: 90 tablet, Rfl: 3    busPIRone (Buspar) 15 mg tablet, Take 1 tablet (15 mg) by mouth 3 times a day as needed (anxiety or stress)., Disp: 90 tablet, Rfl: 11    cetirizine (ZyrTEC) 10 mg tablet, 1 tablet (10 mg)., Disp: , Rfl:     diclofenac sodium (Voltaren) 1 % gel gel, Apply 1 Application topically 4 times a day as needed (pain)., Disp: 450 g, Rfl: 2    escitalopram (Lexapro) 10 mg tablet, Take 1 tablet (10 mg) by mouth once daily., Disp: 90 tablet, Rfl: 3    famotidine (Pepcid) 40 mg tablet, Take 1 tablet (40 mg) by mouth once daily at bedtime., Disp: 90 tablet, Rfl: 3    fluticasone (Flonase) 50 mcg/actuation nasal spray, Administer 2 sprays into each nostril once daily. Shake gently. Before first use, prime pump. After use, clean tip and replace cap., Disp: 16 g, Rfl: 6    FreeStyle lancets 28 gauge, Use as instructed to test 3 times weekly, Disp: 100 each, Rfl: 3    lysine 500 mg tablet, Take 500 tablets (250,000 mg) by mouth once daily (M-F).,  Disp: , Rfl:     melatonin 10 mg tablet,chewable, Chew once daily at bedtime., Disp: , Rfl:     multivitamin tablet, Take 1 tablet by mouth once daily., Disp: , Rfl:     atorvastatin (Lipitor) 40 mg tablet, Take 1 tablet (40 mg) by mouth once daily., Disp: 30 tablet, Rfl: 11    montelukast (Singulair) 10 mg tablet, Take 1 tablet (10 mg) by mouth once daily at bedtime., Disp: 90 tablet, Rfl: 3    pantoprazole (ProtoNix) 40 mg EC tablet, Take 1 tablet (40 mg) by mouth once daily in the morning. Take before meals. Do not crush, chew, or split., Disp: 90 tablet, Rfl: 3    semaglutide (Rybelsus) 7 mg tablet, Take 1 tablet (7 mg) by mouth once daily., Disp: 90 tablet, Rfl: 0    Patient Active Problem List   Diagnosis    Lumbar spondylolysis    Mixed hyperlipidemia    Major depressive disorder, single episode, moderate (Multi)    Spinal stenosis of lumbar region without neurogenic claudication    Type 2 diabetes mellitus without complication, without long-term current use of insulin (Multi)    Obesity, Class III, BMI 40-49.9 (morbid obesity) (Multi)    Abnormal CXR    Right bundle branch block    Arthritis of hip    Spondylolysis, lumbar region    Chronic cough    Apnea    Lung nodule    Snoring    Chronic laryngitis    Gastroesophageal reflux disease with esophagitis    Allergic rhinitis    Obstructive sleep apnea syndrome    Symptomatic varicose veins, bilateral         Review of Systems   Constitutional:  Negative for unexpected weight change.   HENT:          Allergies are worse now.    Respiratory:  Negative for cough and shortness of breath.    Cardiovascular:  Negative for chest pain, palpitations and leg swelling.   Skin:  Negative for rash.   Neurological:  Negative for headaches.   Psychiatric/Behavioral:  Negative for confusion.        Objective   BP 89/59 (BP Location: Left arm, Patient Position: Sitting)   Pulse 93   Temp 36.1 °C (97 °F)   Wt 117 kg (259 lb)   SpO2 92%   BMI 41.82 kg/m²     Physical  Exam  Vitals reviewed.   Constitutional:       Appearance: Normal appearance.   Pulmonary:      Effort: Pulmonary effort is normal.   Neurological:      Mental Status: She is alert.   Psychiatric:         Mood and Affect: Mood normal.         Behavior: Behavior normal.         Assessment/Plan   Problem List Items Addressed This Visit       Mixed hyperlipidemia     Doing great on Atorvastatin 20 mg daily. TG still over 200. Increase to 40 mg. Tolerating well.          Relevant Medications    atorvastatin (Lipitor) 40 mg tablet    Other Relevant Orders    Comprehensive Metabolic Panel    Lipid Panel    Type 2 diabetes mellitus without complication, without long-term current use of insulin (Multi) - Primary     Increased Rybelsus to 7 mg. Doing great. Lost 37 lbs.   Recheck in 2 months.          Relevant Medications    semaglutide (Rybelsus) 7 mg tablet    atorvastatin (Lipitor) 40 mg tablet    Other Relevant Orders    Thyroid Stimulating Hormone    Thyroxine, Free    Comprehensive Metabolic Panel    Hemoglobin A1C    Lipid Panel    Obesity, Class III, BMI 40-49.9 (morbid obesity) (Multi)     Down 37 lb, on Rybelsus for diabetes. Increased Rybelsus. Recheck in 2 months.          Relevant Medications    semaglutide (Rybelsus) 7 mg tablet    Other Relevant Orders    Thyroid Stimulating Hormone    Thyroxine, Free    Comprehensive Metabolic Panel    Gastroesophageal reflux disease with esophagitis     Refilled PPI, reflux much better on it. Does not do well if misses.          Relevant Medications    pantoprazole (ProtoNix) 40 mg EC tablet     Other Visit Diagnoses       Chronic allergic rhinitis        Relevant Medications    montelukast (Singulair) 10 mg tablet              Assessment, plans, tests, and follow up discussed with patient and patient verbalized understanding. Elysia was given an opportunity to ask questions and  any concerns were addressed including but not limited to medications, follow up and labs.

## 2024-08-13 ENCOUNTER — EVALUATION (OUTPATIENT)
Dept: SPEECH THERAPY | Facility: CLINIC | Age: 64
End: 2024-08-13
Payer: MEDICARE

## 2024-08-13 DIAGNOSIS — R49.0 DYSPHONIA: ICD-10-CM

## 2024-08-13 DIAGNOSIS — J38.3 VOCAL FOLD SCAR: Primary | ICD-10-CM

## 2024-08-13 PROCEDURE — 92524 BEHAVRAL QUALIT ANALYS VOICE: CPT | Mod: GN | Performed by: SPEECH-LANGUAGE PATHOLOGIST

## 2024-08-13 ASSESSMENT — PAIN SCALES - GENERAL: PAINLEVEL_OUTOF10: 0 - NO PAIN

## 2024-08-13 ASSESSMENT — PAIN - FUNCTIONAL ASSESSMENT: PAIN_FUNCTIONAL_ASSESSMENT: 0-10

## 2024-08-13 NOTE — PROGRESS NOTES
Speech-Language Pathology    Voice Evaluation    Patient Name: Elysia Zuniga  MRN: [id number]  Today's Date: 8/13/2024     Time Calculation  Start Time: 1430  Stop Time: 1520  Time Calculation (min): 50 min      Current Problem:  Patient Active Problem List   Diagnosis    Lumbar spondylolysis    Mixed hyperlipidemia    Major depressive disorder, single episode, moderate (Multi)    Spinal stenosis of lumbar region without neurogenic claudication    Type 2 diabetes mellitus without complication, without long-term current use of insulin (Multi)    Obesity, Class III, BMI 40-49.9 (morbid obesity) (Multi)    Abnormal CXR    Right bundle branch block    Arthritis of hip    Spondylolysis, lumbar region    Chronic cough    Apnea    Lung nodule    Snoring    Chronic laryngitis    Gastroesophageal reflux disease with esophagitis    Allergic rhinitis    Obstructive sleep apnea syndrome    Symptomatic varicose veins, bilateral    Dysphonia    Vocal fold scar       Voice Assessment:   Patient is a 64 yo female referred to me by Dr. Bates for prolonged hoarseness following intubation for unilateral hip surgery December, 2023. Clinical findings include reduced, restricted vibratory potential on the right vocal fold causing inconsistent closure. Recommendations for voice therapy with possible steroid injection pending response to therapeutic intervention.     SLP consult completed this date with emphasis on vocal wellness. Plan to engage in direct voice therapy targeting RVT/SOVT to facilitate improved mucosal wave to support healthy and efficient voicing.     Wellness items include:  Throat clear reduction strategies (seen to indep'ly apply cough suppression swallow x2)  Mucus removal strategies  Humidification  Nasal saline during CPAP use  Gaviscon for breakthrough reflux  Confidential voice use  Modified voice rest - rest given e/o vocal strain     Overall, patient would benefit from skilled ST in the area of voice in order  to increase vocal wellness, healthy voicing to foster increased participation and comfort levels at home, work and in the community environment.      Voice Plan of Care:  Frequency: x1/eowk  Duration: x12 weeks  Number of Visits: 4-8  Recommendations for therapeutic interventions: Speech/Voice exercises, Vocal hygiene program, Oral resonance techniques, Habituation training, Vocal strengthening techniques  Prognosis: Good  Factors affecting prognosis: None  Discussed Plan of Care: Patient, Physician, Discussed risks/benefits with patient/caregiver, Patient/caregiver agreeable with Plan of Care      Subjective   Current Problem:     Patient is a 64 yo female presenting with c/o postoperative hoarseness. She is referred to me by my laryngology partner, Dr. Bates.     PMHx sig for WESLEY (CPAP, GERD, allergic rhinitis    Patient reports onset immediately following her his surgery in December of last year. Voice demo'd some improvement in the past 3 months however; given slight cough or cold her voice will quickly regress to her early postoperative presentation. Voice is described as effortful and variable. She has difficulty being understood in person and on the phone. Voice will often feel fatigued. Rest helps the voice some but she has not been able to identify strong mechanism for improvement. Voice will worsen with use. She reports a strong increase in throat clearing but states she is unable to expectorate any mucus or phlegm. At times, throat clearing can help improve her voice for a brief period. She denies any difficulty or change to her swallow. She does not use her voice often. Lives in apartment under her mother. Speaks often with her mother, sister and two pets (cat, dog). She does well with her water intake.     Past Medical History  She has a past medical history of Acute lumbar radiculopathy (08/16/2023), Arthritis, De Quervain's tenosynovitis (08/16/2023), Depression, GERD (gastroesophageal reflux  "disease), Obesity, Class III, BMI 40-49.9 (morbid obesity) (Multi) (11/30/2023), Pain of left hip joint (09/11/2023), Pain of right lower extremity (06/05/2018), Pre-op evaluation (11/30/2023), Shortness of breath (01/22/2024), and Spinal stenosis of lumbar region without neurogenic claudication (08/16/2023). Surgical History  She has a past surgical history that includes Nose surgery; Endometrial ablation; and Hip Arthroplasty (Right, 12/14/2023).   Social History  She reports that she has never smoked. She has never been exposed to tobacco smoke. She has never used smokeless tobacco. She reports current alcohol use of about 2.0 standard drinks of alcohol per week. She reports that she does not currently use drugs after having used the following drugs: Marijuana. Allergies  Bee venom protein (honey bee), Adhesive tape-silicones, Mold, and Penicillins       General Visit Information:  Patient Class: Outpatient  Living Environment: Home  Arrival: Independent  Ordering Physician: Dr. Bates  Reason for Referral: postoperative dysphonia    Objective     Pain Assessment:  Pain Assessment: 0-10  0-10 (Numeric) Pain Score: 0 - No pain    VHI-10:  My voice makes it difficult for people to hear me.: Almost always  People have difficulty understanding me in a noisy room.: Almost always  My voice difficulties restrict my personal and social life.: Never  I feel left out of the conversations because of my voice: Never  My voice problem causes me to lose income.: Never  I feel as though I have to strain to produce voice.: Sometimes  The clarity of my voice is unpredictable.: Always  My voice problem upsets me.: Sometimes  My voice makes me feel handicapped.: Never  People ask, \"What's wrong with your voice?\": Always  VHI-10 Total Score: 18      Cough Severity Index:  My cough is worse when I lie down.: 2  My coughing problem causes me to restrict my personal and social life.: 0  I tend to avoid places because of my coughing " "problem.: 0  I feel embarassed because of my coughing problem.: 2  People ask \"What's wrong\" because I cough a lot.: 4  I run out of air when I cough.: 0  My coughing problem affects my voice.: 1  My coughing problem limits my physical activity.: 0  My coughing problem upsets me.: 0  People ask me if I am sick because I cough a lot.: 4  CSI Total Score: 13      Voice Use Inventory:  Voice misuse/abuse: Throat clear  Exposure to Noise: No  Exposure to respiratory irritants: No  Consistent use of singing voice: No  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     Additional Voice Characteristics:   Pitch: low  Intelligibility: reduced.   Resonance: balanced.   Vocal Loudness: reduced.   Breath Support: poor coordination.     Reflux Finding Score  Subglottic edema: 0  Thick Mucus: 0  Granuloma: 0  Ventricular Obliteration: 0  Erythema/Hyperemia: 1  IA Thickenin  TVC Edema: 2  Diffuse Laryngitis: 0      PROCEDURE:    Indications: voice change  PROCEDURE NOTE: FLEXIBLE LARYNGOSCOPY WITH STROBOSCOPY  I recommended a flexible laryngoscopy with stroboscopy based on PE findings, and/or concern for mucosal wave details based upon history and/or for issues associated with hyperreflexic gag on mirror exam concerning for pathology. Risks, benefits, and alternatives were explained. The patient wishes to proceed and gives verbal " consent.   Patient is seated in the exam chair. After adequate topical anesthesia, I advance the flexible endoscope. The examination included evaluation of the babin, vallecula, base of tongue, pyriforms, post-cricoid area, larynx and immediate subglottis.  Findings : assessment of the nasopharynx, base of tongue/vallecula, pyriform sinuses, post-cricoid area and pharyngeal walls was without lesion or mass, pharyngeal wall contraction is normal and symmetric, and no pooling of secretions     Gross Arytenoid Movement: symmetric.  Arytenoid Height: normal.   Supraglottic Tension: lateral.  Symmetry: asymmetry.   Amplitude: reduced: right.  Phase Closure: in-phase.  Mucosal Wave Lateral Excursion/Secondary Wave: Right Vocal Cord: moderate restriction - Wave moved up to ¼ the width of the vocal fold.  Periodicity: normal.  Adynamic: right mid cord  Mass: none  Closure: closed.  (Dr. Bates, 7/18/24)    Voice Treatment:  Individual(s) Educated: Patient  Verbal Education: Risks/benefits of therapy, Results of testing, Communication strategies  Response to Education: Verbalized understanding, Demonstrated understanding, Needs review  Patient/Caregiver Verbalized Understanding and Agreement: Yes        Active       Voice       SLP Goal 1       Start:  08/13/24    Expected End:  12/13/24       Patient will increase vocal wellness and decrease phonotrauma in adherence with clinician prescribed vocal hygiene and wellness program per patient report.          SLP Goal 2       Start:  08/13/24    Expected End:  12/13/24       Patient will increase ability to produce voice without tension within 5 minute conversational task x80% accuracy as judged by clinician observation and/or patient report.          SLP Goal 3       Start:  08/13/24    Expected End:  12/13/24       Patient will demonstrate independent use of voice/swallow/breathing techniques x80% accuracy.             Time In: 1430  Time Out: 1520

## 2024-08-20 ENCOUNTER — APPOINTMENT (OUTPATIENT)
Dept: PHARMACY | Facility: HOSPITAL | Age: 64
End: 2024-08-20
Payer: MEDICARE

## 2024-08-20 DIAGNOSIS — E66.01 OBESITY, CLASS III, BMI 40-49.9 (MORBID OBESITY) (MULTI): ICD-10-CM

## 2024-08-20 DIAGNOSIS — E11.9 TYPE 2 DIABETES MELLITUS WITHOUT COMPLICATION, WITHOUT LONG-TERM CURRENT USE OF INSULIN (MULTI): ICD-10-CM

## 2024-08-20 NOTE — PROGRESS NOTES
Patient ID: Elysia Zuniga is a 63 y.o. female who presents for Diabetes.    Referring Provider: Xiomara Shrestha, *  PCP: Xiomara Shrestha MD   Last visit with PCP: 8/12/2024 Next visit with PCP: 10/16/2024      Subjective     HPI  Diabetes  Current  Pharmacotherapy:    Rybelsus 3 mg daily     SECONDARY PREVENTION  - Statin? Atorvastatin 20 mg  - ACE-I/ARB? No   - Aspirin? Yes    -The ASCVD Risk score (Andrew DIAZ, et al., 2019) failed to calculate for the following reasons:    The valid systolic blood pressure range is 90 to 200 mmHg      Current monitoring regimen:   Patient is using: finger sticks, intermittently      SMBG Fasting Readings:   AM  ~110-120 nighttime      [place] Application    Medication(s): Rybelsus    Application response: [Approved]    Approved until: 6/10/2025    Additional information:  - Patient will get this prescription mailed to them from  pharmacies to receive medication at no cost  - Patient will need to re-enroll in this program prior to expiration date to maintain coverage        Review of Systems      Objective     There were no vitals taken for this visit.   BP Readings from Last 4 Encounters:   08/12/24 89/59   07/02/24 116/79   05/22/24 114/75   04/22/24 108/69      There were no vitals filed for this visit.     Labs  Lab Results   Component Value Date    BILITOT 0.6 07/19/2024    CALCIUM 9.1 07/19/2024    CO2 25 07/19/2024     07/19/2024    CREATININE 0.69 07/19/2024    GLUCOSE 94 07/19/2024    ALKPHOS 91 07/19/2024    K 4.5 07/19/2024    PROT 7.0 07/19/2024     07/19/2024    AST 14 07/19/2024    ALT 10 07/19/2024    BUN 17 07/19/2024    ANIONGAP 14 07/19/2024    ALBUMIN 4.0 07/19/2024    GFRF >90 04/27/2022     Lab Results   Component Value Date    TRIG 224 (H) 07/19/2024    CHOL 154 07/19/2024    LDLCALC 69 07/19/2024    HDL 40.6 07/19/2024     Lab Results   Component Value Date    HGBA1C 6.2 (H) 07/19/2024       Current Outpatient Medications    Medication Instructions    ascorbic acid (VITAMIN C) 1,000 mg, oral, Continuous PRN    aspirin 325 mg, oral, Daily    atorvastatin (LIPITOR) 40 mg, oral, Daily    b complex 0.4 mg tablet 1 tablet, oral, Daily    buPROPion XL (WELLBUTRIN XL) 150 mg, oral, Every morning, Do not crush, chew, or split.    busPIRone (BUSPAR) 15 mg, oral, 3 times daily PRN    cetirizine (ZYRTEC) 10 mg    diclofenac sodium (Voltaren) 1 % gel gel 1 Application, Topical, 4 times daily PRN    escitalopram (LEXAPRO) 10 mg, oral, Daily    famotidine (PEPCID) 40 mg, oral, Nightly    fluticasone (Flonase) 50 mcg/actuation nasal spray 2 sprays, Each Nostril, Daily, Shake gently. Before first use, prime pump. After use, clean tip and replace cap.    FreeStyle lancets 28 gauge Use as instructed to test 3 times weekly    lysine 500 mg tablet 500 tablets, oral, Once daily (morning) M-F (5 days a week)    melatonin 10 mg tablet,chewable oral, Nightly    montelukast (SINGULAIR) 10 mg, oral, Nightly    multivitamin tablet 1 tablet, oral, Daily    pantoprazole (PROTONIX) 40 mg, oral, Daily before breakfast, Do not crush, chew, or split.    semaglutide (RYBELSUS) 7 mg, oral, Daily         Drug Interactions;  None at time of review    Assessment/Plan   Problem List Items Addressed This Visit       Type 2 diabetes mellitus without complication, without long-term current use of insulin (Multi)     Patient's diabetes is currently controlled, with A1c of 6.2%    Patient continued to do well on Rybelsus. Dose was increased at last PCP visit. She has not gotten shipment yet but has been taking 2 tablets of her 3mg at a time. I will check on her refill.     Plan:  START  Rybelsus 7 mg weekly  Rx to [facility] for PAP         Relevant Medications    semaglutide (Rybelsus) 7 mg tablet    Other Relevant Orders    Follow Up In Clinical Pharmacy    Obesity, Class III, BMI 40-49.9 (morbid obesity) (Multi)    Relevant Medications    semaglutide (Rybelsus) 7 mg tablet      Follow-up: 9/17 @ 1:30 PM       Alexandria Linda, Sana    Continue all meds under the continuation of care with the referring provider and clinical pharmacy team.

## 2024-08-20 NOTE — ASSESSMENT & PLAN NOTE
Patient's diabetes is currently controlled, with A1c of 6.2%    Patient continued to do well on Rybelsus. Dose was increased at last PCP visit. She has not gotten shipment yet but has been taking 2 tablets of her 3mg at a time. I will check on her refill.     Plan:  START  Rybelsus 7 mg weekly  Rx to [facility] for PAP

## 2024-08-21 PROCEDURE — RXMED WILLOW AMBULATORY MEDICATION CHARGE

## 2024-08-26 ENCOUNTER — PHARMACY VISIT (OUTPATIENT)
Dept: PHARMACY | Facility: CLINIC | Age: 64
End: 2024-08-26
Payer: COMMERCIAL

## 2024-09-03 DIAGNOSIS — J30.9 CHRONIC ALLERGIC RHINITIS: ICD-10-CM

## 2024-09-03 RX ORDER — FLUTICASONE PROPIONATE 50 MCG
SPRAY, SUSPENSION (ML) NASAL
Qty: 16 G | Refills: 11 | Status: SHIPPED | OUTPATIENT
Start: 2024-09-03

## 2024-09-04 ENCOUNTER — TREATMENT (OUTPATIENT)
Dept: SPEECH THERAPY | Facility: CLINIC | Age: 64
End: 2024-09-04
Payer: MEDICARE

## 2024-09-04 DIAGNOSIS — J38.3 VOCAL FOLD SCAR: ICD-10-CM

## 2024-09-04 DIAGNOSIS — R49.0 DYSPHONIA: Primary | ICD-10-CM

## 2024-09-04 PROCEDURE — 92507 TX SP LANG VOICE COMM INDIV: CPT | Mod: GN | Performed by: SPEECH-LANGUAGE PATHOLOGIST

## 2024-09-04 ASSESSMENT — PAIN SCALES - GENERAL: PAINLEVEL_OUTOF10: 0 - NO PAIN

## 2024-09-04 ASSESSMENT — PAIN - FUNCTIONAL ASSESSMENT: PAIN_FUNCTIONAL_ASSESSMENT: 0-10

## 2024-09-04 NOTE — PROGRESS NOTES
Speech-Language Pathology    SLP Adult Outpatient Speech-Language Pathology Treatment     Patient Name: Elysia Zuniga  MRN: [id number]  Today's Date: 9/4/2024     Time Calculation  Start Time: 1515  Stop Time: 1600  Time Calculation (min): 45 min      Current Problem:   1. Dysphonia        2. Vocal fold scar              SLP Assessment:  SLP TX Intervention Outcome: Making Progress Towards Goals  Prognosis: Excellent  Treatment Tolerance: Patient tolerated treatment well  Barriers: None  Education Provided: Yes    Patient presents for continue voice therapy.    States cough and throat clear has demo'd significant improvement. Reports >50% reduction with use of cough suppression strategies. Endorsed improved voicing as well however; she is quite dysphonic today. She believes this is due to poor sleep. Family have commented positively on vocal quality improvement.      Completed introductory work with RVT exercises this date. Patient does well with hum + lingual anchor, trill work with buccal press and cup bubbles. Best sound with hum and split glides. Patient able to access clear tone with improvement to her speaking voice following completion of voice excs. Plan to re-assess voicing exercises and work to progress to connected speech targets at next visit.    Overall, patient continues to benefit from skilled intervention in the area of voice. Recommend continued tx in order to improve patient's overall vocal eleazar in order to foster increased participation levels at home and in the community environment.     Plan:  Inpatient/Swing Bed or Outpatient: Outpatient  Treatment/Interventions: Voice  SLP TX Plan: Continue Plan of Care  SLP Plan: Skilled SLP  SLP Frequency: Every other week  Duration: 12 weeks  SLP Discharge Recommendations: Home independent  Discussed POC: Patient  Discussed Risks/Benefits: Yes, Patient  Patient/Caregiver Agreeable: Yes      Subjective   Current Problem:  Dysphonia     Most Recent  Visit:  SLP Received On: 09/04/24    General Visit Information:   Reason for Referral: postoperative dysphonia  Referred By: Dr. Bates  Patient Seen During This Visit: Yes  Arrival: Independent      Pain Assessment:   Pain Assessment: 0-10  0-10 (Numeric) Pain Score: 0 - No pain      Objective       Voice:  Voice Interventions: Vocal Hygiene Program, Oral Resonance Techniques, Habituation Training  Voice Comments: RVT - hum+ling anchor, trills (all), cup bubbles, vocal wellness    Active       Voice       SLP Goal 1 (Progressing)       Start:  08/13/24    Expected End:  12/13/24       Patient will increase vocal wellness and decrease phonotrauma in adherence with clinician prescribed vocal hygiene and wellness program per patient report.          SLP Goal 2 (Progressing)       Start:  08/13/24    Expected End:  12/13/24       Patient will increase ability to produce voice without tension within 5 minute conversational task x80% accuracy as judged by clinician observation and/or patient report.          SLP Goal 3 (Progressing)       Start:  08/13/24    Expected End:  12/13/24       Patient will demonstrate independent use of voice/swallow/breathing techniques x80% accuracy.             Outpatient Education:  Adult Outpatient Education  Individual(s) Educated: Patient  Risk and Benefits Discussed with Patient/Caregiver/Other: yes  Patient/Caregiver Demonstrated Understanding: yes  Plan of Care Discussed and Agreed Upon: yes  Patient Response to Education: Patient/Caregiver Verbalized Understanding of Information, Patient/Caregiver Performed Return Demonstration of Exercises/Activities, Patient/Caregiver Asked Appropriate Questions

## 2024-09-17 ENCOUNTER — APPOINTMENT (OUTPATIENT)
Dept: PHARMACY | Facility: HOSPITAL | Age: 64
End: 2024-09-17
Payer: MEDICARE

## 2024-09-17 DIAGNOSIS — E66.01 OBESITY, CLASS III, BMI 40-49.9 (MORBID OBESITY) (MULTI): ICD-10-CM

## 2024-09-17 DIAGNOSIS — E11.9 TYPE 2 DIABETES MELLITUS WITHOUT COMPLICATION, WITHOUT LONG-TERM CURRENT USE OF INSULIN (MULTI): ICD-10-CM

## 2024-09-17 NOTE — PROGRESS NOTES
Patient ID: Elysia Zuniga is a 64 y.o. female who presents for Diabetes.    Referring Provider: Xiomara Shrestha, *  PCP: Xiomara Shrestha MD   Last visit with PCP: 8/12/2024 Next visit with PCP: 10/16/2024      Subjective     HPI  Diabetes  Current  Pharmacotherapy:    Rybelsus 7 mg daily     SECONDARY PREVENTION  - Statin? Atorvastatin 20 mg  - ACE-I/ARB? No   - Aspirin? Yes    -The ASCVD Risk score (Andrew DIAZ, et al., 2019) failed to calculate for the following reasons:    The valid systolic blood pressure range is 90 to 200 mmHg      Current monitoring regimen:   Patient is using: finger sticks, intermittently      SMBG Fasting Readings:   AM  ~110-120 nighttime      [place] Application    Medication(s): Rybelsus    Application response: [Approved]    Approved until: 6/10/2025    Additional information:  - Patient will get this prescription mailed to them from  pharmacies to receive medication at no cost  - Patient will need to re-enroll in this program prior to expiration date to maintain coverage        Review of Systems      Objective     There were no vitals taken for this visit.   BP Readings from Last 4 Encounters:   08/12/24 89/59   07/02/24 116/79   05/22/24 114/75   04/22/24 108/69      There were no vitals filed for this visit.     Labs  Lab Results   Component Value Date    BILITOT 0.6 07/19/2024    CALCIUM 9.1 07/19/2024    CO2 25 07/19/2024     07/19/2024    CREATININE 0.69 07/19/2024    GLUCOSE 94 07/19/2024    ALKPHOS 91 07/19/2024    K 4.5 07/19/2024    PROT 7.0 07/19/2024     07/19/2024    AST 14 07/19/2024    ALT 10 07/19/2024    BUN 17 07/19/2024    ANIONGAP 14 07/19/2024    ALBUMIN 4.0 07/19/2024    GFRF >90 04/27/2022     Lab Results   Component Value Date    TRIG 224 (H) 07/19/2024    CHOL 154 07/19/2024    LDLCALC 69 07/19/2024    HDL 40.6 07/19/2024     Lab Results   Component Value Date    HGBA1C 6.2 (H) 07/19/2024       Current Outpatient Medications    Medication Instructions    ascorbic acid (VITAMIN C) 1,000 mg, oral, Continuous PRN    aspirin 325 mg, oral, Daily    atorvastatin (LIPITOR) 40 mg, oral, Daily    b complex 0.4 mg tablet 1 tablet, oral, Daily    buPROPion XL (WELLBUTRIN XL) 150 mg, oral, Every morning, Do not crush, chew, or split.    busPIRone (BUSPAR) 15 mg, oral, 3 times daily PRN    cetirizine (ZYRTEC) 10 mg    diclofenac sodium (Voltaren) 1 % gel gel 1 Application, Topical, 4 times daily PRN    escitalopram (LEXAPRO) 10 mg, oral, Daily    famotidine (PEPCID) 40 mg, oral, Nightly    fluticasone (Flonase) 50 mcg/actuation nasal spray instill 2 sprays into each nostril once daily. shake gently. before first use, prime pump. after use, clean tip and replace cap.    FreeStyle lancets 28 gauge Use as instructed to test 3 times weekly    lysine 500 mg tablet 500 tablets, oral, Once daily (morning) M-F (5 days a week)    melatonin 10 mg tablet,chewable oral, Nightly    montelukast (SINGULAIR) 10 mg, oral, Nightly    multivitamin tablet 1 tablet, oral, Daily    pantoprazole (PROTONIX) 40 mg, oral, Daily before breakfast, Do not crush, chew, or split.    semaglutide (RYBELSUS) 7 mg, oral, Daily         Drug Interactions;  None at time of review    Assessment/Plan   Problem List Items Addressed This Visit       Type 2 diabetes mellitus without complication, without long-term current use of insulin (Multi)     Patient's diabetes is currently controlled, with A1c of 6.2%    Patient continued to do well on Rybelsus. She is noticing improved appetite suppression and is starting to lose weight again. She would like to continue on current dose for and additional month.     Plan:  CONTINUE  Rybelsus 7 mg weekly  Rx to [facility] for PAP         Relevant Medications    semaglutide (Rybelsus) 7 mg tablet    Other Relevant Orders    Follow Up In Clinical Pharmacy    Obesity, Class III, BMI 40-49.9 (morbid obesity) (Multi)    Relevant Medications    semaglutide  (Rybelsus) 7 mg tablet       Follow-up: 10/15 @ 1:30 PM       Alexandria Linda, PharmD    Continue all meds under the continuation of care with the referring provider and clinical pharmacy team.

## 2024-09-17 NOTE — ASSESSMENT & PLAN NOTE
Patient's diabetes is currently controlled, with A1c of 6.2%    Patient continued to do well on Rybelsus. She is noticing improved appetite suppression and is starting to lose weight again. She would like to continue on current dose for and additional month.     Plan:  CONTINUE  Rybelsus 7 mg weekly  Rx to  [facility] for PAP

## 2024-09-23 PROCEDURE — RXMED WILLOW AMBULATORY MEDICATION CHARGE

## 2024-09-24 ENCOUNTER — TREATMENT (OUTPATIENT)
Dept: SPEECH THERAPY | Facility: CLINIC | Age: 64
End: 2024-09-24
Payer: MEDICARE

## 2024-09-24 DIAGNOSIS — J38.3 VOCAL FOLD SCAR: ICD-10-CM

## 2024-09-24 DIAGNOSIS — R49.0 DYSPHONIA: Primary | ICD-10-CM

## 2024-09-24 PROCEDURE — 92507 TX SP LANG VOICE COMM INDIV: CPT | Mod: GN | Performed by: SPEECH-LANGUAGE PATHOLOGIST

## 2024-09-24 ASSESSMENT — PAIN - FUNCTIONAL ASSESSMENT: PAIN_FUNCTIONAL_ASSESSMENT: 0-10

## 2024-09-24 ASSESSMENT — PAIN SCALES - GENERAL: PAINLEVEL_OUTOF10: 0 - NO PAIN

## 2024-09-24 NOTE — PROGRESS NOTES
Speech-Language Pathology    SLP Adult Outpatient Speech-Language Pathology Treatment     Patient Name: Elysia Zuniga  MRN: [id number]  Today's Date: 9/24/2024     Time Calculation  Start Time: 1515  Stop Time: 1545  Time Calculation (min): 30 min      Current Problem:   1. Dysphonia        2. Vocal fold scar              SLP Assessment:  SLP TX Intervention Outcome: Making Progress Towards Goals  Prognosis: Excellent  Treatment Tolerance: Patient tolerated treatment well  Barriers: None  Education Provided: Yes    Patient presents for continued voice therapy.     States cough has resolved. Now endorses the occasional dry cough but states this is not bothersome and she feels much improved.     Voice has also been gradually improving. She reports increased access to her singing voice stating her range has increased and its become easier for her to sing. She has been diligent with her exercises. Notes voice will worsen when tired. States today is a tired day due to a busy birthday week/weekend. Voice this date is raspy.     Able to access clear voicing within hum posture. Patient able to extend hum to CVC syllable shapes. Successful progression to single word production. Patient able to produce clear voicing for single syllable RVT words with 70% accuracy. Two syllable words met with 50% accuracy with majority of dysphonic productions completed on the second syllable. She was able to improve her performance over time with cues for gentle production and good breath support. Encouraged her to add vocabulary practice to home programming.     Plan to see patient back in 2-3 weeks to assess progress. If dysphonia is limited to tired days, she may consider discharge. If dysphonia persists outside of these days we will continue with therapy and consideration for steroid injection.      Overall, patient continues to benefit from skilled intervention in the area of voice. Recommend continued tx in order to improve patient's  overall vocal eleazar in order to foster increased participation levels at home and in the community environment.   Plan:  Inpatient/Swing Bed or Outpatient: Outpatient  Treatment/Interventions: Voice  SLP TX Plan: Continue Plan of Care  SLP Plan: Skilled SLP  SLP Frequency: Every other week  Duration: 12 weeks  SLP Discharge Recommendations: Home independent  Discussed POC: Patient  Discussed Risks/Benefits: Yes, Patient  Patient/Caregiver Agreeable: Yes      Subjective   Current Problem:  dysphonia    Most Recent Visit:  SLP Received On: 09/24/24    General Visit Information:   Reason for Referral: postoperative dysphonia  Referred By: Dr. Bates  Patient Seen During This Visit: Yes  Arrival: Independent      Pain Assessment:  Pain Assessment  Pain Assessment: 0-10  0-10 (Numeric) Pain Score: 0 - No pain      Objective     Voice:  Voice Interventions: Vocal Hygiene Program, Oral Resonance Techniques, Habituation Training  Voice Comments: hum-> words           Active       Voice       SLP Goal 1 (Progressing)       Start:  08/13/24    Expected End:  12/13/24       Patient will increase vocal wellness and decrease phonotrauma in adherence with clinician prescribed vocal hygiene and wellness program per patient report.          SLP Goal 2 (Progressing)       Start:  08/13/24    Expected End:  12/13/24       Patient will increase ability to produce voice without tension within 5 minute conversational task x80% accuracy as judged by clinician observation and/or patient report.          SLP Goal 3 (Progressing)       Start:  08/13/24    Expected End:  12/13/24       Patient will demonstrate independent use of voice/swallow/breathing techniques x80% accuracy.             Outpatient Education:  Adult Outpatient Education  Individual(s) Educated: Patient  Risk and Benefits Discussed with Patient/Caregiver/Other: yes  Patient/Caregiver Demonstrated Understanding: yes  Plan of Care Discussed and Agreed Upon: yes  Patient  Response to Education: Patient/Caregiver Verbalized Understanding of Information, Patient/Caregiver Performed Return Demonstration of Exercises/Activities, Patient/Caregiver Asked Appropriate Questions

## 2024-09-25 ENCOUNTER — PHARMACY VISIT (OUTPATIENT)
Dept: PHARMACY | Facility: CLINIC | Age: 64
End: 2024-09-25
Payer: COMMERCIAL

## 2024-10-08 ENCOUNTER — TREATMENT (OUTPATIENT)
Dept: SPEECH THERAPY | Facility: CLINIC | Age: 64
End: 2024-10-08
Payer: MEDICARE

## 2024-10-08 DIAGNOSIS — J38.3 VOCAL FOLD SCAR: ICD-10-CM

## 2024-10-08 DIAGNOSIS — R49.0 DYSPHONIA: Primary | ICD-10-CM

## 2024-10-08 PROCEDURE — 92507 TX SP LANG VOICE COMM INDIV: CPT | Mod: GN | Performed by: SPEECH-LANGUAGE PATHOLOGIST

## 2024-10-08 ASSESSMENT — PAIN - FUNCTIONAL ASSESSMENT: PAIN_FUNCTIONAL_ASSESSMENT: 0-10

## 2024-10-08 ASSESSMENT — PAIN SCALES - GENERAL: PAINLEVEL_OUTOF10: 0 - NO PAIN

## 2024-10-08 NOTE — PROGRESS NOTES
Speech-Language Pathology    SLP Adult Outpatient Speech-Language Pathology Treatment     Patient Name: Elysia Zuniga  MRN: [id number]  Today's Date: 10/8/2024     Time Calculation  Start Time: 0930  Stop Time: 1015  Time Calculation (min): 45 min      Current Problem:   1. Dysphonia        2. Vocal fold scar              SLP Assessment:  SLP TX Intervention Outcome: Making Progress Towards Goals  Prognosis: Excellent  Treatment Tolerance: Patient tolerated treatment well  Barriers: None  Education Provided: Yes    Patient presents for continued voice therapy.     Cough has subsided. She is very pleased.     Voice has continued to improve with patient encouraged with better voicing. Rasp has improved with greater vocal flexibility. Her range has increased.     Continues to have mild-mod dysphonia present though patient endorses satisfaction with current voicing abilities. States voice has always has some degree of rasp. Reports voicing to be smoother and easier to use.     Voice will worsen with periods of silence. Given prolonged periods of silence, she will often have increased hoarseness at the beginning of vocal engagement. Discussed opportunities to complete a quick vocal warm-up with her voicing exercises. Patient to apply hum when phone is ringing and to complete brief hum session prior to visiting with friends, family.     Discussed POC with plans to follow-up in clinic in 2 months. We will be prepared for a possible steroid injection pending her voice status. All questions answered. Patient will follow-up prior to upcoming clinic visit PRN.      Overall, patient continues to benefit from skilled intervention in the area of voice. Recommend continued tx in order to improve patient's overall vocal eleazar in order to foster increased participation levels at home and in the community environment.   Plan:  Inpatient/Swing Bed or Outpatient: Outpatient  Treatment/Interventions: Voice  SLP TX Plan: Continue Plan  of Care  SLP Plan: Skilled SLP  SLP Frequency: Every other week  Duration: 12 weeks  SLP Discharge Recommendations: Home independent  Discussed POC: Patient  Discussed Risks/Benefits: Yes, Patient  Patient/Caregiver Agreeable: Yes      Subjective   Current Problem:  Dysphonia, cough     Most Recent Visit:  SLP Received On: 10/08/24    General Visit Information:   Reason for Referral: postoperative dysphonia  Referred By: Dr. Bates  Patient Seen During This Visit: Yes  Arrival: Independent      Pain Assessment:  Pain Assessment  Pain Assessment: 0-10  0-10 (Numeric) Pain Score: 0 - No pain      Objective     Voice:  Voice Interventions: Vocal Hygiene Program, Oral Resonance Techniques, Habituation Training  Voice Comments: hum->words    Active       Voice       SLP Goal 1 (Progressing)       Start:  08/13/24    Expected End:  12/13/24       Patient will increase vocal wellness and decrease phonotrauma in adherence with clinician prescribed vocal hygiene and wellness program per patient report.          SLP Goal 2 (Progressing)       Start:  08/13/24    Expected End:  12/13/24       Patient will increase ability to produce voice without tension within 5 minute conversational task x80% accuracy as judged by clinician observation and/or patient report.          SLP Goal 3 (Progressing)       Start:  08/13/24    Expected End:  12/13/24       Patient will demonstrate independent use of voice/swallow/breathing techniques x80% accuracy.             Outpatient Education:  Adult Outpatient Education  Individual(s) Educated: Patient  Risk and Benefits Discussed with Patient/Caregiver/Other: yes  Patient/Caregiver Demonstrated Understanding: yes  Plan of Care Discussed and Agreed Upon: yes  Patient Response to Education: Patient/Caregiver Verbalized Understanding of Information, Patient/Caregiver Performed Return Demonstration of Exercises/Activities, Patient/Caregiver Asked Appropriate Questions

## 2024-10-15 ENCOUNTER — APPOINTMENT (OUTPATIENT)
Dept: PHARMACY | Facility: HOSPITAL | Age: 64
End: 2024-10-15
Payer: MEDICARE

## 2024-10-15 DIAGNOSIS — E11.9 TYPE 2 DIABETES MELLITUS WITHOUT COMPLICATION, WITHOUT LONG-TERM CURRENT USE OF INSULIN (MULTI): ICD-10-CM

## 2024-10-15 DIAGNOSIS — F32.1 MAJOR DEPRESSIVE DISORDER, SINGLE EPISODE, MODERATE DEGREE (MULTI): ICD-10-CM

## 2024-10-15 PROCEDURE — RXMED WILLOW AMBULATORY MEDICATION CHARGE

## 2024-10-15 RX ORDER — BUPROPION HYDROCHLORIDE 150 MG/1
150 TABLET ORAL EVERY MORNING
Qty: 90 TABLET | Refills: 3 | Status: SHIPPED | OUTPATIENT
Start: 2024-10-15 | End: 2025-10-15

## 2024-10-15 NOTE — PROGRESS NOTES
Patient ID: Elysia Zuniga is a 64 y.o. female who presents for Diabetes.    Referring Provider: Xiomara Shrestha, *  PCP: Xiomara Shrestha MD   Last visit with PCP: 8/12/2024 Next visit with PCP: 10/16/2024      Subjective     HPI  Diabetes  Current  Pharmacotherapy:    Rybelsus 7 mg daily     SECONDARY PREVENTION  - Statin? Atorvastatin 20 mg  - ACE-I/ARB? No   - Aspirin? Yes    -The ASCVD Risk score (Andrew DIAZ, et al., 2019) failed to calculate for the following reasons:    The valid systolic blood pressure range is 90 to 200 mmHg      Current monitoring regimen:   Patient is using: finger sticks, intermittently      SMBG Fasting Readings:   AM  ~110-120 nighttime      [place] Application    Medication(s): Rybelsus    Application response: [Approved]    Approved until: 6/10/2025    Additional information:  - Patient will get this prescription mailed to them from  pharmacies to receive medication at no cost  - Patient will need to re-enroll in this program prior to expiration date to maintain coverage        Review of Systems      Objective     There were no vitals taken for this visit.   BP Readings from Last 4 Encounters:   08/12/24 89/59   07/02/24 116/79   05/22/24 114/75   04/22/24 108/69      There were no vitals filed for this visit.     Labs  Lab Results   Component Value Date    BILITOT 0.6 07/19/2024    CALCIUM 9.1 07/19/2024    CO2 25 07/19/2024     07/19/2024    CREATININE 0.69 07/19/2024    GLUCOSE 94 07/19/2024    ALKPHOS 91 07/19/2024    K 4.5 07/19/2024    PROT 7.0 07/19/2024     07/19/2024    AST 14 07/19/2024    ALT 10 07/19/2024    BUN 17 07/19/2024    ANIONGAP 14 07/19/2024    ALBUMIN 4.0 07/19/2024    GFRF >90 04/27/2022     Lab Results   Component Value Date    TRIG 224 (H) 07/19/2024    CHOL 154 07/19/2024    LDLCALC 69 07/19/2024    HDL 40.6 07/19/2024     Lab Results   Component Value Date    HGBA1C 6.2 (H) 07/19/2024       Current Outpatient Medications    Medication Instructions    ascorbic acid (VITAMIN C) 1,000 mg, oral, Continuous PRN    aspirin 325 mg, oral, Daily    atorvastatin (LIPITOR) 40 mg, oral, Daily    b complex 0.4 mg tablet 1 tablet, oral, Daily    buPROPion XL (WELLBUTRIN XL) 150 mg, oral, Every morning, Do not crush, chew, or split.    busPIRone (BUSPAR) 15 mg, oral, 3 times daily PRN    cetirizine (ZYRTEC) 10 mg    diclofenac sodium (Voltaren) 1 % gel gel 1 Application, Topical, 4 times daily PRN    escitalopram (LEXAPRO) 10 mg, oral, Daily    famotidine (PEPCID) 40 mg, oral, Nightly    fluticasone (Flonase) 50 mcg/actuation nasal spray instill 2 sprays into each nostril once daily. shake gently. before first use, prime pump. after use, clean tip and replace cap.    FreeStyle lancets 28 gauge Use as instructed to test 3 times weekly    lysine 500 mg tablet 500 tablets, oral, Once daily (morning) M-F (5 days a week)    melatonin 10 mg tablet,chewable oral, Nightly    montelukast (SINGULAIR) 10 mg, oral, Nightly    multivitamin tablet 1 tablet, oral, Daily    pantoprazole (PROTONIX) 40 mg, oral, Daily before breakfast, Do not crush, chew, or split.    semaglutide (RYBELSUS) 14 mg, oral, Daily         Drug Interactions;  None at time of review    Assessment/Plan   Problem List Items Addressed This Visit       Type 2 diabetes mellitus without complication, without long-term current use of insulin (Multi)     Patient's diabetes is currently controlled, with A1c of 6.2%    Patient continued to do well on Rybelsus. She has noticed that her weight loss is plateaued again. She would like to increase her dose to maximize weight loss.     Plan:  INCREASE to  Rybelsus 14 mg daily  Rx to  [facility] for PAP         Relevant Medications    semaglutide (Rybelsus) 14 mg tablet tablet    Other Relevant Orders    Referral to Clinical Pharmacy     Other Visit Diagnoses       Major depressive disorder, single episode, moderate degree (Multi)        Relevant  Medications    buPROPion XL (Wellbutrin XL) 150 mg 24 hr tablet            Follow-up: 11/12 @ 1:30 PM       Alexandria Linda PharmD    Continue all meds under the continuation of care with the referring provider and clinical pharmacy team.

## 2024-10-15 NOTE — ASSESSMENT & PLAN NOTE
Patient's diabetes is currently controlled, with A1c of 6.2%    Patient continued to do well on Rybelsus. She has noticed that her weight loss is plateaued again. She would like to increase her dose to maximize weight loss.     Plan:  INCREASE to  Rybelsus 14 mg daily  Rx to [facility] for PAP

## 2024-10-16 ENCOUNTER — APPOINTMENT (OUTPATIENT)
Dept: PRIMARY CARE | Facility: CLINIC | Age: 64
End: 2024-10-16
Payer: MEDICARE

## 2024-10-16 ENCOUNTER — LAB (OUTPATIENT)
Dept: LAB | Facility: LAB | Age: 64
End: 2024-10-16

## 2024-10-16 VITALS
SYSTOLIC BLOOD PRESSURE: 104 MMHG | RESPIRATION RATE: 16 BRPM | HEART RATE: 89 BPM | WEIGHT: 263 LBS | OXYGEN SATURATION: 92 % | HEIGHT: 66 IN | TEMPERATURE: 96.8 F | DIASTOLIC BLOOD PRESSURE: 71 MMHG | BODY MASS INDEX: 42.27 KG/M2

## 2024-10-16 DIAGNOSIS — Z23 NEED FOR INFLUENZA VACCINATION: Primary | ICD-10-CM

## 2024-10-16 DIAGNOSIS — E11.9 TYPE 2 DIABETES MELLITUS WITHOUT COMPLICATION, WITHOUT LONG-TERM CURRENT USE OF INSULIN (MULTI): ICD-10-CM

## 2024-10-16 DIAGNOSIS — E66.01 OBESITY, CLASS III, BMI 40-49.9 (MORBID OBESITY) (MULTI): ICD-10-CM

## 2024-10-16 DIAGNOSIS — F32.1 MAJOR DEPRESSIVE DISORDER, SINGLE EPISODE, MODERATE (MULTI): ICD-10-CM

## 2024-10-16 DIAGNOSIS — F32.1 MAJOR DEPRESSIVE DISORDER, SINGLE EPISODE, MODERATE DEGREE (MULTI): ICD-10-CM

## 2024-10-16 DIAGNOSIS — E78.2 MIXED HYPERLIPIDEMIA: ICD-10-CM

## 2024-10-16 DIAGNOSIS — Z12.11 SCREEN FOR COLON CANCER: ICD-10-CM

## 2024-10-16 LAB
ALBUMIN SERPL BCP-MCNC: 4.1 G/DL (ref 3.4–5)
ALP SERPL-CCNC: 78 U/L (ref 33–136)
ALT SERPL W P-5'-P-CCNC: 11 U/L (ref 7–45)
ANION GAP SERPL CALC-SCNC: 11 MMOL/L (ref 10–20)
AST SERPL W P-5'-P-CCNC: 15 U/L (ref 9–39)
BILIRUB SERPL-MCNC: 0.9 MG/DL (ref 0–1.2)
BUN SERPL-MCNC: 14 MG/DL (ref 6–23)
CALCIUM SERPL-MCNC: 9.1 MG/DL (ref 8.6–10.3)
CHLORIDE SERPL-SCNC: 106 MMOL/L (ref 98–107)
CHOLEST SERPL-MCNC: 158 MG/DL (ref 0–199)
CHOLESTEROL/HDL RATIO: 3.6
CO2 SERPL-SCNC: 27 MMOL/L (ref 21–32)
CREAT SERPL-MCNC: 0.62 MG/DL (ref 0.5–1.05)
EGFRCR SERPLBLD CKD-EPI 2021: >90 ML/MIN/1.73M*2
GLUCOSE SERPL-MCNC: 90 MG/DL (ref 74–99)
HDLC SERPL-MCNC: 43.4 MG/DL
LDLC SERPL CALC-MCNC: 75 MG/DL
NON HDL CHOLESTEROL: 115 MG/DL (ref 0–149)
POTASSIUM SERPL-SCNC: 4.2 MMOL/L (ref 3.5–5.3)
PROT SERPL-MCNC: 7.3 G/DL (ref 6.4–8.2)
SODIUM SERPL-SCNC: 140 MMOL/L (ref 136–145)
TRIGL SERPL-MCNC: 196 MG/DL (ref 0–149)
VLDL: 39 MG/DL (ref 0–40)

## 2024-10-16 PROCEDURE — 3061F NEG MICROALBUMINURIA REV: CPT | Performed by: FAMILY MEDICINE

## 2024-10-16 PROCEDURE — 3008F BODY MASS INDEX DOCD: CPT | Performed by: FAMILY MEDICINE

## 2024-10-16 PROCEDURE — 1036F TOBACCO NON-USER: CPT | Performed by: FAMILY MEDICINE

## 2024-10-16 PROCEDURE — 3044F HG A1C LEVEL LT 7.0%: CPT | Performed by: FAMILY MEDICINE

## 2024-10-16 PROCEDURE — 80053 COMPREHEN METABOLIC PANEL: CPT

## 2024-10-16 PROCEDURE — 99214 OFFICE O/P EST MOD 30 MIN: CPT | Performed by: FAMILY MEDICINE

## 2024-10-16 PROCEDURE — 90656 IIV3 VACC NO PRSV 0.5 ML IM: CPT | Performed by: FAMILY MEDICINE

## 2024-10-16 PROCEDURE — 3074F SYST BP LT 130 MM HG: CPT | Performed by: FAMILY MEDICINE

## 2024-10-16 PROCEDURE — 83036 HEMOGLOBIN GLYCOSYLATED A1C: CPT

## 2024-10-16 PROCEDURE — 3048F LDL-C <100 MG/DL: CPT | Performed by: FAMILY MEDICINE

## 2024-10-16 PROCEDURE — 36415 COLL VENOUS BLD VENIPUNCTURE: CPT

## 2024-10-16 PROCEDURE — G2211 COMPLEX E/M VISIT ADD ON: HCPCS | Performed by: FAMILY MEDICINE

## 2024-10-16 PROCEDURE — 3078F DIAST BP <80 MM HG: CPT | Performed by: FAMILY MEDICINE

## 2024-10-16 PROCEDURE — 80061 LIPID PANEL: CPT

## 2024-10-16 PROCEDURE — G0008 ADMIN INFLUENZA VIRUS VAC: HCPCS | Performed by: FAMILY MEDICINE

## 2024-10-16 RX ORDER — ESCITALOPRAM OXALATE 10 MG/1
10 TABLET ORAL DAILY
Qty: 90 TABLET | Refills: 3 | Status: SHIPPED | OUTPATIENT
Start: 2024-10-16 | End: 2025-10-16

## 2024-10-16 RX ORDER — BUSPIRONE HYDROCHLORIDE 15 MG/1
15 TABLET ORAL 3 TIMES DAILY PRN
Qty: 90 TABLET | Refills: 11 | Status: SHIPPED | OUTPATIENT
Start: 2024-10-16 | End: 2025-10-16

## 2024-10-16 ASSESSMENT — ENCOUNTER SYMPTOMS
POLYDIPSIA: 0
HEADACHES: 0
POLYPHAGIA: 0
SHORTNESS OF BREATH: 0
FATIGUE: 0
NAUSEA: 0
DIZZINESS: 0
APPETITE CHANGE: 1
DIARRHEA: 0
MYALGIAS: 0
PALPITATIONS: 0
UNEXPECTED WEIGHT CHANGE: 1
TROUBLE SWALLOWING: 0

## 2024-10-16 NOTE — PROGRESS NOTES
Subjective   Patient ID: Elysia Zuniga is a 64 y.o. female who presents for Follow-up (2 month follow up ).    DM2 - increased Rybelsus to 14 yesterday. She had plateaued on 7. She was not doing well during past month, ate out a lot. She is feeling better.     Increased Atorvastatin to 40 mg daily. No sdie effects.     Therapy for hoarseness ended last week.     Mammogram scheduled on 10/30/2024           Current Outpatient Medications:     ascorbic acid (Vitamin C) 1,000 mg tablet, Take 1 tablet (1,000 mg) by mouth continuously if needed., Disp: , Rfl:     aspirin 325 mg tablet, Take 1 tablet (325 mg) by mouth once daily., Disp: , Rfl:     atorvastatin (Lipitor) 40 mg tablet, Take 1 tablet (40 mg) by mouth once daily., Disp: 30 tablet, Rfl: 11    b complex 0.4 mg tablet, Take 1 tablet by mouth once daily., Disp: , Rfl:     buPROPion XL (Wellbutrin XL) 150 mg 24 hr tablet, Take 1 tablet (150 mg) by mouth once daily in the morning. Do not crush, chew, or split., Disp: 90 tablet, Rfl: 3    busPIRone (Buspar) 15 mg tablet, Take 1 tablet (15 mg) by mouth 3 times a day as needed (anxiety or stress)., Disp: 90 tablet, Rfl: 11    cetirizine (ZyrTEC) 10 mg tablet, 1 tablet (10 mg)., Disp: , Rfl:     diclofenac sodium (Voltaren) 1 % gel gel, Apply 1 Application topically 4 times a day as needed (pain)., Disp: 450 g, Rfl: 2    escitalopram (Lexapro) 10 mg tablet, Take 1 tablet (10 mg) by mouth once daily., Disp: 90 tablet, Rfl: 3    famotidine (Pepcid) 40 mg tablet, Take 1 tablet (40 mg) by mouth once daily at bedtime., Disp: 90 tablet, Rfl: 3    fluticasone (Flonase) 50 mcg/actuation nasal spray, instill 2 sprays into each nostril once daily. shake gently. before first use, prime pump. after use, clean tip and replace cap., Disp: 16 g, Rfl: 11    FreeStyle lancets 28 gauge, Use as instructed to test 3 times weekly, Disp: 100 each, Rfl: 3    lysine 500 mg tablet, Take 500 tablets (250,000 mg) by mouth once daily (M-F).,  Disp: , Rfl:     melatonin 10 mg tablet,chewable, Chew once daily at bedtime., Disp: , Rfl:     montelukast (Singulair) 10 mg tablet, Take 1 tablet (10 mg) by mouth once daily at bedtime., Disp: 90 tablet, Rfl: 3    multivitamin tablet, Take 1 tablet by mouth once daily., Disp: , Rfl:     pantoprazole (ProtoNix) 40 mg EC tablet, Take 1 tablet (40 mg) by mouth once daily in the morning. Take before meals. Do not crush, chew, or split., Disp: 90 tablet, Rfl: 3    semaglutide (Rybelsus) 14 mg tablet tablet, Take 1 tablet (14 mg) by mouth once daily., Disp: 30 tablet, Rfl: 1    Patient Active Problem List   Diagnosis    Lumbar spondylolysis    Mixed hyperlipidemia    Major depressive disorder, single episode, moderate (Multi)    Spinal stenosis of lumbar region without neurogenic claudication    Type 2 diabetes mellitus without complication, without long-term current use of insulin (Multi)    Obesity, Class III, BMI 40-49.9 (morbid obesity) (Multi)    Abnormal CXR    Right bundle branch block    Arthritis of hip    Spondylolysis, lumbar region    Chronic cough    Apnea    Lung nodule    Snoring    Chronic laryngitis    Gastroesophageal reflux disease with esophagitis    Allergic rhinitis    Obstructive sleep apnea syndrome    Symptomatic varicose veins, bilateral    Dysphonia    Vocal fold scar         Review of Systems   Constitutional:  Positive for appetite change and unexpected weight change. Negative for fatigue.   HENT:  Negative for trouble swallowing.    Respiratory:  Negative for shortness of breath.    Cardiovascular:  Negative for chest pain, palpitations and leg swelling.   Gastrointestinal:  Negative for diarrhea and nausea.   Endocrine: Negative for cold intolerance, heat intolerance, polydipsia, polyphagia and polyuria.   Musculoskeletal:  Negative for myalgias.   Skin:  Negative for rash.   Neurological:  Negative for dizziness and headaches.       Objective   /71 (BP Location: Right arm, Patient  "Position: Sitting, BP Cuff Size: Large adult long)   Pulse 89   Temp 36 °C (96.8 °F) (Temporal)   Resp 16   Ht 1.676 m (5' 5.98\")   Wt 119 kg (263 lb)   SpO2 92%   BMI 42.48 kg/m²     Physical Exam  Vitals reviewed.   Constitutional:       Appearance: Normal appearance.   Pulmonary:      Effort: Pulmonary effort is normal.   Neurological:      Mental Status: She is alert.   Psychiatric:         Mood and Affect: Mood normal.         Behavior: Behavior normal.         Assessment/Plan   Problem List Items Addressed This Visit       Major depressive disorder, single episode, moderate (Multi)     She is doing well on medication. No side effects. Mood is good.         Type 2 diabetes mellitus without complication, without long-term current use of insulin (Multi)     Labs today. Just increased Rybelsus to 14 mg daily. She will continue to follow up with Alexandria Linda.           Other Visit Diagnoses       Need for influenza vaccination    -  Primary    Relevant Orders    Flu vaccine, trivalent, preservative free, age 6 months and greater (Fluraix/Fluzone/Flulaval)    Screen for colon cancer        Relevant Orders    Colonoscopy Screening; Average Risk Patient              Assessment, plans, tests, and follow up discussed with patient and patient verbalized understanding. Elysia was given an opportunity to ask questions and  any concerns were addressed including but not limited to meds, shots and follow up.  "

## 2024-10-16 NOTE — PATIENT INSTRUCTIONS
You are due for RSV and COVID shots at the pharmacy.     Have labs today.     Colonoscopy ordered.     Follow up with new PCP in 1-3 months.

## 2024-10-16 NOTE — ASSESSMENT & PLAN NOTE
Labs today. Just increased Rybelsus to 14 mg daily. She will continue to follow up with Alexandria Linda.

## 2024-10-17 LAB
EST. AVERAGE GLUCOSE BLD GHB EST-MCNC: 123 MG/DL
HBA1C MFR BLD: 5.9 %

## 2024-10-18 ENCOUNTER — PHARMACY VISIT (OUTPATIENT)
Dept: PHARMACY | Facility: CLINIC | Age: 64
End: 2024-10-18
Payer: COMMERCIAL

## 2024-10-21 ENCOUNTER — TELEPHONE (OUTPATIENT)
Dept: GASTROENTEROLOGY | Facility: CLINIC | Age: 64
End: 2024-10-21
Payer: MEDICARE

## 2024-10-21 NOTE — TELEPHONE ENCOUNTER
----- Message from Mally BATISTA sent at 10/21/2024  9:53 AM EDT -----  Regarding: RE: Office Visit  Lvm to call our office  ----- Message -----  From: Marlena Tamez RN  Sent: 10/17/2024   2:36 PM EDT  To: [id number] Cler[facility]  Subject: Office Visit                                     Office Visit BMI 43,

## 2024-10-30 ENCOUNTER — APPOINTMENT (OUTPATIENT)
Dept: OBSTETRICS AND GYNECOLOGY | Facility: CLINIC | Age: 64
End: 2024-10-30
Payer: MEDICARE

## 2024-10-30 VITALS
WEIGHT: 260 LBS | SYSTOLIC BLOOD PRESSURE: 102 MMHG | BODY MASS INDEX: 41.78 KG/M2 | HEIGHT: 66 IN | DIASTOLIC BLOOD PRESSURE: 72 MMHG

## 2024-10-30 DIAGNOSIS — Z12.31 BREAST CANCER SCREENING BY MAMMOGRAM: Primary | ICD-10-CM

## 2024-10-30 DIAGNOSIS — Z01.419 WELL WOMAN EXAM WITH ROUTINE GYNECOLOGICAL EXAM: ICD-10-CM

## 2024-10-30 PROCEDURE — 3074F SYST BP LT 130 MM HG: CPT | Performed by: OBSTETRICS & GYNECOLOGY

## 2024-10-30 PROCEDURE — 99396 PREV VISIT EST AGE 40-64: CPT | Performed by: OBSTETRICS & GYNECOLOGY

## 2024-10-30 PROCEDURE — 3044F HG A1C LEVEL LT 7.0%: CPT | Performed by: OBSTETRICS & GYNECOLOGY

## 2024-10-30 PROCEDURE — 3078F DIAST BP <80 MM HG: CPT | Performed by: OBSTETRICS & GYNECOLOGY

## 2024-10-30 PROCEDURE — 1036F TOBACCO NON-USER: CPT | Performed by: OBSTETRICS & GYNECOLOGY

## 2024-10-30 PROCEDURE — 3048F LDL-C <100 MG/DL: CPT | Performed by: OBSTETRICS & GYNECOLOGY

## 2024-10-30 PROCEDURE — 3061F NEG MICROALBUMINURIA REV: CPT | Performed by: OBSTETRICS & GYNECOLOGY

## 2024-10-30 PROCEDURE — 3008F BODY MASS INDEX DOCD: CPT | Performed by: OBSTETRICS & GYNECOLOGY

## 2024-10-30 ASSESSMENT — ENCOUNTER SYMPTOMS
COUGH: 0
APNEA: 0
DIZZINESS: 0
JOINT SWELLING: 0
DIARRHEA: 0
HEMATURIA: 0
AGITATION: 0
ACTIVITY CHANGE: 0
CONSTIPATION: 0

## 2024-11-11 NOTE — PROGRESS NOTES
Clinical Pharmacy Appointment    Patient ID: Elysia Zuniga is a 64 y.o. female who presents for Diabetes    Pt is here for Follow Up    Referring Provider: Xiomara Shrestha, *  PCP: None (Dr. Dariusz Espinoza to cover)  Last visit with PCP: 10/16/2024   Next visit with PCP: --        Subjective     Interval History  Dose increased to Rybelsus 14 mg    HPI  Diabetes  Current  Pharmacotherapy:    Rybelsus 7 mg daily     SECONDARY PREVENTION  - Statin? Atorvastatin 20 mg  - ACE-I/ARB? No   - Aspirin? Yes    -The 10-year ASCVD risk score (Andrew DIAZ, et al., 2019) is: 6%    Values used to calculate the score:      Age: 64 years      Sex: Female      Is Non- : No      Diabetic: Yes      Tobacco smoker: No      Systolic Blood Pressure: 102 mmHg      Is BP treated: No      HDL Cholesterol: 43.4 mg/dL      Total Cholesterol: 158 mg/dL      Current monitoring regimen:   Patient is using: finger sticks, intermittently      SMBG Fasting Readings:   AM  ~110-120 nighttime      VAF Application    Medication(s): Rybelsus    Application response: [Approved]    Approved until: 6/10/2025    Additional information:  - Patient will get this prescription mailed to them from  pharmacies to receive medication at no cost  - Patient will need to re-enroll in this program prior to expiration date to maintain coverage          Objective   Allergies   Allergen Reactions    Bee Venom Protein (Honey Bee) Other and Shortness of breath    Adhesive Tape-Silicones Hives    Mold Unknown    Penicillins Hives, Itching, Other and Swelling     Hives sob     Social History     Social History Narrative    Not on file      Medication Review  Current Outpatient Medications   Medication Instructions    ascorbic acid (VITAMIN C) 1,000 mg, Continuous PRN    aspirin 325 mg, Daily    atorvastatin (LIPITOR) 40 mg, oral, Daily    b complex 0.4 mg tablet 1 tablet, Daily    buPROPion XL (WELLBUTRIN XL) 150 mg, oral, Every  morning, Do not crush, chew, or split.    busPIRone (BUSPAR) 15 mg, oral, 3 times daily PRN    cetirizine (ZYRTEC) 10 mg    diclofenac sodium (Voltaren) 1 % gel gel 1 Application, Topical, 4 times daily PRN    escitalopram (LEXAPRO) 10 mg, oral, Daily    famotidine (PEPCID) 40 mg, oral, Nightly    fluticasone (Flonase) 50 mcg/actuation nasal spray instill 2 sprays into each nostril once daily. shake gently. before first use, prime pump. after use, clean tip and replace cap.    FreeStyle lancets 28 gauge Use as instructed to test 3 times weekly    lysine 500 mg tablet 500 tablets, Once daily (morning) M-F (5 days a week)    melatonin 10 mg tablet,chewable Nightly    montelukast (SINGULAIR) 10 mg, oral, Nightly    multivitamin tablet 1 tablet, Daily    pantoprazole (PROTONIX) 40 mg, oral, Daily before breakfast, Do not crush, chew, or split.    semaglutide (RYBELSUS) 14 mg, oral, Daily      Vitals  BP Readings from Last 2 Encounters:   10/30/24 102/72   10/16/24 104/71     BMI Readings from Last 1 Encounters:   10/30/24 41.97 kg/m²      Labs  A1C  Lab Results   Component Value Date    HGBA1C 5.9 (H) 10/16/2024    HGBA1C 6.2 (H) 07/19/2024    HGBA1C 6.8 (H) 04/18/2024     BMP  Lab Results   Component Value Date    CALCIUM 9.1 10/16/2024     10/16/2024    K 4.2 10/16/2024    CO2 27 10/16/2024     10/16/2024    BUN 14 10/16/2024    CREATININE 0.62 10/16/2024    EGFR >90 10/16/2024     LFTs  Lab Results   Component Value Date    ALT 11 10/16/2024    AST 15 10/16/2024    ALKPHOS 78 10/16/2024    BILITOT 0.9 10/16/2024     FLP  Lab Results   Component Value Date    TRIG 196 (H) 10/16/2024    CHOL 158 10/16/2024    LDLF 152 (H) 04/27/2022    LDLCALC 75 10/16/2024    HDL 43.4 10/16/2024     Urine Microalbumin  Lab Results   Component Value Date    MICROALBCREA 7.2 07/19/2024     Weight Management  Wt Readings from Last 3 Encounters:   10/30/24 118 kg (260 lb)   10/16/24 119 kg (263 lb)   08/12/24 117 kg (259 lb)       There is no height or weight on file to calculate BMI.     Assessment/Plan   Problem List Items Addressed This Visit       Mixed hyperlipidemia     Patient and I review updated lipid panel. TC and LDL are similar to last readings and TG are making good improvements downward. Should be re-tested in 3-6 months with new PCP     Plan:  CONTINUE atorvastatin 20 mg daily          Relevant Medications    atorvastatin (Lipitor) 40 mg tablet    Other Relevant Orders    Referral to Clinical Pharmacy    Type 2 diabetes mellitus without complication, without long-term current use of insulin (Multi)     Patient's diabetes is currently controlled, with A1c of 6.2%    Patient continued to do well on Rybelsus. She has continued to see good weight loss on this dose. It has now slowed to 1-2 pounds of loss every week. We will plan to continue on this dose and re-assess progress in 3 months.    Plan:  CONTINUE  Rybelsus 14 mg daily  Rx to [facility] for PAP         Relevant Medications    semaglutide (Rybelsus) 14 mg tablet tablet    atorvastatin (Lipitor) 40 mg tablet    Other Relevant Orders    Referral to Clinical Pharmacy     Patient is aware that they will need to establish care with a new PCP to continue with any further Pharmacy visits.     Clinical Pharmacist follow-up: 2/18 @1:20 PM, Telehealth visit    Continue all meds under the continuation of care with the referring provider and clinical pharmacy team.    Thank you,  Alexandria Linda, PharmD  Clinical Pharmacy Specialist     Verbal consent to manage patient's drug therapy was obtained from the patient. They were informed they may decline to participate or withdraw from participation in pharmacy services at any time.

## 2024-11-12 ENCOUNTER — APPOINTMENT (OUTPATIENT)
Dept: PHARMACY | Facility: HOSPITAL | Age: 64
End: 2024-11-12
Payer: MEDICARE

## 2024-11-12 DIAGNOSIS — E78.2 MIXED HYPERLIPIDEMIA: ICD-10-CM

## 2024-11-12 DIAGNOSIS — E11.9 TYPE 2 DIABETES MELLITUS WITHOUT COMPLICATION, WITHOUT LONG-TERM CURRENT USE OF INSULIN (MULTI): ICD-10-CM

## 2024-11-12 PROCEDURE — RXMED WILLOW AMBULATORY MEDICATION CHARGE

## 2024-11-12 RX ORDER — ATORVASTATIN CALCIUM 40 MG/1
40 TABLET, FILM COATED ORAL DAILY
Qty: 90 TABLET | Refills: 1 | Status: SHIPPED | OUTPATIENT
Start: 2024-11-12 | End: 2025-11-12

## 2024-11-12 NOTE — ASSESSMENT & PLAN NOTE
Patient's diabetes is currently controlled, with A1c of 6.2%    Patient continued to do well on Rybelsus. She has continued to see good weight loss on this dose. It has now slowed to 1-2 pounds of loss every week. We will plan to continue on this dose and re-assess progress in 3 months.    Plan:  CONTINUE  Rybelsus 14 mg daily  [facility]

## 2024-11-12 NOTE — ASSESSMENT & PLAN NOTE
Patient and I review updated lipid panel. TC and LDL are similar to last readings and TG are making good improvements downward. Should be re-tested in 3-6 months with new PCP     Plan:  CONTINUE atorvastatin 20 mg daily

## 2024-11-14 ENCOUNTER — PHARMACY VISIT (OUTPATIENT)
Dept: PHARMACY | Facility: CLINIC | Age: 64
End: 2024-11-14
Payer: COMMERCIAL

## 2024-11-29 NOTE — PROGRESS NOTES
Subjective   Patient ID: Elysia Zuniga is a 64 y.o. female who was referred by her PCP for Colonoscopy (OA fail BMI/No previous scopes , maternal uncle colon cancer in 80's ).    Patient's PCP is Dr. Shrestha    PMH: HLD, RBBB, DM2, depression, WESLEY    HPI  Patient is here for her first screening colonoscopy. She states that she has chronic heartburn, which is currently well controlled on pantoprazole 40mg daily. She denies unexplained weight loss, dysphagia, N/V, abdominal pain, change in bowel habits, melena, hematochezia.    Social History:  Tobacco: Denies   Alcohol: Denies   Drug use: Denies     Family History: Reports 1 uncle with colon cancer.    Prior GI evaluation:  None    Review of Systems:  Constitutional: No reported fever, chills, or weight loss.  Skin: No reported icterus, lesions, or rash.  Eye: No reported itching, pain, vision changes.  Ear: No reported discharge, hearing loss, or pain.  Nose: No reported congestion, discharge, or epistaxis.  Mouth/throat: No reported dysphagia, hoarseness, or throat pain.  Resp: No reported cough, dyspnea, or wheezing.  Cardiovascular: No reported chest pain, lower extremity edema, or palpitations.   GI: No reported abdominal pain, diarrhea, constipation, change in bowel habits, nausea, or vomiting.  : No reported dysuria, hematuria, or frequency.  Neuro: No reported confusion, memory loss, headaches, or dizziness.  Psych: No reported anxiety, depression, or insomnia.  Musculoskeletal: No reported arthralgia, joint swelling, or myalgias.  Heme/lymph: No reported easy bleeding or bruising, or swollen lymph nodes.  Endocrine: No reported cold/heat intolerance, polydipsia, or polyuria.     Medications:  Prior to Admission medications    Medication Sig Start Date End Date Taking? Authorizing Provider   ascorbic acid (Vitamin C) 1,000 mg tablet Take 1 tablet (1,000 mg) by mouth continuously if needed.    Historical Provider, MD   aspirin 325 mg tablet Take 1  tablet (325 mg) by mouth once daily.    Historical Provider, MD   atorvastatin (Lipitor) 40 mg tablet Take 1 tablet (40 mg) by mouth once daily. 11/12/24 11/12/25  Erinn Espinoza MD   b complex 0.4 mg tablet Take 1 tablet by mouth once daily.    Historical Provider, MD   buPROPion XL (Wellbutrin XL) 150 mg 24 hr tablet Take 1 tablet (150 mg) by mouth once daily in the morning. Do not crush, chew, or split. 10/15/24 10/15/25  Xiomara Shrestha MD   busPIRone (Buspar) 15 mg tablet Take 1 tablet (15 mg) by mouth 3 times a day as needed (anxiety or stress). 10/16/24 10/16/25  Xiomara Shrestha MD   cetirizine (ZyrTEC) 10 mg tablet 1 tablet (10 mg).    Historical Provider, MD   diclofenac sodium (Voltaren) 1 % gel gel Apply 1 Application topically 4 times a day as needed (pain). 9/11/23   Xiomara Shrestha MD   escitalopram (Lexapro) 10 mg tablet Take 1 tablet (10 mg) by mouth once daily. 10/16/24 10/16/25  Xiomara Shrestha MD   famotidine (Pepcid) 40 mg tablet Take 1 tablet (40 mg) by mouth once daily at bedtime. 2/2/24 2/1/25  Aaron Gandhi MD   fluticasone (Flonase) 50 mcg/actuation nasal spray instill 2 sprays into each nostril once daily. shake gently. before first use, prime pump. after use, clean tip and replace cap. 9/3/24   KYA Tapia-CNP   FreeStyle lancets 28 gauge Use as instructed to test 3 times weekly 5/21/24   Xiomara Shrestha MD   lysine 500 mg tablet Take 500 tablets (250,000 mg) by mouth once daily (M-F).    Historical Provider, MD   melatonin 10 mg tablet,chewable Chew once daily at bedtime.    Historical Provider, MD   montelukast (Singulair) 10 mg tablet Take 1 tablet (10 mg) by mouth once daily at bedtime. 8/12/24 8/12/25  Xiomara Shrestha MD   multivitamin tablet Take 1 tablet by mouth once daily.    Historical Provider, MD   pantoprazole (ProtoNix) 40 mg EC tablet Take 1 tablet (40 mg) by mouth once daily in the morning. Take before meals. Do not  crush, chew, or split. 8/12/24 8/12/25  Xiomara Shrestha MD   semaglutide (Rybelsus) 14 mg tablet tablet Take 1 tablet (14 mg) by mouth once daily. 11/12/24   Erinn Espinoza MD       Allergies:  Bee venom protein (honey bee), Adhesive tape-silicones, Mold, and Penicillins    Objective   Physical exam:  Constitutional: Well developed, well nourished 64 y.o. year old in no acute distress. Ambulates with walker  Skin: Warm and dry. Normal turgor. No rash, ulcer, trauma, jaundice.   Eyes: Pupils symmetric and reactive to light.  Respiratory: Clear to auscultation bilaterally. No wheezes, rhonchi, or rales heard.  Cardiovascular: Regular rate and rhythm. S1 and S2 appreciated and normal. No murmur, rub, or gallop heard.   Edema: No edema noted.  GI: Normal bowel sounds. Soft, non-distended, non-tender. No rebound or guarding present. No hepatomegaly or splenomegaly appreciated. Abdominal aorta not palpably abnormal.  Musculoskeletal: Limbs and Joints grossly normal. Full range of motion in major joint.   Neuro: Alert and oriented x 3. Cranial nerves 2-12 grossly intact.   Psych: Normal mood and affect.        Relevant Results Recent labs reviewed in the EMR.    Radiology: Reviewed imaging reviewed in the EMR.  No results found.    Assessment/Plan   Problem List Items Addressed This Visit             ICD-10-CM    Colon cancer screening - Primary Z12.11     Patient has never had a colonoscopy before. Is asymptomatic. Discussed colonoscopy and prep. Will proceed with colonoscopy and golytely prep. Discussed with patient that she will need to hold aspirin 325mg x 7 days and oral semaglutide x 1 dose.         Relevant Medications    polyethylene glycol (Golytely) 236-22.74-6.74 -5.86 gram solution    Other Relevant Orders    Colonoscopy Screening; Average Risk Patient       Meds to hold: aspirin 325mg x 1 week, Angelas   Justa Vides PA-C

## 2024-12-02 ENCOUNTER — APPOINTMENT (OUTPATIENT)
Dept: GASTROENTEROLOGY | Facility: CLINIC | Age: 64
End: 2024-12-02
Payer: MEDICARE

## 2024-12-02 VITALS
WEIGHT: 257 LBS | SYSTOLIC BLOOD PRESSURE: 137 MMHG | OXYGEN SATURATION: 97 % | HEIGHT: 65 IN | BODY MASS INDEX: 42.82 KG/M2 | HEART RATE: 77 BPM | DIASTOLIC BLOOD PRESSURE: 70 MMHG

## 2024-12-02 DIAGNOSIS — Z12.11 COLON CANCER SCREENING: Primary | ICD-10-CM

## 2024-12-02 PROCEDURE — 3061F NEG MICROALBUMINURIA REV: CPT | Performed by: PHYSICIAN ASSISTANT

## 2024-12-02 PROCEDURE — 1036F TOBACCO NON-USER: CPT | Performed by: PHYSICIAN ASSISTANT

## 2024-12-02 PROCEDURE — 3075F SYST BP GE 130 - 139MM HG: CPT | Performed by: PHYSICIAN ASSISTANT

## 2024-12-02 PROCEDURE — 3048F LDL-C <100 MG/DL: CPT | Performed by: PHYSICIAN ASSISTANT

## 2024-12-02 PROCEDURE — 99204 OFFICE O/P NEW MOD 45 MIN: CPT | Performed by: PHYSICIAN ASSISTANT

## 2024-12-02 PROCEDURE — 3078F DIAST BP <80 MM HG: CPT | Performed by: PHYSICIAN ASSISTANT

## 2024-12-02 PROCEDURE — 3044F HG A1C LEVEL LT 7.0%: CPT | Performed by: PHYSICIAN ASSISTANT

## 2024-12-02 PROCEDURE — 3008F BODY MASS INDEX DOCD: CPT | Performed by: PHYSICIAN ASSISTANT

## 2024-12-02 RX ORDER — POLYETHYLENE GLYCOL 3350, SODIUM SULFATE ANHYDROUS, SODIUM BICARBONATE, SODIUM CHLORIDE, POTASSIUM CHLORIDE 236; 22.74; 6.74; 5.86; 2.97 G/4L; G/4L; G/4L; G/4L; G/4L
4 POWDER, FOR SOLUTION ORAL ONCE
Qty: 4000 ML | Refills: 0 | Status: SHIPPED | OUTPATIENT
Start: 2024-12-02 | End: 2024-12-02

## 2024-12-02 NOTE — ASSESSMENT & PLAN NOTE
Patient has never had a colonoscopy before. Is asymptomatic. Discussed colonoscopy and prep. Will proceed with colonoscopy and golytely prep. Discussed with patient that she will need to hold aspirin 325mg x 7 days and oral semaglutide x 1 dose.

## 2024-12-02 NOTE — PATIENT INSTRUCTIONS
Thank you for coming in regarding a colonoscopy. Because of your age being greater than 45, we recommend a screening colonoscopy to check for colon cancer or polyps. Please let us know if you have any questions about the preparation or procedure. Your laxative has been sent to your pharmacy. Call [phone] with questions or concerns.    You will need to hold your Rybelsus x 1 dose and aspirin x 1 week

## 2024-12-13 ENCOUNTER — PREP FOR PROCEDURE (OUTPATIENT)
Facility: CLINIC | Age: 64
End: 2024-12-13
Payer: MEDICARE

## 2024-12-16 ENCOUNTER — ANESTHESIA EVENT (OUTPATIENT)
Dept: GASTROENTEROLOGY | Facility: HOSPITAL | Age: 64
End: 2024-12-16
Payer: MEDICARE

## 2024-12-17 ENCOUNTER — ANESTHESIA (OUTPATIENT)
Dept: GASTROENTEROLOGY | Facility: HOSPITAL | Age: 64
End: 2024-12-17
Payer: MEDICARE

## 2024-12-17 ENCOUNTER — HOSPITAL ENCOUNTER (OUTPATIENT)
Dept: GASTROENTEROLOGY | Facility: HOSPITAL | Age: 64
Discharge: HOME | End: 2024-12-17
Payer: MEDICARE

## 2024-12-17 VITALS
BODY MASS INDEX: 42.49 KG/M2 | HEIGHT: 65 IN | TEMPERATURE: 97.8 F | WEIGHT: 255 LBS | SYSTOLIC BLOOD PRESSURE: 118 MMHG | DIASTOLIC BLOOD PRESSURE: 74 MMHG | OXYGEN SATURATION: 97 % | RESPIRATION RATE: 16 BRPM | HEART RATE: 68 BPM

## 2024-12-17 DIAGNOSIS — Z12.11 COLON CANCER SCREENING: ICD-10-CM

## 2024-12-17 PROCEDURE — 3700000001 HC GENERAL ANESTHESIA TIME - INITIAL BASE CHARGE

## 2024-12-17 PROCEDURE — 3700000002 HC GENERAL ANESTHESIA TIME - EACH INCREMENTAL 1 MINUTE

## 2024-12-17 PROCEDURE — G0121 COLON CA SCRN NOT HI RSK IND: HCPCS | Performed by: INTERNAL MEDICINE

## 2024-12-17 PROCEDURE — 45378 DIAGNOSTIC COLONOSCOPY: CPT | Performed by: INTERNAL MEDICINE

## 2024-12-17 PROCEDURE — 2500000004 HC RX 250 GENERAL PHARMACY W/ HCPCS (ALT 636 FOR OP/ED)

## 2024-12-17 PROCEDURE — 7100000010 HC PHASE TWO TIME - EACH INCREMENTAL 1 MINUTE

## 2024-12-17 PROCEDURE — 7100000009 HC PHASE TWO TIME - INITIAL BASE CHARGE

## 2024-12-17 RX ORDER — DEXMEDETOMIDINE IN 0.9 % NACL 20 MCG/5ML
SYRINGE (ML) INTRAVENOUS AS NEEDED
Status: DISCONTINUED | OUTPATIENT
Start: 2024-12-17 | End: 2024-12-17

## 2024-12-17 RX ORDER — PROPOFOL 10 MG/ML
INJECTION, EMULSION INTRAVENOUS AS NEEDED
Status: DISCONTINUED | OUTPATIENT
Start: 2024-12-17 | End: 2024-12-17

## 2024-12-17 SDOH — HEALTH STABILITY: MENTAL HEALTH: CURRENT SMOKER: 0

## 2024-12-17 ASSESSMENT — PAIN SCALES - GENERAL
PAIN_LEVEL: 0
PAINLEVEL_OUTOF10: 0 - NO PAIN
PAINLEVEL_OUTOF10: 0 - NO PAIN
PAINLEVEL_OUTOF10: 2
PAINLEVEL_OUTOF10: 0 - NO PAIN

## 2024-12-17 ASSESSMENT — PAIN - FUNCTIONAL ASSESSMENT
PAIN_FUNCTIONAL_ASSESSMENT: 0-10

## 2024-12-17 ASSESSMENT — COLUMBIA-SUICIDE SEVERITY RATING SCALE - C-SSRS
6. HAVE YOU EVER DONE ANYTHING, STARTED TO DO ANYTHING, OR PREPARED TO DO ANYTHING TO END YOUR LIFE?: NO
1. IN THE PAST MONTH, HAVE YOU WISHED YOU WERE DEAD OR WISHED YOU COULD GO TO SLEEP AND NOT WAKE UP?: NO
2. HAVE YOU ACTUALLY HAD ANY THOUGHTS OF KILLING YOURSELF?: NO

## 2024-12-17 NOTE — ANESTHESIA POSTPROCEDURE EVALUATION
Patient: Elysia Zuniga    Procedure Summary       Date: 12/17/24 Room / Location: [facility]    Anesthesia Start: 0829 Anesthesia Stop: 0848    Procedure: COLONOSCOPY Diagnosis: Colon cancer screening    Scheduled Providers: Chaim Estrada MD Responsible Provider: JEN Mcclain    Anesthesia Type: MAC ASA Status: 3            Anesthesia Type: MAC    Vitals Value Taken Time   /74 12/17/24 0900   Temp 36.9 °C (98.5 °F) 12/17/24 0850   Pulse 65 12/17/24 0900   Resp 16 12/17/24 0900   SpO2 95 % 12/17/24 0900       Anesthesia Post Evaluation    Patient location during evaluation: bedside  Patient participation: complete - patient participated  Level of consciousness: awake  Pain score: 0  Pain management: adequate  Airway patency: patent  Cardiovascular status: acceptable  Respiratory status: acceptable  Hydration status: acceptable  Postoperative Nausea and Vomiting: none        There were no known notable events for this encounter.

## 2024-12-17 NOTE — ANESTHESIA PREPROCEDURE EVALUATION
Patient: Elysia Zuniga    Procedure Information       Date/Time: 12/17/24 0830    Scheduled providers: Chaim Estrada MD    Procedure: COLONOSCOPY    Location:  [facility]            Relevant Problems   Cardiac   (+) Mixed hyperlipidemia   (+) Right bundle branch block      Neuro   (+) Major depressive disorder, single episode, moderate (Multi)      GI   (+) Gastroesophageal reflux disease with esophagitis      Endocrine   (+) Type 2 diabetes mellitus without complication, without long-term current use of insulin (Multi)      Musculoskeletal   (+) Spinal stenosis of lumbar region without neurogenic claudication       Clinical information reviewed:   Tobacco  Allergies  Meds   Med Hx  Surg Hx   Fam Hx  Soc Hx        NPO Detail:  NPO/Void Status  Date of Last Liquid: 12/17/24  Time of Last Liquid: 0600 (sip)  Date of Last Solid: 12/15/24  Time of Last Solid: 1800  Last Intake Type: Clear fluids         Physical Exam    Airway  Mallampati: III  TM distance: >3 FB  Neck ROM: full     Cardiovascular - normal exam  Rhythm: regular     Dental - normal exam     Pulmonary - normal exam     Abdominal - normal exam             Anesthesia Plan    History of general anesthesia?: yes  History of complications of general anesthesia?: no    ASA 3     MAC     The patient is not a current smoker.  Patient was not previously instructed to abstain from smoking on day of procedure.  Patient did not smoke on day of procedure.    Anesthetic plan and risks discussed with patient.  Use of blood products discussed with patient who.

## 2024-12-18 NOTE — ADDENDUM NOTE
Encounter addended by: Marlena Tamez RN on: 12/18/2024 10:46 AM   Actions taken: Contacts section saved, Flowsheet accepted

## 2025-01-16 ENCOUNTER — APPOINTMENT (OUTPATIENT)
Dept: OTOLARYNGOLOGY | Facility: CLINIC | Age: 65
End: 2025-01-16
Payer: MEDICARE

## 2025-01-16 ENCOUNTER — EVALUATION (OUTPATIENT)
Dept: SPEECH THERAPY | Facility: CLINIC | Age: 65
End: 2025-01-16
Payer: MEDICARE

## 2025-01-16 VITALS — BODY MASS INDEX: 41.32 KG/M2 | HEIGHT: 65 IN | WEIGHT: 248 LBS

## 2025-01-16 DIAGNOSIS — R49.0 DYSPHONIA: Primary | ICD-10-CM

## 2025-01-16 DIAGNOSIS — R47.89 BREATHY VOICE QUALITY: ICD-10-CM

## 2025-01-16 DIAGNOSIS — R49.8 OTHER VOICE AND RESONANCE DISORDERS: ICD-10-CM

## 2025-01-16 DIAGNOSIS — J38.3 VOCAL FOLD SCAR: ICD-10-CM

## 2025-01-16 DIAGNOSIS — R49.0 MUSCLE TENSION DYSPHONIA: ICD-10-CM

## 2025-01-16 DIAGNOSIS — R49.0 VOICE HOARSENESS: Primary | ICD-10-CM

## 2025-01-16 PROCEDURE — 92524 BEHAVRAL QUALIT ANALYS VOICE: CPT | Mod: GN | Performed by: SPEECH-LANGUAGE PATHOLOGIST

## 2025-01-16 PROCEDURE — 31579 LARYNGOSCOPY TELESCOPIC: CPT | Performed by: OTOLARYNGOLOGY

## 2025-01-16 PROCEDURE — 31573 LARGSC W/THER INJECTION: CPT | Performed by: OTOLARYNGOLOGY

## 2025-01-16 PROCEDURE — 99213 OFFICE O/P EST LOW 20 MIN: CPT | Performed by: OTOLARYNGOLOGY

## 2025-01-16 ASSESSMENT — PAIN SCALES - GENERAL: PAINLEVEL_OUTOF10: 0 - NO PAIN

## 2025-01-16 ASSESSMENT — PAIN - FUNCTIONAL ASSESSMENT: PAIN_FUNCTIONAL_ASSESSMENT: 0-10

## 2025-01-16 NOTE — PROGRESS NOTES
ASSESSMENT AND PLAN:   Elysia Zuniga is a 64 y.o. 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 Stroboscopy performed,  She was injected with 0.4 ml decadron 10 mg/ml. She will follow up with me after working with SLP in ~ 3 months.         Reason For Consult  No chief complaint on file.       HISTORY OF PRESENT ILLNESS:  Elysia Zuniga is a 64 y.o. female presenting for a follow up visit with me for a right vocal cord scar.      The patient reports that there is mild improvement in her voice. She is interested in a vocal cord injection to the vocal cord.         Prior History:   Seen last on 07/18/2024 for voice changes after surgery and noted ot have a stiff right vocal cord.     With SLP: Cough has subsided. She is very pleased. Voice has continued to improve with patient encouraged with better voicing. Rasp has improved with greater vocal flexibility. Her range has increased.     Hoarse voice post hip surgery  - The right vocal cord appears a little stiff with a mild erythema, possibly due to some injury that occurred during her procedure. The left vocal cord has more vibration compared to the right side. The muscles are working fine, there's no paralysis.  - Recommend working with a speech and language pathologist on some exercises to get the vocal cords moving. If that is not successful, there are a number of other techniques, procedures that can be done to help get more vibration there. The patient agreed to see a speech pathologist.     Consider steroid injection in office.          Past Medical History  She has a past medical history of Acute lumbar radiculopathy (08/16/2023), Arthritis, De Quervain's tenosynovitis (08/16/2023), Depression, GERD (gastroesophageal reflux disease), Obesity, Class III, BMI 40-49.9 (morbid obesity) (Multi) (11/30/2023), Pain of left hip joint (09/11/2023), Pain of right lower extremity (06/05/2018), Pre-op evaluation (11/30/2023), Shortness of breath  (01/22/2024), and Spinal stenosis of lumbar region without neurogenic claudication (08/16/2023). Surgical History  She has a past surgical history that includes Nose surgery; Endometrial ablation; and Hip Arthroplasty (Right, 12/14/2023).   Social History  She reports that she has quit smoking. Her smoking use included cigarettes. She started smoking about 46 years ago. She has a 46 pack-year smoking history. She has been exposed to tobacco smoke. She has never used smokeless tobacco. She reports current alcohol use of about 2.0 standard drinks of alcohol per week. She reports that she does not currently use drugs after having used the following drugs: Marijuana. Allergies  Bee venom protein (honey bee), Adhesive tape-silicones, Mold, and Penicillins     Family History  Family History   Problem Relation Name Age of Onset    Hypertension Mother      Heart disease Mother      Prostate cancer Father      Colon cancer Mother's Brother  80 - 89    Heart disease Maternal Grandmother      Diabetes Maternal Grandmother      Asthma Maternal Grandfather      COPD Maternal Grandfather      Heart disease Paternal Grandfather      Breast cancer Neg Hx         Review of Systems  All 10 systems were reviewed and negative except for above.      Last Recorded Vitals  There were no vitals taken for this visit.    Physical Exam  ENT Physical Exam  Constitutional  Appearance: patient appears well-developed and well-nourished,  Head and Face  Appearance: head appears normal and face appears normal;  Ear  Auricles: right auricle normal; left auricle normal;  Nose  External Nose: nares patent bilaterally;  Oral Cavity/Oropharynx  Lips: normal;  Neck  Neck: neck normal; neck palpation normal;  Respiratory  Inspection: no retractions visible;  Cardiovascular  Inspection: no peripheral edema present;  Neurovestibular  Mental Status: alert and oriented;  Psychiatric: mood normal;  Cranial Nerves: cranial nerves intact;             Procedures      We discussed the risks, benefits, and alternatives of intervention to include but not be limited to, bleeding and infection, damage to surrounding structures, as well as scarring. We further discussed the possibility of change in voice with persistent or worsened hoarseness, swallowing difficulty, breathing difficulty, medical complications, and risks of anesthesia.?Consent was signed and time out was performed.     Procedure Note:     Procedure: Endoscopic vocal cord injection - Unilateral (Right)  Diagnosis: Right vocal cord scar  Injection material:Decadron  Technique: Cricothyroid with FFL scope guidance    After informed consent, a time out was performed, appropriate topical anesthesia was applied to the nares and throat, and the channeled endoscope was advanced. The topical 4% lidocaine gargle was applied to the vocal cords.     A 25g needle was advanced via the cricothyroid space and angled to the affected vocal fold. The injection was placed and was completed with the following details:     0.4 ml to the right anterior vocal cord - superficial    -Good injection of vocal cord   -Patient tolerated the procedure well.     Flexible Laryngoscopy w/ Videostroboscopy    VOICE AND SPEECH CHARACTERISTICS:  Normal spoken speech, (+) moderate dysphonia, (+) mild roughness, (+) mild breathiness, (+) mild asthenia, (+) mild strain.    Intelligibility: normal.   Resonance: balanced.   Vocal Loudness: reduced.   Breath Support: normal.    PROCEDURE:    Indications: voice change  PROCEDURE NOTE: FLEXIBLE LARYNGOSCOPY WITH STROBOSCOPY  I recommended a flexible laryngoscopy with stroboscopy based on PE findings, and/or concern for mucosal wave details based upon history and/or for issues associated with hyperreflexic gag on mirror exam concerning for pathology. Risks, benefits, and alternatives were explained. The patient wishes to proceed and gives verbal consent.   Patient is seated in the exam chair. After adequate  topical anesthesia, I advance the flexible endoscope. The examination included evaluation of the babin, vallecula, base of tongue, pyriforms, post-cricoid area, larynx and immediate subglottis.  Findings : assessment of the nasopharynx, base of tongue/vallecula, pyriform sinuses, post-cricoid area and pharyngeal walls was without lesion or mass, pharyngeal wall contraction is normal and symmetric, and no pooling of secretions  ventricular obliteration: 2 (present), erythema/hyperemia: 4 (complete), IA thickenin (mild), and TVC edema: 1 (mild)  Gross Arytenoid Movement: symmetric.  Arytenoid Height: normal.   Supraglottic Tension: lateral.  Symmetry: asymmetry.   Amplitude: reduced: right.  Phase Closure: in-phase.  Mucosal Wave Lateral Excursion/Secondary Wave: Right Vocal Cord: mild restriction - Wave moved more than ¼, less than ½ the width of the vocal fold.  Periodicity: normal.  Closure: closed.      Time Spent  Prep time on day of patient encounter: 10 minutes  Time spent directly with patient, family or caregiver: 15 minutes  Additional Time Spent on Patient Care Activities/Discussion with SLP re care plan: 5 minutes  Documentation Time: 10 minutes  Other Time Spent: 0 minutes  Total: 40 minutes       Scribe Attestation  By signing my name below, Carmen YI , [employer] attest that this documentation has been prepared under the direction and in the presence of Rex Bates MD.

## 2025-01-16 NOTE — PROGRESS NOTES
Speech-Language Pathology    Voice Evaluation    Patient Name: Elysia Zuniga  MRN: [id number]  Today's Date: 1/16/2025     Time Calculation  Start Time: 0930  Stop Time: 1000  Time Calculation (min): 30 min      Current Problem:  Patient Active Problem List   Diagnosis    Lumbar spondylolysis    Mixed hyperlipidemia    Major depressive disorder, single episode, moderate (Multi)    Spinal stenosis of lumbar region without neurogenic claudication    Type 2 diabetes mellitus without complication, without long-term current use of insulin (Multi)    Well woman exam with routine gynecological exam    Obesity, Class III, BMI 40-49.9 (morbid obesity) (Multi)    Abnormal CXR    Right bundle branch block    Arthritis of hip    Spondylolysis, lumbar region    Chronic cough    Apnea    Lung nodule    Snoring    Chronic laryngitis    Gastroesophageal reflux disease with esophagitis    Allergic rhinitis    Obstructive sleep apnea syndrome    Symptomatic varicose veins, bilateral    Dysphonia    Vocal fold scar    Colon cancer screening       Voice Assessment:   Elysia Zuniga is a 64 y.o. 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     She has done well in voice therapy but has difficulty maintaining her target voicing 2/2 vocal fold restriction. Seen today with persistent scar on the anterior edge of her right VF with recommendations for localized steroid injection. We performed this today which she tolerated well.     Encouraged her to schedule a FUV with me in x2 weeks to return to focused voice therapy.     She as injected with 0.4 ml decadron 10 mg/ml. She will follow up in clinic after completing voice therapy in ~ 3 months.       Overall, patient would benefit from skilled ST in the area of voice order to increase vocal wellness, healthy voicing to foster increased participation and comfort levels at home, work and in the community environment.     Voice Plan of Care:  Frequency: x1/eowk  Duration:  x8 weeks  Number of Visits: 4  Recommendations for therapeutic interventions: Speech/Voice exercises, Vocal hygiene program, Oral resonance techniques  Prognosis: Good  Discussed Plan of Care: Patient, Caregiver/Family, Physician, Discussed risks/benefits with patient/caregiver, Patient/caregiver agreeable with Plan of Care      Subjective   Current Problem:   Patient is a 65 yo female with hoarseness secondary to VF scar. Referred to me by Dr. Bates. She is known to me from a previous EOC targeting laryngeal balancing. She did well in therapy but had difficulty maintaining her target voicing 2/2 VF stiffness. Here today for consideration of steroid injection.     States that voice has been doing fair - she has good days and bad days with her voice. Today she has significant rasp and limited projection. Denies pain. No difficulty with swallow or SOB.     Prior History:   Seen last on 07/18/2024 for voice changes after surgery and noted ot have a stiff right vocal cord.      Past Medical History  She has a past medical history of Acute lumbar radiculopathy (08/16/2023), Arthritis, De Quervain's tenosynovitis (08/16/2023), Depression, GERD (gastroesophageal reflux disease), Obesity, Class III, BMI 40-49.9 (morbid obesity) (Multi) (11/30/2023), Pain of left hip joint (09/11/2023), Pain of right lower extremity (06/05/2018), Pre-op evaluation (11/30/2023), Shortness of breath (01/22/2024), and Spinal stenosis of lumbar region without neurogenic claudication (08/16/2023). Surgical History  She has a past surgical history that includes Nose surgery; Endometrial ablation; and Hip Arthroplasty (Right, 12/14/2023).   Social History  She reports that she has quit smoking. Her smoking use included cigarettes. She started smoking about 46 years ago. She has a 46 pack-year smoking history. She has been exposed to tobacco smoke. She has never used smokeless tobacco. She reports current alcohol use of about 2.0 standard drinks of  alcohol per week. She reports that she does not currently use drugs after having used the following drugs: Marijuana. Allergies  Bee venom protein (honey bee), Adhesive tape-silicones, Mold, and Penicillins       General Visit Information:  Patient Class: Outpatient  Living Environment: Home  Arrival: Independent (+cane)  Ordering Physician: Dr. Bates  Reason for Referral: Vocal Fold Injection - steroid    Objective     Pain Assessment:  Pain Assessment  Pain Assessment: 0-10  0-10 (Numeric) Pain Score: 0 - No pain    Voice Use Inventory:  Voice misuse/abuse: None  Exposure to Noise: No  Exposure to respiratory irritants: No  Consistent use of singing voice: No  Occupation relies on speaking voice: No    Patient Self Assessment:  Daily water intake: Yes  Daily caffeine intake: Yes  Alcohol intake: Yes  Smoking history: No  Reflux history: Yes    Voice Objective:  Motor Speech Production: WFL  Pitch: Inadequate range  Voice Quality: Hoarse, Harsh  Fluency: WFL  Prosody: Insufficient variation  Resonance: WFL  Loudness: Inadequate range, Hypophonia        Voice Analysis:   Procedures      We discussed the risks, benefits, and alternatives of intervention to include but not be limited to, bleeding and infection, damage to surrounding structures, as well as scarring. We further discussed the possibility of change in voice with persistent or worsened hoarseness, swallowing difficulty, breathing difficulty, medical complications, and risks of anesthesia.?Consent was signed and time out was performed.      Procedure Note:      Procedure: Endoscopic vocal cord injection - Unilateral (Right)  Diagnosis: Right vocal cord scar  Injection material:Decadron  Technique: Cricothyroid      After informed consent, a time out was performed, appropriate topical anesthesia was applied to the nares and throat, and the channeled endoscope was advanced. The topical 4% lidocaine gargle was applied to the vocal cords. SLP acquired control  of endoscope to assist with visualization of injection site, patient cuing and post-injection voice assessment.        A 25g needle was advanced via the cricothyroid space and angled to the affected vocal fold. The injection was placed lateral to the vocal ligament and was completed with the following details:      0.4 ml to the right anterior vocal cord     -good medialization of vocal cord with improved contralateral contact and improved voice.      Patient tolerated the procedure well.      Flexible Laryngoscopy w/ Videostroboscopy     VOICE AND SPEECH CHARACTERISTICS:  Normal spoken speech, (+) moderate dysphonia, (+) mild roughness, (+) mild breathiness, (+) mild asthenia, (+) mild strain.     Intelligibility: normal.   Resonance: balanced.   Vocal Loudness: reduced.   Breath Support: normal.     PROCEDURE:    Indications: voice change  PROCEDURE NOTE: FLEXIBLE LARYNGOSCOPY WITH STROBOSCOPY  I recommended a flexible laryngoscopy with stroboscopy based on PE findings, and/or concern for mucosal wave details based upon history and/or for issues associated with hyperreflexic gag on mirror exam concerning for pathology. Risks, benefits, and alternatives were explained. The patient wishes to proceed and gives verbal consent.   Patient is seated in the exam chair. After adequate topical anesthesia, I advance the flexible endoscope. The examination included evaluation of the babin, vallecula, base of tongue, pyriforms, post-cricoid area, larynx and immediate subglottis.  Findings : assessment of the nasopharynx, base of tongue/vallecula, pyriform sinuses, post-cricoid area and pharyngeal walls was without lesion or mass, pharyngeal wall contraction is normal and symmetric, and no pooling of secretions  ventricular obliteration: 2 (present), erythema/hyperemia: 4 (complete), IA thickenin (mild), and TVC edema: 1 (mild)  Gross Arytenoid Movement: symmetric.  Arytenoid Height: normal.   Supraglottic Tension:  lateral.  Symmetry: asymmetry.   Amplitude: reduced: right.  Phase Closure: in-phase.  Mucosal Wave Lateral Excursion/Secondary Wave: Right Vocal Cord: mild restriction - Wave moved more than ¼, less than ½ the width of the vocal fold.  Periodicity: normal.  Closure: closed.    Voice Treatment:  Individual(s) Educated: Patient  Verbal Education: Risks/benefits of therapy, Results of testing, Communication strategies, Other: (comment)  Response to Education: Verbalized understanding, Demonstrated understanding, Needs review  Patient/Caregiver Verbalized Understanding and Agreement: Yes      OP Education:  Adult Outpatient Education  Individual(s) Educated: Patient  Risk and Benefits Discussed with Patient/Caregiver/Other: yes  Patient/Caregiver Demonstrated Understanding: yes  Plan of Care Discussed and Agreed Upon: yes  Patient Response to Education: Patient/Caregiver Verbalized Understanding of Information, Patient/Caregiver Performed Return Demonstration of Exercises/Activities, Patient/Caregiver Asked Appropriate Questions       Active       Voice       SLP Goal 1 (Progressing)       Start:  08/13/24    Expected End:  04/16/25       Patient will increase vocal wellness and decrease phonotrauma in adherence with clinician prescribed vocal hygiene and wellness program per patient report.          SLP Goal 2 (Progressing)       Start:  08/13/24    Expected End:  04/16/25       Patient will increase ability to produce voice without tension within 5 minute conversational task x80% accuracy as judged by clinician observation and/or patient report.          SLP Goal 3 (Progressing)       Start:  08/13/24    Expected End:  04/16/25       Patient will demonstrate independent use of voice/swallow/breathing techniques x80% accuracy.             Time In: 0930  Time Out: 1000

## 2025-01-17 RX ORDER — DEXAMETHASONE SODIUM PHOSPHATE 10 MG/ML
10 INJECTION INTRAMUSCULAR; INTRAVENOUS ONCE
Status: SHIPPED | OUTPATIENT
Start: 2025-01-17

## 2025-01-17 RX ORDER — LIDOCAINE HYDROCHLORIDE AND EPINEPHRINE 10; 10 UG/ML; MG/ML
1 INJECTION, SOLUTION INFILTRATION; PERINEURAL ONCE
Status: SHIPPED | OUTPATIENT
Start: 2025-01-17

## 2025-02-17 NOTE — PROGRESS NOTES
Clinical Pharmacy Appointment    Patient ID: Elysia Zuniga is a 64 y.o. female who presents for Diabetes    Pt is here for Follow Up    Referring Provider: Erinn Quintana MD  PCP: None (Dr. Dariusz Espinoza to cover)  Last visit with PCP: 10/16/2024   Next visit with PCP: 4/1/2025      Subjective     Interval History  Dose increased to Rybelsus 14 mg    HPI  Diabetes  Current  Pharmacotherapy:    Rybelsus 14 mg daily     SECONDARY PREVENTION  - Statin? Atorvastatin 20 mg  - ACE-I/ARB? No   - Aspirin? Yes    The 10-year ASCVD risk score (Andrew DIAZ, et al., 2019) is: 8%    Values used to calculate the score:      Age: 64 years      Sex: Female      Is Non- : No      Diabetic: Yes      Tobacco smoker: No      Systolic Blood Pressure: 118 mmHg      Is BP treated: No      HDL Cholesterol: 43.4 mg/dL      Total Cholesterol: 158 mg/dL       Current monitoring regimen:   Patient is using: finger sticks, intermittently      SMBG Fasting Readings:   AM  ~110-120 nighttime      [place] Application    Medication(s): Rybelsus    Application response: [Approved]    Approved until: 6/10/2025    Additional information:  - Patient will get this prescription mailed to them from  pharmacies to receive medication at no cost  - Patient will need to re-enroll in this program prior to expiration date to maintain coverage      Objective   Allergies   Allergen Reactions    Bee Venom Protein (Honey Bee) Other and Shortness of breath    Adhesive Tape-Silicones Hives    Mold Unknown    Penicillins Hives, Itching, Other and Swelling     Hives sob     Social History     Social History Narrative    Not on file      Medication Review  Current Outpatient Medications   Medication Instructions    ascorbic acid (VITAMIN C) 1,000 mg, Continuous PRN    aspirin 325 mg, Daily    atorvastatin (LIPITOR) 40 mg, oral, Daily    b complex 0.4 mg tablet 1 tablet, Daily    buPROPion XL (WELLBUTRIN XL) 150 mg, oral, Every  morning, Do not crush, chew, or split.    busPIRone (BUSPAR) 15 mg, oral, 3 times daily PRN    cetirizine (ZYRTEC) 10 mg    escitalopram (LEXAPRO) 10 mg, oral, Daily    famotidine (PEPCID) 40 mg, oral, Nightly    fluticasone (Flonase) 50 mcg/actuation nasal spray instill 2 sprays into each nostril once daily. shake gently. before first use, prime pump. after use, clean tip and replace cap.    FreeStyle lancets 28 gauge Use as instructed to test 3 times weekly    lysine 500 mg tablet 500 tablets, Once daily (morning) M-F (5 days a week)    melatonin 10 mg tablet,chewable Nightly    montelukast (SINGULAIR) 10 mg, oral, Nightly    multivitamin tablet 1 tablet, Daily    pantoprazole (PROTONIX) 40 mg, oral, Daily before breakfast, Do not crush, chew, or split.    Rybelsus 14 mg, oral, Daily      Vitals  BP Readings from Last 2 Encounters:   12/17/24 118/74   12/02/24 137/70     BMI Readings from Last 1 Encounters:   01/16/25 41.27 kg/m²      Labs  A1C  Lab Results   Component Value Date    HGBA1C 5.9 (H) 10/16/2024    HGBA1C 6.2 (H) 07/19/2024    HGBA1C 6.8 (H) 04/18/2024     BMP  Lab Results   Component Value Date    CALCIUM 9.1 10/16/2024     10/16/2024    K 4.2 10/16/2024    CO2 27 10/16/2024     10/16/2024    BUN 14 10/16/2024    CREATININE 0.62 10/16/2024    EGFR >90 10/16/2024     LFTs  Lab Results   Component Value Date    ALT 11 10/16/2024    AST 15 10/16/2024    ALKPHOS 78 10/16/2024    BILITOT 0.9 10/16/2024     FLP  Lab Results   Component Value Date    TRIG 196 (H) 10/16/2024    CHOL 158 10/16/2024    LDLF 152 (H) 04/27/2022    LDLCALC 75 10/16/2024    HDL 43.4 10/16/2024     Urine Microalbumin  Lab Results   Component Value Date    MICROALBCREA 7.2 07/19/2024     Weight Management  Wt Readings from Last 3 Encounters:   01/16/25 112 kg (248 lb)   12/17/24 116 kg (255 lb)   12/02/24 117 kg (257 lb)      There is no height or weight on file to calculate BMI.     Assessment/Plan   Problem List Items  Addressed This Visit       Mixed hyperlipidemia    Relevant Orders    Referral to Clinical Pharmacy    Type 2 diabetes mellitus without complication, without long-term current use of insulin (Multi)     Patient's diabetes is currently controlled, with A1c of 6.2%    Patient continued to do well on Rybelsus. She admits that weight loss has plateaued. Her blood sugars have continued to be stable.     She is due for updated labs with next PCP follow up. She is agreeable. Based on labs, will be able to extend our follow ups.     Plan:  CONTINUE  Rybelsus 14 mg daily  Rx to [facility] for PAP         Relevant Orders    Comprehensive metabolic panel    Hemoglobin A1c    Lipid Panel    Referral to Clinical Pharmacy     Other Visit Diagnoses       Major depressive disorder, single episode, moderate degree (Multi)        Relevant Medications    buPROPion XL (Wellbutrin XL) 150 mg 24 hr tablet            Clinical Pharmacist follow-up: 4/8 @1:20 PM, Telehealth visit    Continue all meds under the continuation of care with the referring provider and clinical pharmacy team.    Thank you,  Alexandria Linda, PharmD  Clinical Pharmacy Specialist     Verbal consent to manage patient's drug therapy was obtained from the patient. They were informed they may decline to participate or withdraw from participation in pharmacy services at any time.

## 2025-02-18 ENCOUNTER — APPOINTMENT (OUTPATIENT)
Dept: PHARMACY | Facility: HOSPITAL | Age: 65
End: 2025-02-18
Payer: MEDICARE

## 2025-02-18 DIAGNOSIS — F32.1 MAJOR DEPRESSIVE DISORDER, SINGLE EPISODE, MODERATE DEGREE (MULTI): ICD-10-CM

## 2025-02-18 DIAGNOSIS — E78.2 MIXED HYPERLIPIDEMIA: ICD-10-CM

## 2025-02-18 DIAGNOSIS — E11.9 TYPE 2 DIABETES MELLITUS WITHOUT COMPLICATION, WITHOUT LONG-TERM CURRENT USE OF INSULIN (MULTI): ICD-10-CM

## 2025-02-18 RX ORDER — BUPROPION HYDROCHLORIDE 150 MG/1
150 TABLET ORAL EVERY MORNING
Qty: 30 TABLET | Refills: 1 | Status: SHIPPED | OUTPATIENT
Start: 2025-02-18 | End: 2026-02-18

## 2025-02-26 NOTE — ASSESSMENT & PLAN NOTE
Patient's diabetes is currently controlled, with A1c of 6.2%    Patient continued to do well on Rybelsus. She admits that weight loss has plateaued. Her blood sugars have continued to be stable.     She is due for updated labs with next PCP follow up. She is agreeable. Based on labs, will be able to extend our follow ups.     Plan:  CONTINUE  Rybelsus 14 mg daily  Rx to  [facility] for PAP

## 2025-03-03 ENCOUNTER — APPOINTMENT (OUTPATIENT)
Dept: PULMONOLOGY | Facility: HOSPITAL | Age: 65
End: 2025-03-03
Payer: MEDICARE

## 2025-03-12 ENCOUNTER — OFFICE VISIT (OUTPATIENT)
Dept: PULMONOLOGY | Facility: HOSPITAL | Age: 65
End: 2025-03-12
Payer: MEDICARE

## 2025-03-12 VITALS
TEMPERATURE: 96.8 F | BODY MASS INDEX: 41.32 KG/M2 | DIASTOLIC BLOOD PRESSURE: 70 MMHG | RESPIRATION RATE: 16 BRPM | HEART RATE: 80 BPM | SYSTOLIC BLOOD PRESSURE: 105 MMHG | OXYGEN SATURATION: 94 % | HEIGHT: 65 IN | WEIGHT: 248 LBS

## 2025-03-12 DIAGNOSIS — K21.00 GASTROESOPHAGEAL REFLUX DISEASE WITH ESOPHAGITIS, UNSPECIFIED WHETHER HEMORRHAGE: ICD-10-CM

## 2025-03-12 DIAGNOSIS — J37.0 CHRONIC LARYNGITIS: ICD-10-CM

## 2025-03-12 DIAGNOSIS — R91.1 SOLITARY PULMONARY NODULE: Primary | ICD-10-CM

## 2025-03-12 DIAGNOSIS — J30.9 CHRONIC ALLERGIC RHINITIS: ICD-10-CM

## 2025-03-12 DIAGNOSIS — G47.33 OSA (OBSTRUCTIVE SLEEP APNEA): ICD-10-CM

## 2025-03-12 PROCEDURE — 99214 OFFICE O/P EST MOD 30 MIN: CPT | Performed by: INTERNAL MEDICINE

## 2025-03-12 PROCEDURE — 3078F DIAST BP <80 MM HG: CPT | Performed by: INTERNAL MEDICINE

## 2025-03-12 PROCEDURE — 1036F TOBACCO NON-USER: CPT | Performed by: INTERNAL MEDICINE

## 2025-03-12 PROCEDURE — 3008F BODY MASS INDEX DOCD: CPT | Performed by: INTERNAL MEDICINE

## 2025-03-12 PROCEDURE — 3074F SYST BP LT 130 MM HG: CPT | Performed by: INTERNAL MEDICINE

## 2025-03-12 ASSESSMENT — ENCOUNTER SYMPTOMS
COUGH: 1
FATIGUE: 1

## 2025-03-12 NOTE — PATIENT INSTRUCTIONS
Please avoid spicy food, caffeine and alcohol. Follow antireflux measures for hiatal hernia.  Take pantoprazole 40 mg 1 tablet daily before breakfast and Famotidine 40 mg 1 tablet  daily at bedtime.  Take Montelukast 1 tablet at bedtime and continue to take cetirizine daily in the morning.  Take fluticasone nasal spray 2 sprays in each nostril daily.  Keep follow up with ENT Dr. Bates.  Continue to wear Auto PAP every night.   Please complete CT chest asap to follow up on your lung nodule   I will see you back in 1 year or earlier if needed.

## 2025-03-12 NOTE — PROGRESS NOTES
Subjective   Patient ID: Elysia Zuniga is a 64 y.o. female who presents for Cough (Patient is here for follow up visit. Patient states her breathing is good. Patient states she has dry cough. Patient is using cpap nightly. Patient has no new concerns. ).  Cough      Elysia Zuniga is an 63 y.o. female never smoker, presenting for abnormal CXR findings which were not confirmed by CT.  CXR was done for pre-op evaluation for left hip replacement.  No h/o cardiac or pulmonary issues.  She reports frequent bronchitis in the past but that has not been a problem in the last few years.  She has been told that she snores and stops breathing at times when she sleep.  She is always tired and feels like she does not get good quality sleep.  She does have a chronic cough which is worse in the winter.  It does not bother her but she says sometimes it bothers other people.  It is usually worse in the evening which will come on for a few minute and then stops.  She does have allergic rhinitis.  She takes zertec for allergies which she takes every day.  She does have GERD symptoms and has been taking prilosec and famotidine at home.  She does reports some JORGENSEN with emptying groceries from her car for the past couple years but has been very limited by her hip pain.    She is here for follow up. She reports she is doing well. She is using CPAP everynight. She notes good control of daytime symptoms and is benefiting with PAP. She has had hoarse voice since her hip surgery in Dec 2023 and saw ENT Dr. Bates for evaluation. She had vocal cord injection with some improvement in voice after speech therapy. She states her cough has been mostly controlled now. She does not have SOB. Her PFTs were normal Feb 26, 24. She started APAP in March 2024 and using nightly. She notes sleep quality is better and improvement in EDS and fatigue but some residual symptoms if does not sleep adequately at night. She states her DM and depression are  "under control. She has been trying to lose4 weight and has been on Rybelsus. She reports adherence to therapy with GERD and Allergic rhinitis rx.     Review of Systems   Constitutional:  Positive for fatigue.   Respiratory:  Positive for cough.    All other systems reviewed and are negative.      Objective   Physical Exam  Vitals and nursing note reviewed.   Constitutional:       Appearance: She is obese.   HENT:      Head: Normocephalic.      Comments: Mallampati class IV     Nose: Nose normal.      Mouth/Throat:      Pharynx: Oropharynx is clear.   Eyes:      Extraocular Movements: Extraocular movements intact.      Conjunctiva/sclera: Conjunctivae normal.      Pupils: Pupils are equal, round, and reactive to light.   Cardiovascular:      Rate and Rhythm: Normal rate and regular rhythm.      Pulses: Normal pulses.      Heart sounds: Normal heart sounds.   Pulmonary:      Effort: Pulmonary effort is normal.      Breath sounds: Normal breath sounds.   Abdominal:      General: Bowel sounds are normal.      Palpations: Abdomen is soft.   Musculoskeletal:         General: Normal range of motion.      Cervical back: Normal range of motion.   Skin:     General: Skin is warm.   Neurological:      General: No focal deficit present.      Mental Status: She is alert and oriented to person, place, and time. Mental status is at baseline.   Psychiatric:         Mood and Affect: Mood normal.         Behavior: Behavior normal.       OBJECTIVE:    Vitals:    /70   Pulse 80   Temp 36 °C (96.8 °F)   Resp 16   Ht 1.651 m (5' 5\")   Wt 112 kg (248 lb)   SpO2 94%   BMI 41.27 kg/m²     General Appearance:  Alert, cooperative, no distress or conversational dyspnea, appears stated age  Head:  Normocephalic, without obvious abnormality, atraumatic  Eyes:  PERRL, conjunctiva/corneas clear, EOM's intact  Nose:  Nares normal, septum midline, mucosa normal, no drainage or sinus tenderness  Mouth:  Lips, mucosa, and tongue normal; " teeth and gums normal, no thrush  Neck:  Supple, symmetrical, trachea midline, no cervical, supraclavicular, and axillary adenopathy; no JVD, not using accessory muscles to breath  Lungs:    Clear to auscultation bilaterally, respirations unlabored, good air movement  Chest wall:  No tenderness or deformity, chest expands symmetrically  Heart:  Regular rate and rhythm, S1 and S2 normal, no murmur, rub or gallop  Abdomen:    Soft, non-tender, non-distended, bs active, no masses, no organomegaly  Extremities:  Extremities atraumatic, no edema, no cyanosis or clubbing, 2+ pulses in all extremities  Skin:  Skin color, texture, turgor normal, no rashes or lesions  Neurologic:  CNII-XII intact. Normal strength, sensation      Data:    Labs reviewed in [facility]      Imaging:  Reviewed    CXR 2-view 12/6/23: Possible lingular cavitation with nonspecific thick-walled lesion.  Consider further evaluation with chest CT.      CT chest without IV contrast 12/8/23:   There is a 6 mm noncalcified subpleural nodule in the posterior left  upper lobe and some linear scarring or discoid atelectasis in the  right middle lobe and inferior left lingula but the lungs otherwise  are clear.  No lung cysts are appreciated.  There is a moderate  retrocardiac hiatal hernia.      ASSESSMENT  64yo never smoker with incidentally found 6mm subpleural ARLYN nodule, low risk patient     Malignancy risk by [place]garwaltr 2.4% (Low risk)    ESS 8/24, poor sleep quality, snoring and witness apnea- high risk for sleep apnea    Chronic cough likely from a combination of poorly controlled allergic rhinitis with post nasal drip and GERD        Assessment/Plan   WESLEY  Chronic cough with suspicion of bronchial asthma  Chronic allergic rhinitis  4.   GERD with hiatal hernia  5.   Solitarly pulmonary nodule 6 mm ARLYN  6.   BMI 42.4  7.   Never smoked    Recommendations:  -Discussed sleep study with patient in detail.  Severe sleep apnea and significant sleep-related  hypoxia is noted. We discussed weight management, side-lying position to avoid supine sleep apnea and management of chronic allergic rhinitis. Started APAP in March 2024. I reviewed data and it is satisfactory. She is already trying to lose weight and is on Semaglutide.      -repeat CT chest in Dec 2024 ordered per Dr. Claudio already but not completed. I will order CT chest to follow up on ARLYN lung nodule.    -PFTs normal. Cough variant asthma considered but cough improved with allergic rhinitis and GERD Rx. Defer ICS.   -Optimized GERD treatment with pantoprazole in a.m. and famotidine at bedtime given significant hiatal hernia and cough related to laryngeal pharyngeal reflux. Discussed surgical opinion. She would like to defer it and would continue with weight loss for now.  -Optimized allergic rhinitis as still has significant postnasal drip and to continue montelukast, Flonase and cetirizine. Her recent worsening appears to be more of a Viral URI that other family members had too.   -Referred to ENT for hoarseness of voice since intubation for surgery in Dec 2023 vs chronic laryngitis. She has seen Dr. Bates ENT and has had vocal cord injection  -Received APAP data 2/10/25-3/11/25, APAP 6-16 cwp, AHI 1.0, Pmed 13.3 cwp, P95 15.7 cwp, Pmax 15.9 cwp. Benefiting with PAP and supplies. Recommend change APAP to 12 to 17 cwp.   -She is on semaglutide and will discuss with PCP to adjust dosage.    She is compliant with APAP and is working on weight loss. She is adherent to Rx also for allergic rhinitis and GERD. Continue same and follow up in 1 year. She will complete CT chest asap.

## 2025-03-20 ENCOUNTER — APPOINTMENT (OUTPATIENT)
Dept: OTOLARYNGOLOGY | Facility: CLINIC | Age: 65
End: 2025-03-20
Payer: MEDICARE

## 2025-03-20 VITALS — HEIGHT: 65 IN | TEMPERATURE: 98.6 F | BODY MASS INDEX: 42.82 KG/M2 | WEIGHT: 257 LBS

## 2025-03-20 DIAGNOSIS — J38.7 PRESBYLARYNGES: ICD-10-CM

## 2025-03-20 DIAGNOSIS — R49.0 MUSCLE TENSION DYSPHONIA: Primary | ICD-10-CM

## 2025-03-20 DIAGNOSIS — R47.89 BREATHY VOICE QUALITY: ICD-10-CM

## 2025-03-20 DIAGNOSIS — J38.3 VOCAL FOLD SCAR: ICD-10-CM

## 2025-03-20 DIAGNOSIS — R49.1 VOICE ABSENCE: ICD-10-CM

## 2025-03-20 DIAGNOSIS — J38.01 PARESIS OF LEFT VOCAL CORD: ICD-10-CM

## 2025-03-20 PROCEDURE — 3008F BODY MASS INDEX DOCD: CPT | Performed by: OTOLARYNGOLOGY

## 2025-03-20 PROCEDURE — 31579 LARYNGOSCOPY TELESCOPIC: CPT | Performed by: OTOLARYNGOLOGY

## 2025-03-20 PROCEDURE — 99214 OFFICE O/P EST MOD 30 MIN: CPT | Performed by: OTOLARYNGOLOGY

## 2025-03-20 NOTE — PROGRESS NOTES
ASSESSMENT AND PLAN:   Elysia Zuniga is a 64 y.o. female with a history of a right TVC scar, here for repeat evaluation. She has 50% improvement in voice after steroid injection, but still has fatigue at the end of the day. Notes preexisting fatigue, likely 2/2 presbylarynges.      Today's examination to include stroboscopy demonstrates left TVC presbylarynges. She has a persistent scar and reduced vibration on the right that improves with good airflow.     We discussed working with SLP Gerald. She has done these exercises in the past which were helpful. We discussed injection with a filler to the left if this is not successful in improving the voice.     I would like to see the patient back as needed.     I discussed the case with my Speech-Language Pathologist (SLP) Gerald Hernandez. We jointly reviewed the stroboscopy results, which provided detailed visualization of the patient's vocal fold vibration patterns. Together we collaboratively evaluated treatment options, refined the diagnosis, and identified potential complicating factors based on the stroboscopic findings and patient history. Patient education was provided regarding the condition, proposed interventions, and expected outcomes, ensuring informed decision-making and active patient participation in the treatment process.          Reason For Consult  No chief complaint on file.       HISTORY OF PRESENT ILLNESS:  Elysia Zuniga is a 64 y.o. female presenting for a follow up visit with me for voice changes after hip surgery.  Seen last 7/18/24.  The patient reports improved voice. Her voice is 5/10 better from 1/10. She has not undergone speech therapy.   She has fatigue at the end of the day that improve with overnight rest.     Prior History:   Found to have a scar in the right anterior vocal cord. Stroboscopy performed,  She was injected with 0.4 ml decadron 10 mg/ml. She will follow up with me after working with SLP in ~ 3 months.    Cough has subsided.  Voice has continued to improve with patient encouraged with better voicing. Rasp and range has improved.    Hoarse voice post hip surgery  - The right vocal cord appears a little stiff with a mild erythema, possibly due to some injury that occurred during her procedure. The left vocal cord has more vibration compared to the right side. The muscles are working fine, there's no paralysis.  - Recommend working with a speech and language pathologist on some exercises to get the vocal cords moving. If that is not successful, there are a number of other techniques, procedures that can be done to help get more vibration there. The patient agreed to see a speech pathologist.     Consider steroid injection in office.          Past Medical History  She has a past medical history of Acute lumbar radiculopathy (08/16/2023), Arthritis, De Quervain's tenosynovitis (08/16/2023), Depression, GERD (gastroesophageal reflux disease), Obesity, Class III, BMI 40-49.9 (morbid obesity) (Multi) (11/30/2023), Pain of left hip joint (09/11/2023), Pain of right lower extremity (06/05/2018), Pre-op evaluation (11/30/2023), Shortness of breath (01/22/2024), and Spinal stenosis of lumbar region without neurogenic claudication (08/16/2023). Surgical History  She has a past surgical history that includes Nose surgery; Endometrial ablation; and Hip Arthroplasty (Right, 12/14/2023).   Social History  She reports that she has quit smoking. Her smoking use included cigarettes. She started smoking about 46 years ago. She has a 46.2 pack-year smoking history. She has been exposed to tobacco smoke. She has never used smokeless tobacco. She reports current alcohol use of about 2.0 standard drinks of alcohol per week. She reports that she does not currently use drugs after having used the following drugs: Marijuana. Allergies  Bee venom protein (honey bee), Adhesive tape-silicones, Mold, and Penicillins     Family History  Family History   Problem Relation  Name Age of Onset    Hypertension Mother      Heart disease Mother      Prostate cancer Father      Colon cancer Mother's Brother  80 - 89    Heart disease Maternal Grandmother      Diabetes Maternal Grandmother      Asthma Maternal Grandfather      COPD Maternal Grandfather      Heart disease Paternal Grandfather      Breast cancer Neg Hx         Review of Systems  All 10 systems were reviewed and negative except for above.      Last Recorded Vitals  There were no vitals taken for this visit.    Physical Exam  ENT Physical Exam  Constitutional  Appearance: patient appears well-developed and well-nourished,  Head and Face  Appearance: head appears normal and face appears normal;  Ear  Auricles: right auricle normal; left auricle normal;  Nose  External Nose: nares patent bilaterally;  Oral Cavity/Oropharynx  Lips: normal;  Neck  Neck: neck normal; neck palpation normal;  Respiratory  Inspection: no retractions visible;  Cardiovascular  Inspection: no peripheral edema present;  Neurovestibular  Mental Status: alert and oriented;  Psychiatric: mood normal;  Cranial Nerves: cranial nerves intact;     Procedures   Flexible Laryngoscopy w/ Videostroboscopy    VOICE AND SPEECH CHARACTERISTICS:  Normal spoken speech, (+) mild dysphonia, (+) mild roughness, no breathiness, (+) mild asthenia, (+) mild strain.    Intelligibility: normal.   Resonance: balanced.   Vocal Loudness: normal.   Breath Support: decreased.    PROCEDURE:    Indications: voice change  PROCEDURE NOTE: FLEXIBLE LARYNGOSCOPY WITH STROBOSCOPY  I recommended a flexible laryngoscopy with stroboscopy based on PE findings, and/or concern for mucosal wave details based upon history and/or for issues associated with hyperreflexic gag on mirror exam concerning for pathology. Risks, benefits, and alternatives were explained. The patient wishes to proceed and gives verbal consent.   Patient is seated in the exam chair. After adequate topical anesthesia, I advance  the flexible endoscope. The examination included evaluation of the babin, vallecula, base of tongue, pyriforms, post-cricoid area, larynx and immediate subglottis.  subglottic edema:2 (present), ventricular obliteration: 2 (present), IA thickenin (mild), and TVC edema: 1 (mild)  Gross Arytenoid Movement: symmetric.  Arytenoid Height: normal.   Supraglottic Tension: lateral.  Symmetry: asymmetry.   Amplitude: reduced: right.  Phase Closure: in-phase.  Mucosal Wave Lateral Excursion/Secondary Wave: Right Vocal Cord: moderate restriction - Wave moved up to ¼ the width of the vocal fold.  Periodicity: aperiodic.  Closure: closed.    Time Spent  Prep time on day of patient encounter: 10 minutes  Time spent directly with patient, family or caregiver: 15 minutes  Additional Time Spent on Patient Care Activities/Discussion with SLP re care plan: 5 minutes  Documentation Time: 10 minutes  Other Time Spent: 0 minutes  Total: 40 minutes     Scribe Attestation  By signing my name below, ICarmen , [employer] attest that this documentation has been prepared under the direction and in the presence of Rex Bates MD.

## 2025-03-24 ENCOUNTER — HOSPITAL ENCOUNTER (OUTPATIENT)
Dept: RADIOLOGY | Facility: CLINIC | Age: 65
Discharge: HOME | End: 2025-03-24
Payer: MEDICARE

## 2025-03-24 DIAGNOSIS — R91.1 SOLITARY PULMONARY NODULE: ICD-10-CM

## 2025-03-24 PROCEDURE — 71250 CT THORAX DX C-: CPT

## 2025-03-25 DIAGNOSIS — K21.00 GASTROESOPHAGEAL REFLUX DISEASE WITH ESOPHAGITIS, UNSPECIFIED WHETHER HEMORRHAGE: ICD-10-CM

## 2025-03-25 RX ORDER — FAMOTIDINE 40 MG/1
40 TABLET, FILM COATED ORAL NIGHTLY
Qty: 90 TABLET | Refills: 0 | Status: SHIPPED | OUTPATIENT
Start: 2025-03-25

## 2025-03-27 ENCOUNTER — EVALUATION (OUTPATIENT)
Dept: SPEECH THERAPY | Facility: CLINIC | Age: 65
End: 2025-03-27
Payer: MEDICARE

## 2025-03-27 DIAGNOSIS — J38.3 VOCAL FOLD SCAR: ICD-10-CM

## 2025-03-27 DIAGNOSIS — R49.0 DYSPHONIA: Primary | ICD-10-CM

## 2025-03-27 LAB
ALBUMIN SERPL-MCNC: 4.1 G/DL (ref 3.6–5.1)
ALP SERPL-CCNC: 85 U/L (ref 37–153)
ALT SERPL-CCNC: 10 U/L (ref 6–29)
ANION GAP SERPL CALCULATED.4IONS-SCNC: 9 MMOL/L (CALC) (ref 7–17)
AST SERPL-CCNC: 12 U/L (ref 10–35)
BILIRUB SERPL-MCNC: 0.6 MG/DL (ref 0.2–1.2)
BUN SERPL-MCNC: 20 MG/DL (ref 7–25)
CALCIUM SERPL-MCNC: 9.2 MG/DL (ref 8.6–10.4)
CHLORIDE SERPL-SCNC: 106 MMOL/L (ref 98–110)
CHOLEST SERPL-MCNC: 200 MG/DL
CHOLEST/HDLC SERPL: 4.5 (CALC)
CO2 SERPL-SCNC: 26 MMOL/L (ref 20–32)
CREAT SERPL-MCNC: 0.68 MG/DL (ref 0.5–1.05)
EGFRCR SERPLBLD CKD-EPI 2021: 97 ML/MIN/1.73M2
EST. AVERAGE GLUCOSE BLD GHB EST-MCNC: 120 MG/DL
EST. AVERAGE GLUCOSE BLD GHB EST-SCNC: 6.6 MMOL/L
GLUCOSE SERPL-MCNC: 96 MG/DL (ref 65–99)
HBA1C MFR BLD: 5.8 % OF TOTAL HGB
HDLC SERPL-MCNC: 44 MG/DL
LDLC SERPL CALC-MCNC: 121 MG/DL (CALC)
NONHDLC SERPL-MCNC: 156 MG/DL (CALC)
POTASSIUM SERPL-SCNC: 4.8 MMOL/L (ref 3.5–5.3)
PROT SERPL-MCNC: 6.9 G/DL (ref 6.1–8.1)
SODIUM SERPL-SCNC: 141 MMOL/L (ref 135–146)
TRIGL SERPL-MCNC: 229 MG/DL

## 2025-03-27 PROCEDURE — 92524 BEHAVRAL QUALIT ANALYS VOICE: CPT | Mod: GN | Performed by: SPEECH-LANGUAGE PATHOLOGIST

## 2025-03-27 PROCEDURE — 2700000081 HC SLP EMST 150 EXP MUSCLE STRGTH TRAINER: Performed by: SPEECH-LANGUAGE PATHOLOGIST

## 2025-03-27 ASSESSMENT — PAIN SCALES - GENERAL: PAINLEVEL_OUTOF10: 0 - NO PAIN

## 2025-03-27 ASSESSMENT — PAIN - FUNCTIONAL ASSESSMENT: PAIN_FUNCTIONAL_ASSESSMENT: 0-10

## 2025-03-27 NOTE — PROGRESS NOTES
Speech-Language Pathology    Voice Evaluation    Patient Name: Elysia Zuniga  MRN: [id number]  Today's Date: 3/27/2025     Time Calculation  Start Time: 1100  Stop Time: 1200  Time Calculation (min): 60 min      Current Problem:  Patient Active Problem List   Diagnosis    Lumbar spondylolysis    Mixed hyperlipidemia    Major depressive disorder, single episode, moderate (Multi)    Spinal stenosis of lumbar region without neurogenic claudication    Type 2 diabetes mellitus without complication, without long-term current use of insulin (Multi)    Well woman exam with routine gynecological exam    Obesity, Class III, BMI 40-49.9 (morbid obesity) (Multi)    Abnormal CXR    Right bundle branch block    Arthritis of hip    Spondylolysis, lumbar region    Chronic cough    Apnea    Lung nodule    Snoring    Chronic laryngitis    Gastroesophageal reflux disease with esophagitis    Allergic rhinitis    Obstructive sleep apnea syndrome    Symptomatic varicose veins, bilateral    Dysphonia    Vocal fold scar    Colon cancer screening       Voice Assessment:   Patient is a 63 yo female presenting with c/o hoarseness. She is known to me from previous EOC for hoarseness secondary to scarring of the right VF and secondary MTD. She completed therapy with positive response noted. She completed steroid injections which also helped to improve her voice x50% per patient report.    Patient continues to reports fluctuating vocal quality.     On recent stroboscopy, patient was noted to also have bilateral presbylarynges L>R and lowered respiratory drive and was referred for additional speech therapy services.     Discussed POC to include RMST, vocal strengthening and possible VF injection pending response to therapeutic intervention. Patient is in agreement and would like to begin therapy.     Initiated RMST with use of [id number]. The device was calibrated to patient's MEP identified as 75 cm H2O this date. Patient able to complete 5 sets  of 5 breaths through the device with accurate performance observed. Recommend they complete full set of RMST 5 days/week and to increase the resistance of their device by 1/4-1/2 turn each week. All questions answered.     Provided her with instruction on PhoRTE protocol steps 1-3. Patient able to demo clear, strong voicing during sustained /ah/. MPT 15 s this date.     Overall, patient would benefit from skilled ST in the area of voice in order to increase vocal wellness, healthy voicing to foster increased participation and comfort levels at home and in the community environment.     Skilled ST services are medically necessary and ordered by a physician in order to provide vocal hygiene program, decrease phono trauma and promote healthy voicing to encourage active, safe and sustainable vocal participation in the completion of patient's ADLs.      Voice Plan of Care:  Frequency: x1/eowk  Duration: x12 weeks  Number of Visits: 4-8  Recommendations for therapeutic interventions: Speech/Voice exercises, Vocal hygiene program, Oral resonance techniques, Habituation training, Vocal strengthening techniques  Prognosis: Good  Factors affecting prognosis: None  Discussed Plan of Care: Patient, Physician, Discussed risks/benefits with patient/caregiver, Patient/caregiver agreeable with Plan of Care      Subjective   Current Problem:     Patient is a 65 yo female presenting with c/o persistent hoarseness. She is referred to me by Dr. Bates.     Pmhx sig Acute lumbar radiculopathy (08/16/2023), Arthritis, De Quervain's tenosynovitis (08/16/2023), Depression, GERD (gastroesophageal reflux disease), Obesity, Class III, BMI 40-49.9 (morbid obesity) (Multi) (11/30/2023), Pain of left hip joint (09/11/2023), Pain of right lower extremity (06/05/2018), Pre-op evaluation (11/30/2023), Shortness of breath (01/22/2024), and Spinal stenosis of lumbar region without neurogenic claudication (08/16/2023).     She is known to me from  previous EOC targeting reduction of VF scar and vocal balancing for MTD. She did well in voice therapy with improvements made to vocal stamina, comfort and quality. She recently had a steroid injection to the right vf for scar reduction with additional improvements noted. Unfortunately, she continues to have a fluctuating vocal quality. She reports tightness in her throat when speaking accompanied by increased vocal effort. Her voice worsens with use. She was diligent with her exercise production while in therapy but reports a drop in adherence following her most recent steroid injection. Feels she did better when completing her exercises.     Of note, during her initial treatment course she had sig c/o cough - this has been resolved with patient reporting no aggressive coughing this date.     General Visit Information:  Patient Class: Outpatient  Living Environment: Home  Arrival: Independent  Ordering Physician: Dr. Bates  Reason for Referral: weak/strained voicing voicing    Objective     Pain Assessment:  Pain Assessment  Pain Assessment: 0-10  0-10 (Numeric) Pain Score: 0 - No pain      Voice Use Inventory:  Voice misuse/abuse: None  Exposure to Noise: No  Exposure to respiratory irritants: No  Consistent use of singing voice: No  Occupation relies on speaking voice: No    Patient Self Assessment:  Daily caffeine intake: Yes  Alcohol intake: Yes  Smoking history: No  Reflux history: Yes    Voice Objective:  Motor Speech Production: WFL  Pitch: Inadequate range  Voice Quality: Hoarse, Harsh, Strained-Strangled  Fluency: WFL  Prosody: WFL  Resonance: WFL  Loudness: Inadequate range      Voice Analysis:   Flexible Laryngoscopy w/ Videostroboscopy     VOICE AND SPEECH CHARACTERISTICS:  Normal spoken speech, (+) mild dysphonia, (+) mild roughness, no breathiness, (+) mild asthenia, (+) mild strain.     Intelligibility: normal.   Resonance: balanced.   Vocal Loudness: normal.   Breath Support: decreased.      PROCEDURE:    Indications: voice change  PROCEDURE NOTE: FLEXIBLE LARYNGOSCOPY WITH STROBOSCOPY  I recommended a flexible laryngoscopy with stroboscopy based on PE findings, and/or concern for mucosal wave details based upon history and/or for issues associated with hyperreflexic gag on mirror exam concerning for pathology. Risks, benefits, and alternatives were explained. The patient wishes to proceed and gives verbal consent.   Patient is seated in the exam chair. After adequate topical anesthesia, I advance the flexible endoscope. The examination included evaluation of the babin, vallecula, base of tongue, pyriforms, post-cricoid area, larynx and immediate subglottis.  subglottic edema:2 (present), ventricular obliteration: 2 (present), IA thickenin (mild), and TVC edema: 1 (mild)  Gross Arytenoid Movement: symmetric.  Arytenoid Height: normal.   Supraglottic Tension: lateral.  Symmetry: asymmetry.   Amplitude: reduced: right.  Phase Closure: in-phase.  Mucosal Wave Lateral Excursion/Secondary Wave: Right Vocal Cord: moderate restriction - Wave moved up to ¼ the width of the vocal fold.  Periodicity: aperiodic.  Closure: closed.  (Dr. Bates, 3/20/25)    Voice Treatment:  Individual(s) Educated: Patient  Verbal Education: Risks/benefits of therapy, Results of testing, Communication strategies  Response to Education: Verbalized understanding, Demonstrated understanding, Needs review  Patient/Caregiver Verbalized Understanding and Agreement: Yes        Active       Voice       SLP Goal 1 (Progressing)       Start:  24    Expected End:  25       Patient will increase vocal wellness and decrease phonotrauma in adherence with clinician prescribed vocal hygiene and wellness program per patient report.          SLP Goal 2 (Progressing)       Start:  24    Expected End:  25       Patient will increase ability to produce voice without tension within 5 minute conversational task x80% accuracy  as judged by clinician observation and/or patient report.          SLP Goal 3 (Progressing)       Start:  08/13/24    Expected End:  04/16/25       Patient will demonstrate independent use of voice/swallow/breathing techniques x80% accuracy.             Time In: 1100  Time Out: 1200       Patient will increase vocal wellness and decrease phonotrauma in adherence with clinician prescribed vocal hygiene and wellness program per patient report.   Patient will increase ability to produce voice without tension within 5 minute conversational task x80% accuracy as judged by clinician observation and/or patient report.   Patient will demonstrate independent use of voice/breathing techniques x80% accuracy.

## 2025-04-01 ENCOUNTER — APPOINTMENT (OUTPATIENT)
Dept: PRIMARY CARE | Facility: CLINIC | Age: 65
End: 2025-04-01
Payer: MEDICARE

## 2025-04-01 DIAGNOSIS — E78.2 MIXED HYPERLIPIDEMIA: ICD-10-CM

## 2025-04-01 DIAGNOSIS — E11.9 TYPE 2 DIABETES MELLITUS WITHOUT COMPLICATION, WITHOUT LONG-TERM CURRENT USE OF INSULIN: ICD-10-CM

## 2025-04-01 PROCEDURE — RXMED WILLOW AMBULATORY MEDICATION CHARGE

## 2025-04-01 RX ORDER — ATORVASTATIN CALCIUM 40 MG/1
40 TABLET, FILM COATED ORAL DAILY
Qty: 90 TABLET | Refills: 1 | Status: SHIPPED | OUTPATIENT
Start: 2025-04-01 | End: 2026-04-01

## 2025-04-01 NOTE — TELEPHONE ENCOUNTER
Patient called in today after missing her PCP visit today. She is rescheduled for 7/2025.     She is requesting refills for:  Rybelsus 14 mg  Atorvastatin 40 mg    Alexandria Linda, PharmD  Clinical Pharmacy Specialist

## 2025-04-03 ENCOUNTER — PHARMACY VISIT (OUTPATIENT)
Dept: PHARMACY | Facility: CLINIC | Age: 65
End: 2025-04-03
Payer: COMMERCIAL

## 2025-04-07 ENCOUNTER — TELEPHONE (OUTPATIENT)
Dept: PULMONOLOGY | Facility: HOSPITAL | Age: 65
End: 2025-04-07
Payer: MEDICARE

## 2025-04-07 NOTE — TELEPHONE ENCOUNTER
Patient acknowledged understanding. All questions answered at this time.     ----- Message from Aaron Gandhi sent at 4/7/2025  1:56 PM EDT -----  The lung nodule on CT chest in left apex is stable. No new concerning findings. Large hiatal hernia is stable. Follow antireflux measures. Follow up in March next year as scheduled.

## 2025-04-08 ENCOUNTER — APPOINTMENT (OUTPATIENT)
Dept: PHARMACY | Facility: HOSPITAL | Age: 65
End: 2025-04-08
Payer: MEDICARE

## 2025-04-08 DIAGNOSIS — E11.9 TYPE 2 DIABETES MELLITUS WITHOUT COMPLICATION, WITHOUT LONG-TERM CURRENT USE OF INSULIN: ICD-10-CM

## 2025-04-08 DIAGNOSIS — E78.2 MIXED HYPERLIPIDEMIA: ICD-10-CM

## 2025-04-08 NOTE — PROGRESS NOTES
Clinical Pharmacy Appointment    Patient ID: Elysia Zuniga is a 64 y.o. female who presents for Diabetes    Pt is here for Follow Up    Referring Provider: Bee Mera MD  PCP: Bee Mera MD  Last visit with PCP: 10/16/2024   Next visit with PCP: 7/21/2025      Subjective     Interval History  Continued Rybelsus 14 mg  A1c improved to 5.8%    HPI  Diabetes  Current  Pharmacotherapy:    Rybelsus 14 mg daily     SECONDARY PREVENTION  - Statin? Atorvastatin 20 mg  - ACE-I/ARB? No   - Aspirin? Yes    The 10-year ASCVD risk score (Andrew DIAZ, et al., 2019) is: 7.3%    Values used to calculate the score:      Age: 64 years      Sex: Female      Is Non- : No      Diabetic: Yes      Tobacco smoker: No      Systolic Blood Pressure: 105 mmHg      Is BP treated: No      HDL Cholesterol: 44 mg/dL      Total Cholesterol: 200 mg/dL       Current monitoring regimen:   Patient is using: finger sticks, intermittently      SMBG Fasting Readings:   AM  ~110-120 nighttime      VAF Application    Medication(s): Rybelsus    Application response: [Approved]    Approved until: 6/10/2025    Additional information:  - Patient will get this prescription mailed to them from  pharmacies to receive medication at no cost  - Patient will need to re-enroll in this program prior to expiration date to maintain coverage      Objective   Allergies   Allergen Reactions    Bee Venom Protein (Honey Bee) Other and Shortness of breath    Adhesive Tape-Silicones Hives    Mold Unknown    Penicillins Hives, Itching, Other and Swelling     Hives sob     Social History     Social History Narrative    Not on file      Medication Review  Current Outpatient Medications   Medication Instructions    ascorbic acid (VITAMIN C) 1,000 mg, Continuous PRN    aspirin 325 mg, Daily    atorvastatin (LIPITOR) 40 mg, oral, Daily    b complex 0.4 mg tablet 1 tablet, Daily    buPROPion XL (WELLBUTRIN XL) 150 mg, oral, Every  morning, Do not crush, chew, or split.    busPIRone (BUSPAR) 15 mg, oral, 3 times daily PRN    cetirizine (ZYRTEC) 10 mg    escitalopram (LEXAPRO) 10 mg, oral, Daily    famotidine (PEPCID) 40 mg, oral, Nightly    fluticasone (Flonase) 50 mcg/actuation nasal spray instill 2 sprays into each nostril once daily. shake gently. before first use, prime pump. after use, clean tip and replace cap.    FreeStyle lancets 28 gauge Use as instructed to test 3 times weekly    lysine 500 mg tablet 500 tablets, Once daily (morning) M-F (5 days a week)    melatonin 10 mg tablet,chewable Nightly    montelukast (SINGULAIR) 10 mg, oral, Nightly    multivitamin tablet 1 tablet, Daily    pantoprazole (PROTONIX) 40 mg, oral, Daily before breakfast, Do not crush, chew, or split.    Rybelsus 14 mg, oral, Daily      Vitals  BP Readings from Last 2 Encounters:   03/12/25 105/70   12/17/24 118/74     BMI Readings from Last 1 Encounters:   03/20/25 42.77 kg/m²      Labs  A1C  Lab Results   Component Value Date    HGBA1C 5.8 (H) 03/26/2025    HGBA1C 5.9 (H) 10/16/2024    HGBA1C 6.2 (H) 07/19/2024     BMP  Lab Results   Component Value Date    CALCIUM 9.2 03/26/2025     03/26/2025    K 4.8 03/26/2025    CO2 26 03/26/2025     03/26/2025    BUN 20 03/26/2025    CREATININE 0.68 03/26/2025    EGFR 97 03/26/2025     LFTs  Lab Results   Component Value Date    ALT 10 03/26/2025    AST 12 03/26/2025    ALKPHOS 85 03/26/2025    BILITOT 0.6 03/26/2025     FLP  Lab Results   Component Value Date    TRIG 229 (H) 03/26/2025    CHOL 200 (H) 03/26/2025    LDLF 152 (H) 04/27/2022    LDLCALC 121 (H) 03/26/2025    HDL 44 (L) 03/26/2025     Urine Microalbumin  Lab Results   Component Value Date    MICROALBCREA 7.2 07/19/2024     Weight Management  Wt Readings from Last 3 Encounters:   03/20/25 117 kg (257 lb)   03/12/25 112 kg (248 lb)   01/16/25 112 kg (248 lb)      There is no height or weight on file to calculate BMI.     Assessment/Plan   Problem  List Items Addressed This Visit       Mixed hyperlipidemia     Patient and I review updated lipid panel.     Saw an increase in LDL and TC. She believes that this is likely attributed to her not watching her diet. She would like to work on her diet before adding meds.     Will retest lipid panel in 3 months.     Plan:  CONTINUE atorvastatin 40 mg daily          Relevant Orders    Referral to Clinical Pharmacy    Lipid Panel    Type 2 diabetes mellitus without complication, without long-term current use of insulin     Patient's diabetes is currently controlled, with A1c of  5.8%    Patient continued to do well on Rybelsus. She admits that weight loss has plateaued. Her blood sugars have continued to be stable.     Plan:  CONTINUE  Rybelsus 14 mg daily  Rx to [facility] for PAP         Relevant Orders    Referral to Clinical Pharmacy       Clinical Pharmacist follow-up: 7/8 @1:20 PM, Telehealth visit    Continue all meds under the continuation of care with the referring provider and clinical pharmacy team.    Thank you,  Alexandria Linda, PharmD  Clinical Pharmacy Specialist     Verbal consent to manage patient's drug therapy was obtained from the patient. They were informed they may decline to participate or withdraw from participation in pharmacy services at any time.

## 2025-04-08 NOTE — ASSESSMENT & PLAN NOTE
Patient's diabetes is currently controlled, with A1c of  5.8%    Patient continued to do well on Rybelsus. She admits that weight loss has plateaued. Her blood sugars have continued to be stable.     Plan:  CONTINUE  Rybelsus 14 mg daily  Rx to [facility] for PAP

## 2025-04-08 NOTE — ASSESSMENT & PLAN NOTE
Patient and I review updated lipid panel.     Saw an increase in LDL and TC. She believes that this is likely attributed to her not watching her diet. She would like to work on her diet before adding meds.     Will retest lipid panel in 3 months.     Plan:  CONTINUE atorvastatin 40 mg daily

## 2025-04-10 ENCOUNTER — TREATMENT (OUTPATIENT)
Dept: SPEECH THERAPY | Facility: CLINIC | Age: 65
End: 2025-04-10
Payer: MEDICARE

## 2025-04-10 DIAGNOSIS — R49.0 DYSPHONIA: Primary | ICD-10-CM

## 2025-04-10 DIAGNOSIS — J38.3 VOCAL FOLD SCAR: ICD-10-CM

## 2025-04-10 PROCEDURE — 92507 TX SP LANG VOICE COMM INDIV: CPT | Mod: GN | Performed by: SPEECH-LANGUAGE PATHOLOGIST

## 2025-04-10 ASSESSMENT — PAIN - FUNCTIONAL ASSESSMENT: PAIN_FUNCTIONAL_ASSESSMENT: 0-10

## 2025-04-10 ASSESSMENT — PAIN SCALES - GENERAL: PAINLEVEL_OUTOF10: 0 - NO PAIN

## 2025-04-10 NOTE — PROGRESS NOTES
Speech-Language Pathology    SLP Adult Outpatient Speech-Language Pathology Treatment     Patient Name: Elysia Zuniga  MRN: [id number]  Today's Date: 4/10/2025     Time Calculation  Start Time: 1430  Stop Time: 1500  Time Calculation (min): 30 min      Current Problem:   1. Dysphonia        2. Vocal fold scar              SLP Assessment:  SLP TX Intervention Outcome: Making Progress Towards Goals  Prognosis: Excellent  Treatment Tolerance: Patient tolerated treatment well  Barriers: None  Education Provided: Yes    Patient presents for continued ST.    She endorses good adherence to all voicing exercises. Some increased PND 2/2 allergies.    Completed RMST with use of [id number]. She has increased the resistance from 75 cm H2O to 88 cm H2O. Patient able to complete 5 sets of 5 breaths through the device with accurate performance observed. Recommend they complete full set of RMST 5 days/week and to increase the resistance of their device by 1/4-1/2 turn each week. All questions answered.     Completed vocal strengthening with use of PhoRTE. MPT measured at 17 seconds this date, a 3 second increase from previous session. Able to reduce strain given cues for head sway. Some cough following voicing exercises - this improved with cues for reduced effort. She does best with glides w/UE movement.     Successful progression to phrase production with min cues.     Plan to see her back in x2-3 weeks for re-assessment and paragraph level voicing.     Overall, patient continues to benefit from skilled ST intervention in the area of voice for increased comfort and participation levels at home, work and in the community environment.     Skilled ST services are medically necessary and ordered by a physician in order to provide vocal hygiene program, decrease phono trauma and promote healthy voicing to encourage active, safe and sustainable vocal participation in the completion of patient's ADLs.    Plan:  Inpatient/Swing Bed or  Outpatient: Outpatient  Treatment/Interventions: Voice  SLP TX Plan: Continue Plan of Care  SLP Plan: Skilled SLP  SLP Frequency: Every other week  Duration: 12 weeks  SLP Discharge Recommendations: Home independent  Discussed POC: Patient  Discussed Risks/Benefits: Yes, Patient  Patient/Caregiver Agreeable: Yes      Subjective   Current Problem:  dysphonia    Most Recent Visit:  SLP Received On: 04/10/25    General Visit Information:   Reason for Referral: weak/strained voicing  Referred By: Dr. Bates  Patient Seen During This Visit: Yes  Arrival: Independent    Pain Assessment:  Pain Assessment  Pain Assessment: 0-10  0-10 (Numeric) Pain Score: 0 - No pain      Objective     Voice:  Voice Interventions: Vocal Hygiene Program, Vocal Strengthening Techniques, Respiratory Retraining  Voice Comments: EMST/PhoRTE all steps    Active       Voice       SLP Goal 1 (Progressing)       Start:  08/13/24    Expected End:  04/16/25       Patient will increase vocal wellness and decrease phonotrauma in adherence with clinician prescribed vocal hygiene and wellness program per patient report.          SLP Goal 2 (Progressing)       Start:  08/13/24    Expected End:  04/16/25       Patient will increase ability to produce voice without tension within 5 minute conversational task x80% accuracy as judged by clinician observation and/or patient report.          SLP Goal 3 (Progressing)       Start:  08/13/24    Expected End:  04/16/25       Patient will demonstrate independent use of voice/swallow/breathing techniques x80% accuracy.             Outpatient Education:  Adult Outpatient Education  Individual(s) Educated: Patient  Risk and Benefits Discussed with Patient/Caregiver/Other: yes  Patient/Caregiver Demonstrated Understanding: yes  Plan of Care Discussed and Agreed Upon: yes  Patient Response to Education: Patient/Caregiver Verbalized Understanding of Information, Patient/Caregiver Performed Return Demonstration of  Exercises/Activities, Patient/Caregiver Asked Appropriate Questions

## 2025-04-25 ENCOUNTER — APPOINTMENT (OUTPATIENT)
Dept: SPEECH THERAPY | Facility: CLINIC | Age: 65
End: 2025-04-25
Payer: MEDICARE

## 2025-04-25 DIAGNOSIS — J38.3 VOCAL FOLD SCAR: ICD-10-CM

## 2025-04-25 DIAGNOSIS — R49.0 DYSPHONIA: Primary | ICD-10-CM

## 2025-04-30 ENCOUNTER — TELEMEDICINE CLINICAL SUPPORT (OUTPATIENT)
Dept: SPEECH THERAPY | Facility: HOSPITAL | Age: 65
End: 2025-04-30
Payer: MEDICARE

## 2025-04-30 DIAGNOSIS — R49.0 DYSPHONIA: Primary | ICD-10-CM

## 2025-04-30 DIAGNOSIS — J38.3 VOCAL FOLD SCAR: ICD-10-CM

## 2025-04-30 PROCEDURE — 92507 TX SP LANG VOICE COMM INDIV: CPT | Mod: GN,95 | Performed by: SPEECH-LANGUAGE PATHOLOGIST

## 2025-04-30 ASSESSMENT — PAIN - FUNCTIONAL ASSESSMENT: PAIN_FUNCTIONAL_ASSESSMENT: 0-10

## 2025-04-30 ASSESSMENT — PAIN SCALES - GENERAL: PAINLEVEL_OUTOF10: 0 - NO PAIN

## 2025-04-30 NOTE — PROGRESS NOTES
Speech-Language Pathology    SLP Adult Outpatient Speech-Language Pathology Treatment     Patient Name: Elysia Zuniga  MRN: [id number]  Today's Date: 4/30/2025     Time Calculation  Start Time: 1345  Stop Time: 1415  Time Calculation (min): 30 min      Current Problem:   1. Dysphonia        2. Vocal fold scar            Virtual or Telephone Consent    An interactive audio and video telecommunication system which permits real time communications between the patient (at the originating site) and provider (at the distant site) was utilized to provide this telehealth service.   Verbal consent was requested and obtained from Elysia Zuniga on this date, 04/30/25 for a telehealth visit and the patient's location was confirmed at the time of the visit.   SLP Assessment:  SLP TX Intervention Outcome: Making Progress Towards Goals  Prognosis: Excellent  Treatment Tolerance: Patient tolerated treatment well  Barriers: None  Education Provided: Yes    Patient presents for continued ST intervention.    She reports recent regression in vocal quality d/t illness. States she lost her voice this weekend with increased cough and mucus production. Voice has been improving but has yet to fully recover.     Voice today has variable pitch and increased strain. Improved given cup bubbles, audible gargle. Discussed hold on PhoRTE exercises this date. She may return to her voice exercises given cessation of cough. Plan to access RVT instead for vocal recovery.     Continued RMST with use of [id number]. The device was calibrated to patient's MEP identified as 90 cm H2O this date. Patient able to complete 5 sets of 5 breaths through the device with accurate performance observed. Recommend they complete full set of RMST 5 days/week and to increase the resistance of their device by 1/4-1/2 turn each week. All questions answered. May benefit from nasal occlusion.    Plan to see her back in x1 month to assess progress.     Overall, patient  continues to benefit from skilled ST intervention in the area of voice for increased comfort and participation levels at home, work and in the community environment.     Skilled ST services are medically necessary and ordered by a physician in order to provide vocal hygiene program, decrease phono trauma and promote healthy voicing to encourage active, safe and sustainable vocal participation in the completion of patient's ADLs.    Plan:  Inpatient/Swing Bed or Outpatient: Outpatient  Treatment/Interventions: Voice  SLP TX Plan: Continue Plan of Care  SLP Plan: Skilled SLP  SLP Frequency: Every other week  Duration: 12 weeks  SLP Discharge Recommendations: Home independent  Discussed POC: Patient  Discussed Risks/Benefits: Yes, Patient  Patient/Caregiver Agreeable: Yes      Subjective   Current Problem:  Dysphonia     Most Recent Visit:  SLP Received On: 04/30/25    General Visit Information:   Reason for Referral: weak/strained voicing  Referred By: Dr. Bates  Patient Seen During This Visit: Yes  Arrival: Independent      Pain Assessment:  Pain Assessment  Pain Assessment: 0-10  0-10 (Numeric) Pain Score: 0 - No pain      Objective         Voice:  Voice Interventions: Vocal Hygiene Program, Oral Resonance Techniques  Voice Comments: RVT/EMST      Active       Voice       SLP Goal 1 (Progressing)       Start:  08/13/24    Expected End:  04/16/25       Patient will increase vocal wellness and decrease phonotrauma in adherence with clinician prescribed vocal hygiene and wellness program per patient report.          SLP Goal 2 (Progressing)       Start:  08/13/24    Expected End:  04/16/25       Patient will increase ability to produce voice without tension within 5 minute conversational task x80% accuracy as judged by clinician observation and/or patient report.          SLP Goal 3 (Progressing)       Start:  08/13/24    Expected End:  04/16/25       Patient will demonstrate independent use of voice/swallow/breathing  techniques x80% accuracy.             Outpatient Education:  Adult Outpatient Education  Individual(s) Educated: Patient  Risk and Benefits Discussed with Patient/Caregiver/Other: yes  Patient/Caregiver Demonstrated Understanding: yes  Plan of Care Discussed and Agreed Upon: yes  Patient Response to Education: Patient/Caregiver Verbalized Understanding of Information, Patient/Caregiver Performed Return Demonstration of Exercises/Activities, Patient/Caregiver Asked Appropriate Questions               Patient will increase vocal wellness and decrease phonotrauma in adherence with clinician prescribed vocal hygiene and wellness program per patient report.   Patient will increase ability to produce voice without tension within 5 minute conversational task x80% accuracy as judged by clinician observation and/or patient report.   Patient will demonstrate independent use of voice/breathing techniques x80% accuracy.

## 2025-05-09 DIAGNOSIS — F32.1 MAJOR DEPRESSIVE DISORDER, SINGLE EPISODE, MODERATE DEGREE (MULTI): ICD-10-CM

## 2025-05-09 RX ORDER — BUPROPION HYDROCHLORIDE 150 MG/1
150 TABLET ORAL EVERY MORNING
Qty: 90 TABLET | Refills: 1 | Status: SHIPPED | OUTPATIENT
Start: 2025-05-09 | End: 2026-05-09

## 2025-05-09 NOTE — TELEPHONE ENCOUNTER
Patient LVM requesting a refill of her bupropion XL to [facility].     Next visit with Dr. Mera 7/21/2025.     Alexandria Linda, PharmD  Clinical Pharmacy Specialist

## 2025-05-27 ENCOUNTER — TREATMENT (OUTPATIENT)
Dept: SPEECH THERAPY | Facility: CLINIC | Age: 65
End: 2025-05-27
Payer: MEDICARE

## 2025-05-27 DIAGNOSIS — R49.0 DYSPHONIA: Primary | ICD-10-CM

## 2025-05-27 DIAGNOSIS — J38.3 VOCAL FOLD SCAR: ICD-10-CM

## 2025-05-27 PROCEDURE — 92507 TX SP LANG VOICE COMM INDIV: CPT | Mod: GN | Performed by: SPEECH-LANGUAGE PATHOLOGIST

## 2025-05-27 ASSESSMENT — PAIN SCALES - GENERAL: PAINLEVEL_OUTOF10: 0 - NO PAIN

## 2025-05-27 ASSESSMENT — PAIN - FUNCTIONAL ASSESSMENT: PAIN_FUNCTIONAL_ASSESSMENT: 0-10

## 2025-05-27 NOTE — PROGRESS NOTES
Speech-Language Pathology    SLP Adult Outpatient Speech-Language Pathology Treatment     Patient Name: Elysia Zuniga  MRN: [id number]  Today's Date: 5/27/2025     Time Calculation  Start Time: 1430  Stop Time: 1500  Time Calculation (min): 30 min      Current Problem:   1. Dysphonia        2. Vocal fold scar              SLP Assessment:  SLP TX Intervention Outcome: Making Progress Towards Goals  Prognosis: Excellent  Treatment Tolerance: Patient tolerated treatment well  Barriers: None  Education Provided: Yes    Patient presents for continued ST intervention.     She reports ongoing recovery from recent URI. Voice has improved but is not 100%. Quality is less raspy and more stable than previous session.      Continued RMST with use of [id number]. The device was calibrated to patient's MEP identified as 95 cm H2O this date. Patient able to complete 5 sets of 5 breaths through the device with accurate performance observed. Recommend they complete full set of RMST 5 days/week and to increase the resistance of their device by 1/4-1/2 turn each week. All questions answered. Benefited from cues for forceful short bursts of air rather than longer productions.      Reviewed PhoRTE. Sig dysphonia on initial trial - voice improved given cues for elevated pitch production. MPT 15 s this date - a strong improvement. She does best with split glides. Able to progress to phrase and paragraph level targeting. Patient benefits from cues for reduced rate and pauses for breath.     Plan to complete CTT next session - may assess for discharge versus continued ST intervention.      Overall, patient continues to benefit from skilled ST intervention in the area of voice for increased comfort and participation levels at home, work and in the community environment.      Skilled ST services are medically necessary and ordered by a physician in order to provide vocal hygiene program, decrease phono trauma and promote healthy voicing to  encourage active, safe and sustainable vocal participation in the completion of patient's ADLs.    Plan:  Inpatient/Swing Bed or Outpatient: Outpatient  Treatment/Interventions: Voice  SLP TX Plan: Continue Plan of Care  SLP Plan: Skilled SLP  SLP Frequency: Every other week  Duration: 12 weeks  SLP Discharge Recommendations: Home independent  Discussed POC: Patient  Discussed Risks/Benefits: Yes, Patient  Patient/Caregiver Agreeable: Yes      Subjective   Current Problem:  Dysphonia     SLP Visit Info:  SLP Received On: 05/27/25    General Visit Information:  Reason for Referral: weak/strained voicing  Referred By: Dr. Bates  Arrival: Independent    Pain Assessment:  Pain Assessment  Pain Assessment: 0-10  0-10 (Numeric) Pain Score: 0 - No pain      Objective       Voice:  Voice Interventions: Vocal Hygiene Program, Oral Resonance Techniques, Vocal Strengthening Techniques, Respiratory Retraining  Voice Comments: PhoRTE - paragraph level EMST         Active       Voice       SLP Goal 1 (Progressing)       Start:  08/13/24    Expected End:  04/16/25       Patient will increase vocal wellness and decrease phonotrauma in adherence with clinician prescribed vocal hygiene and wellness program per patient report.          SLP Goal 2 (Progressing)       Start:  08/13/24    Expected End:  04/16/25       Patient will increase ability to produce voice without tension within 5 minute conversational task x80% accuracy as judged by clinician observation and/or patient report.          SLP Goal 3 (Progressing)       Start:  08/13/24    Expected End:  04/16/25       Patient will demonstrate independent use of voice/swallow/breathing techniques x80% accuracy.             Outpatient Education:  Adult Outpatient Education  Individual(s) Educated: Patient  Risk and Benefits Discussed with Patient/Caregiver/Other: yes  Patient/Caregiver Demonstrated Understanding: yes  Plan of Care Discussed and Agreed Upon: yes  Patient Response to  Education: Patient/Caregiver Verbalized Understanding of Information, Patient/Caregiver Performed Return Demonstration of Exercises/Activities, Patient/Caregiver Asked Appropriate Questions           Patient will increase vocal wellness and decrease phonotrauma in adherence with clinician prescribed vocal hygiene and wellness program per patient report.   Patient will increase ability to produce voice without tension within 5 minute conversational task x80% accuracy as judged by clinician observation and/or patient report.   Patient will demonstrate independent use of voice/breathing techniques x80% accuracy.  Patient will increase the strength of laryngeal/respiratory musculature.

## 2025-06-09 NOTE — ASSESSMENT & PLAN NOTE
>>ASSESSMENT AND PLAN FOR OBESITY DUE TO EXCESS CALORIES WITHOUT SERIOUS COMORBIDITY WRITTEN ON 9/18/2023 12:58 AM BY CRISTIAN SAGASTUME MD    Discussed healthy lifestyle, diet, exercise and weight loss. Discussed role in glucose management.   
>>ASSESSMENT AND PLAN FOR PAIN, JOINT, HIP, LEFT WRITTEN ON 9/11/2023 12:21 PM BY CRISTIAN SAGASTUME MD    Reviewed prior xray. She had advanced arthritis left hip. NSAIDs cause leg swelling. Will add diclofenac gel Refer to ortho  
Discussed healthy lifestyle, diet, exercise and weight loss. Discussed role in glucose management.   
Doing great on medicatio. Refilled meds for one yr.   
Reviewed prior xray. She had advanced arthritis left hip. NSAIDs cause leg swelling. Will add diclofenac gel Refer to ortho  
Significance unclear. Check A1C.   
[TextEntry] : The child lives at home with parents. and twin brother

## 2025-07-02 NOTE — PROGRESS NOTES
Clinical Pharmacy Appointment    Patient ID: Elysia Zuniga is a 64 y.o. female who presents for No chief complaint on file.    Pt is here for Follow Up    Referring Provider: Bee Mera MD  PCP: Bee Mera MD  Last visit with PCP: 10/16/2024   Next visit with PCP: 7/21/2025      Subjective     Interval History  Continued Rybelsus 14 mg  A1c improved to 5.8%    HPI  Diabetes  Current  Pharmacotherapy:    Rybelsus 14 mg daily     SECONDARY PREVENTION  - Statin? Atorvastatin 20 mg  - ACE-I/ARB? No   - Aspirin? Yes    The 10-year ASCVD risk score (Andrew DIAZ, et al., 2019) is: 7.3%    Values used to calculate the score:      Age: 64 years      Sex: Female      Is Non- : No      Diabetic: Yes      Tobacco smoker: No      Systolic Blood Pressure: 105 mmHg      Is BP treated: No      HDL Cholesterol: 44 mg/dL      Total Cholesterol: 200 mg/dL       Current monitoring regimen:   Patient is using: finger sticks, intermittently      SMBG Fasting Readings:   AM  ~110-120 nighttime      [place] Application    Medication(s): Rybelsus    Application response: [Approved]    Approved until: 6/10/2025    Additional information:  - Patient will get this prescription mailed to them from  pharmacies to receive medication at no cost  - Patient will need to re-enroll in this program prior to expiration date to maintain coverage      Objective   Allergies   Allergen Reactions    Bee Venom Protein (Honey Bee) Other and Shortness of breath    Adhesive Tape-Silicones Hives    Mold Unknown    Penicillins Hives, Itching, Other and Swelling     Hives sob     Social History     Social History Narrative    Not on file      Medication Review  Current Outpatient Medications   Medication Instructions    ascorbic acid (VITAMIN C) 1,000 mg, Continuous PRN    aspirin 325 mg, Daily    atorvastatin (LIPITOR) 40 mg, oral, Daily    b complex 0.4 mg tablet 1 tablet, Daily    buPROPion XL (WELLBUTRIN XL) 150  mg, oral, Every morning, Do not crush, chew, or split.    busPIRone (BUSPAR) 15 mg, oral, 3 times daily PRN    cetirizine (ZYRTEC) 10 mg    escitalopram (LEXAPRO) 10 mg, oral, Daily    famotidine (PEPCID) 40 mg, oral, Nightly    fluticasone (Flonase) 50 mcg/actuation nasal spray instill 2 sprays into each nostril once daily. shake gently. before first use, prime pump. after use, clean tip and replace cap.    FreeStyle lancets 28 gauge Use as instructed to test 3 times weekly    lysine 500 mg tablet 500 tablets, Once daily (morning) M-F (5 days a week)    melatonin 10 mg tablet,chewable Nightly    montelukast (SINGULAIR) 10 mg, oral, Nightly    multivitamin tablet 1 tablet, Daily    pantoprazole (PROTONIX) 40 mg, oral, Daily before breakfast, Do not crush, chew, or split.    Rybelsus 14 mg, oral, Daily      Vitals  BP Readings from Last 2 Encounters:   03/12/25 105/70   12/17/24 118/74     BMI Readings from Last 1 Encounters:   03/20/25 42.77 kg/m²      Labs  A1C  Lab Results   Component Value Date    HGBA1C 5.8 (H) 03/26/2025    HGBA1C 5.9 (H) 10/16/2024    HGBA1C 6.2 (H) 07/19/2024     BMP  Lab Results   Component Value Date    CALCIUM 9.2 03/26/2025     03/26/2025    K 4.8 03/26/2025    CO2 26 03/26/2025     03/26/2025    BUN 20 03/26/2025    CREATININE 0.68 03/26/2025    EGFR 97 03/26/2025     LFTs  Lab Results   Component Value Date    ALT 10 03/26/2025    AST 12 03/26/2025    ALKPHOS 85 03/26/2025    BILITOT 0.6 03/26/2025     FLP  Lab Results   Component Value Date    TRIG 229 (H) 03/26/2025    CHOL 200 (H) 03/26/2025    LDLF 152 (H) 04/27/2022    LDLCALC 121 (H) 03/26/2025    HDL 44 (L) 03/26/2025     Urine Microalbumin  Lab Results   Component Value Date    MICROALBCREA 7.2 07/19/2024     Weight Management  Wt Readings from Last 3 Encounters:   03/20/25 117 kg (257 lb)   03/12/25 112 kg (248 lb)   01/16/25 112 kg (248 lb)      There is no height or weight on file to calculate BMI.      Assessment/Plan   Problem List Items Addressed This Visit    None        Clinical Pharmacist follow-up: 7/8 @1:20 PM, Telehealth visit    Continue all meds under the continuation of care with the referring provider and clinical pharmacy team.    Thank you,  Alexandria Linda, PharmD  Clinical Pharmacy Specialist     Verbal consent to manage patient's drug therapy was obtained from the patient. They were informed they may decline to participate or withdraw from participation in pharmacy services at any time.

## 2025-07-08 ENCOUNTER — APPOINTMENT (OUTPATIENT)
Dept: PHARMACY | Facility: HOSPITAL | Age: 65
End: 2025-07-08
Payer: MEDICARE

## 2025-07-08 ENCOUNTER — APPOINTMENT (OUTPATIENT)
Dept: OPHTHALMOLOGY | Facility: CLINIC | Age: 65
End: 2025-07-08
Payer: MEDICARE

## 2025-07-08 DIAGNOSIS — H18.523 ANTERIOR BASEMENT MEMBRANE DYSTROPHY OF BOTH EYES: ICD-10-CM

## 2025-07-08 DIAGNOSIS — E11.9 TYPE 2 DIABETES MELLITUS WITHOUT COMPLICATION, WITHOUT LONG-TERM CURRENT USE OF INSULIN: ICD-10-CM

## 2025-07-08 DIAGNOSIS — H52.13 MYOPIA, BILATERAL: ICD-10-CM

## 2025-07-08 DIAGNOSIS — H35.371 EPIRETINAL MEMBRANE (ERM) OF RIGHT EYE: Primary | ICD-10-CM

## 2025-07-08 DIAGNOSIS — H25.813 COMBINED FORMS OF AGE-RELATED CATARACT OF BOTH EYES: ICD-10-CM

## 2025-07-08 PROCEDURE — 92134 CPTRZ OPH DX IMG PST SGM RTA: CPT | Performed by: OPHTHALMOLOGY

## 2025-07-08 PROCEDURE — 99213 OFFICE O/P EST LOW 20 MIN: CPT | Performed by: OPHTHALMOLOGY

## 2025-07-08 PROCEDURE — 2023F DILAT RTA XM W/O RTNOPTHY: CPT | Performed by: OPHTHALMOLOGY

## 2025-07-08 ASSESSMENT — VISUAL ACUITY
METHOD: SNELLEN - LINEAR
OD_CC: 20/30
CORRECTION_TYPE: GLASSES
OS_CC: 20/25

## 2025-07-08 ASSESSMENT — ENCOUNTER SYMPTOMS
HEMATOLOGIC/LYMPHATIC NEGATIVE: 0
RESPIRATORY NEGATIVE: 0
EYES NEGATIVE: 1
PSYCHIATRIC NEGATIVE: 0
ENDOCRINE NEGATIVE: 0
NEUROLOGICAL NEGATIVE: 0
MUSCULOSKELETAL NEGATIVE: 0
GASTROINTESTINAL NEGATIVE: 0
CARDIOVASCULAR NEGATIVE: 0
ALLERGIC/IMMUNOLOGIC NEGATIVE: 0
CONSTITUTIONAL NEGATIVE: 0

## 2025-07-08 ASSESSMENT — CUP TO DISC RATIO
OD_RATIO: 0.0
OS_RATIO: 0.0

## 2025-07-08 ASSESSMENT — CONF VISUAL FIELD
OS_INFERIOR_TEMPORAL_RESTRICTION: 0
OD_NORMAL: 1
OS_NORMAL: 1
OS_INFERIOR_NASAL_RESTRICTION: 0
OD_INFERIOR_TEMPORAL_RESTRICTION: 0
OD_SUPERIOR_NASAL_RESTRICTION: 0
OD_INFERIOR_NASAL_RESTRICTION: 0
OS_SUPERIOR_TEMPORAL_RESTRICTION: 0
OS_SUPERIOR_NASAL_RESTRICTION: 0
OD_SUPERIOR_TEMPORAL_RESTRICTION: 0

## 2025-07-08 ASSESSMENT — REFRACTION_WEARINGRX
OS_CYLINDER: -0.50
OS_AXIS: 162
OD_AXIS: 003
SPECS_TYPE: SVL
OD_CYLINDER: -0.25
OS_SPHERE: -8.25
OD_SPHERE: -8.50

## 2025-07-08 ASSESSMENT — REFRACTION_MANIFEST
OS_AXIS: 160
OD_SPHERE: -7.75
OD_CYLINDER: -0.50
OS_SPHERE: -8.00
OD_ADD: +2.50
OS_ADD: +2.50
OS_CYLINDER: -0.50
OD_AXIS: 050

## 2025-07-08 ASSESSMENT — EXTERNAL EXAM - LEFT EYE: OS_EXAM: NORMAL

## 2025-07-08 ASSESSMENT — SLIT LAMP EXAM - LIDS
COMMENTS: NORMAL
COMMENTS: NORMAL

## 2025-07-08 ASSESSMENT — TONOMETRY
OS_IOP_MMHG: 13
OD_IOP_MMHG: 13
IOP_METHOD: GOLDMANN APPLANATION

## 2025-07-08 ASSESSMENT — EXTERNAL EXAM - RIGHT EYE: OD_EXAM: NORMAL

## 2025-07-08 NOTE — PROGRESS NOTES
Annual visit    Diabetes mellitus (DM) without diabetic retinopathy (DR)  Diabetes mellitus (DM) for 1 year  Last A1c was 5.8   on metformin and semaglutide  has HLD is not under control, BP is ok  On exam - no retinopathy  Monitor with year DFE< educated patient on good BG, BP and lipid control.      2. 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 EBMD OU  Mild, not vis sign, monitor    4. Epiretinal membrane (ERM) OU: OD worse  Patient asymptomatic  OCT: epiretinal membrane (ERM) OU with normal foveal contour  Monitor with yearly OCT      5. Myopia OU  Has not worn contacts for 10 years due to allergies  Cannot wear bifocal either, slips down glasses to see up close  Mrx has changed - new given today  Takes off glasses to read

## 2025-07-16 LAB
CHOLEST SERPL-MCNC: 169 MG/DL
CHOLEST/HDLC SERPL: 3.5 (CALC)
HDLC SERPL-MCNC: 48 MG/DL
LDLC SERPL CALC-MCNC: 91 MG/DL (CALC)
NONHDLC SERPL-MCNC: 121 MG/DL (CALC)
TRIGL SERPL-MCNC: 210 MG/DL

## 2025-07-21 ENCOUNTER — APPOINTMENT (OUTPATIENT)
Dept: PRIMARY CARE | Facility: CLINIC | Age: 65
End: 2025-07-21
Payer: MEDICARE

## 2025-07-21 VITALS
HEART RATE: 81 BPM | OXYGEN SATURATION: 97 % | TEMPERATURE: 97.8 F | WEIGHT: 256 LBS | BODY MASS INDEX: 42.6 KG/M2 | DIASTOLIC BLOOD PRESSURE: 70 MMHG | SYSTOLIC BLOOD PRESSURE: 106 MMHG

## 2025-07-21 DIAGNOSIS — I10 HTN, GOAL BELOW 130/80: ICD-10-CM

## 2025-07-21 DIAGNOSIS — F33.9 MAJOR DEPRESSION, RECURRENT, CHRONIC: ICD-10-CM

## 2025-07-21 DIAGNOSIS — E11.9 TYPE 2 DIABETES MELLITUS WITHOUT COMPLICATION, WITHOUT LONG-TERM CURRENT USE OF INSULIN: ICD-10-CM

## 2025-07-21 DIAGNOSIS — E78.2 MIXED HYPERLIPIDEMIA: ICD-10-CM

## 2025-07-21 DIAGNOSIS — M54.32 SCIATICA, LEFT SIDE: ICD-10-CM

## 2025-07-21 DIAGNOSIS — G47.33 OBSTRUCTIVE SLEEP APNEA SYNDROME: ICD-10-CM

## 2025-07-21 DIAGNOSIS — M43.06 SPONDYLOLYSIS, LUMBAR REGION: ICD-10-CM

## 2025-07-21 DIAGNOSIS — Z12.31 SCREENING MAMMOGRAM, ENCOUNTER FOR: ICD-10-CM

## 2025-07-21 PROCEDURE — 3074F SYST BP LT 130 MM HG: CPT | Performed by: FAMILY MEDICINE

## 2025-07-21 PROCEDURE — 99214 OFFICE O/P EST MOD 30 MIN: CPT | Performed by: FAMILY MEDICINE

## 2025-07-21 PROCEDURE — 3078F DIAST BP <80 MM HG: CPT | Performed by: FAMILY MEDICINE

## 2025-07-21 RX ORDER — PREDNISONE 20 MG/1
20 TABLET ORAL DAILY
Qty: 5 TABLET | Refills: 0 | Status: SHIPPED | OUTPATIENT
Start: 2025-07-21 | End: 2025-07-26

## 2025-07-21 ASSESSMENT — ENCOUNTER SYMPTOMS
SHORTNESS OF BREATH: 0
SLEEP DISTURBANCE: 0
CHILLS: 0
ACTIVITY CHANGE: 0
FEVER: 0
ARTHRALGIAS: 1
ABDOMINAL PAIN: 0
BACK PAIN: 1
NECK PAIN: 1
APPETITE CHANGE: 0
MYALGIAS: 1
LIGHT-HEADEDNESS: 0
DIZZINESS: 0

## 2025-07-21 NOTE — ASSESSMENT & PLAN NOTE
Due to severity of mri fr 2018 with moderate stenosis, worsening back pain, will refer to specialist. Surgery was recommended for pt in the past but she had refused and depending on severity, might be more open to it this time   Orders:    XR lumbar spine 2-3 views; Future    Referral to Orthopedics and Sports Medicine; Future

## 2025-07-21 NOTE — PROGRESS NOTES
Subjective   Patient ID: Elysia Zuniga is a 64 y.o. female who presents for New Patient Visit, Establish Care, and Back Pain (Discuss lower back pain radiating into buttocks and down L leg x4 days. NKI ).    HPI   Type 2 diabetes  On Rybelsus to 14 mg; lost 20 lbs when initially put on it   She feels like this is not helping her lose weight and wants to trial being on ozempic  She is having worsening low back pain and cannot exercise due to sciatica  Her back pain improved after she lost weight but now feels like weight is going back up and so is the LBP     Hyperlipidemia   Atorvastatin to 40 mg daily. Compliant with it and no side effects fr meds    WESLEY   Compliant with cpap and benefiting fr it     Depression   On wellbutrin 150 XL and lexapro 10 mg     Anxiety   Compliant with buspar 15 tid prn     GERD   On famotidine 40 mg and pantoprazole 40 mg     Seasonal allergies   On montelukast 10 mg     Patient never smoked     Chronic low back pain   Worsening   Now walking with walker and didn't require assistance in the past  Mri lumbar 2018   IMPRESSION:  1. Limited, motion degraded examination.  2. Right paracentral/right lateral superior disc extrusion at L2-3  with superior migration of herniated disc material. The possibility  of a superimposed free disc fragment is not excluded. There is likely  at least moderate central canal stenosis. There is effacement of the  right lateral recess and moderate right-sided neural foraminal  stenosis due to an intraforaminal component of the disc herniation.  3. Abnormal signal along the endplates and within the disc space at  L2/3 is nonspecific but thought to most likely be degenerative in  etiology. There are corresponding sclerotic changes on CT with vacuum  disc phenomenon. Discitis/osteomyelitis is thought to be less likely.  4. Multilevel degenerative changes of the lumbar spine.  5. Cholelithiasis.    No recent xrays of back recently   Starts in lower back and tra  "vels to left buttock area  No numnmess no tingling no weakness in LE  Taking tylenol and aspirin and helping some    Need R nee replacement that pt is \"putting off\"     No visits with results within 30 Day(s) from this visit.   Latest known visit with results is:   Telemedicine on 04/08/2025   Component Date Value Ref Range Status    CHOLESTEROL, TOTAL 07/15/2025 169  <200 mg/dL Final    HDL CHOLESTEROL 07/15/2025 48 (L)  > OR = 50 mg/dL Final    TRIGLYCERIDES 07/15/2025 210 (H)  <150 mg/dL Final    Comment:    If a non-fasting specimen was collected, consider  repeat triglyceride testing on a fasting specimen  if clinically indicated.   Rhonda et al. J. of Clin. Lipidol. 2015;9:129-169.         LDL-CHOLESTEROL 07/15/2025 91  mg/dL (calc) Final    Comment: Reference range: <100     Desirable range <100 mg/dL for primary prevention;    <70 mg/dL for patients with CHD or diabetic patients   with > or = 2 CHD risk factors.     LDL-C is now calculated using the Jono-Hernandez   calculation, which is a validated novel method providing   better accuracy than the Friedewald equation in the   estimation of LDL-C.   Jono SS et al. FELICITA. 2013;310(19): 7638-7710   (http://education.Shopsense.DataLocker/faq/WAH036)      CHOL/HDLC RATIO 07/15/2025 3.5  <5.0 (calc) Final    NON HDL CHOLESTEROL 07/15/2025 121  <130 mg/dL (calc) Final    Comment: For patients with diabetes plus 1 major ASCVD risk   factor, treating to a non-HDL-C goal of <100 mg/dL   (LDL-C of <70 mg/dL) is considered a therapeutic   option.       Objective   /70   Pulse 81   Temp 36.6 °C (97.8 °F)   Wt 116 kg (256 lb)   SpO2 97%   BMI 42.60 kg/m²   Review of Systems   Constitutional:  Negative for activity change, appetite change, chills and fever.   Eyes:  Negative for visual disturbance.   Respiratory:  Negative for shortness of breath.    Cardiovascular:  Negative for chest pain.   Gastrointestinal:  Negative for abdominal pain. "   Musculoskeletal:  Positive for arthralgias, back pain, gait problem, myalgias and neck pain.   Skin:  Negative for rash.   Neurological:  Negative for dizziness and light-headedness.   Psychiatric/Behavioral:  Negative for sleep disturbance.       Physical Exam  Constitutional:       Appearance: Normal appearance.      Comments: Uses walker       Eyes:      Pupils: Pupils are equal, round, and reactive to light.       Cardiovascular:      Rate and Rhythm: Normal rate and regular rhythm.   Pulmonary:      Effort: Pulmonary effort is normal.      Breath sounds: Normal breath sounds.   Abdominal:      General: Abdomen is flat. Bowel sounds are normal.      Palpations: Abdomen is soft.     Musculoskeletal:         General: Normal range of motion.     Skin:     General: Skin is warm.     Neurological:      General: No focal deficit present.      Mental Status: She is alert.     Psychiatric:         Mood and Affect: Mood normal.         Assessment/Plan   Assessment & Plan  Spondylolysis, lumbar region  Due to severity of mri fr 2018 with moderate stenosis, worsening back pain, will refer to specialist. Surgery was recommended for pt in the past but she had refused and depending on severity, might be more open to it this time   Orders:    XR lumbar spine 2-3 views; Future    Referral to Orthopedics and Sports Medicine; Future    Screening mammogram, encounter for    Orders:    BI mammo bilateral screening tomosynthesis; Future    Type 2 diabetes mellitus without complication, without long-term current use of insulin  Advised of GLP-1 adverse effects  - acute kidney injury requiring dialysis  - gallbladder disease  - immediate hypersensitivity reactions, including anaphylaxis and angioedema  -  Pancreatitis  - Nausea, constipation   - contraindication in personal or family history of MEN syndrome or MTC  Stop rybelsus, start ozempic  Pt will call if there are any issues   Orders:    Hemoglobin A1C; Future     Albumin-Creatinine Ratio, Urine Random; Future    CBC and Auto Differential; Future    semaglutide 0.25 mg or 0.5 mg (2 mg/3 mL) pen injector; Inject 0.25 mg under the skin 1 (one) time per week.    Obstructive sleep apnea syndrome         Major depression, recurrent, chronic  Stable with meds        HTN, goal below 130/80    Orders:    Comprehensive Metabolic Panel; Future    CBC and Auto Differential; Future    Mixed hyperlipidemia    Orders:    Lipid Panel; Future    CBC and Auto Differential; Future    Sciatica, left side    Orders:    Referral to Physical Therapy; Future    Disability Placard    predniSONE (Deltasone) 20 mg tablet; Take 1 tablet (20 mg) by mouth once daily for 5 days.  6 wks dm2 started pt on ozempic

## 2025-07-21 NOTE — PROGRESS NOTES
Clinical Pharmacy Appointment    Patient ID: Elysia Zuniga is a 64 y.o. female who presents for Diabetes    Pt is here for Follow Up    Referring Provider: Bee Mera MD  PCP: Bee Mera MD  Last visit with PCP: 7/21/2025  Next visit with PCP: 9/4/2025      Subjective     Interval History  PCP transitioned to Ozempic to help with weight loss.     HPI  Diabetes  Current  Pharmacotherapy:    Ozempic 0.25 mg weekly     SECONDARY PREVENTION  - Statin? Atorvastatin 20 mg  - ACE-I/ARB? No   - Aspirin? Yes    The 10-year ASCVD risk score (Andrew DIAZ, et al., 2019) is: 6.4%    Values used to calculate the score:      Age: 64 years      Clincally relevant sex: Female      Is Non- : No      Diabetic: Yes      Tobacco smoker: No      Systolic Blood Pressure: 106 mmHg      Is BP treated: No      HDL Cholesterol: 48 mg/dL      Total Cholesterol: 169 mg/dL       Current monitoring regimen:   Patient is using: finger sticks, intermittently      SMBG Fasting Readings:   AM  ~110-120 nighttime      VAF Application    Medication(s): Rybelsus    Application response: [Approved]    Approved until: 6/10/2025    Additional information:  - Patient will get this prescription mailed to them from  pharmacies to receive medication at no cost  - Patient will need to re-enroll in this program prior to expiration date to maintain coverage      Objective   Allergies   Allergen Reactions    Bee Venom Protein (Honey Bee) Other and Shortness of breath    Adhesive Tape-Silicones Hives    Mold Unknown    Penicillins Hives, Itching, Swelling and Other     Hives sob     Social History     Social History Narrative    Not on file      Medication Review  Current Outpatient Medications   Medication Instructions    ascorbic acid (VITAMIN C) 1,000 mg, Daily    aspirin 325 mg, As needed    atorvastatin (LIPITOR) 40 mg, oral, Daily    b complex 0.4 mg tablet 1 tablet, Daily    buPROPion XL (WELLBUTRIN XL) 150  mg, oral, Every morning, Do not crush, chew, or split.    busPIRone (BUSPAR) 15 mg, oral, 3 times daily PRN    cetirizine (ZYRTEC) 10 mg, Daily    escitalopram (LEXAPRO) 10 mg, oral, Daily    famotidine (PEPCID) 40 mg, oral, Nightly    fluticasone (Flonase) 50 mcg/actuation nasal spray instill 2 sprays into each nostril once daily. shake gently. before first use, prime pump. after use, clean tip and replace cap.    FreeStyle lancets 28 gauge Use as instructed to test 3 times weekly    lysine 500 mg tablet 500 tablets, Once daily (morning) M-F (5 days a week)    melatonin 10 mg tablet,chewable Nightly    montelukast (SINGULAIR) 10 mg, oral, Nightly    multivitamin tablet 1 tablet, Daily    pantoprazole (PROTONIX) 40 mg, oral, Daily before breakfast, Do not crush, chew, or split.    predniSONE (DELTASONE) 20 mg, oral, Daily    semaglutide 0.5 mg, subcutaneous, Weekly      Vitals  BP Readings from Last 2 Encounters:   07/21/25 106/70   03/12/25 105/70     BMI Readings from Last 1 Encounters:   07/21/25 42.60 kg/m²      Labs  A1C  Lab Results   Component Value Date    HGBA1C 5.8 (H) 03/26/2025    HGBA1C 5.9 (H) 10/16/2024    HGBA1C 6.2 (H) 07/19/2024     BMP  Lab Results   Component Value Date    CALCIUM 9.2 03/26/2025     03/26/2025    K 4.8 03/26/2025    CO2 26 03/26/2025     03/26/2025    BUN 20 03/26/2025    CREATININE 0.68 03/26/2025    EGFR 97 03/26/2025     LFTs  Lab Results   Component Value Date    ALT 10 03/26/2025    AST 12 03/26/2025    ALKPHOS 85 03/26/2025    BILITOT 0.6 03/26/2025     FLP  Lab Results   Component Value Date    TRIG 210 (H) 07/15/2025    CHOL 169 07/15/2025    LDLF 152 (H) 04/27/2022    LDLCALC 91 07/15/2025    HDL 48 (L) 07/15/2025     Urine Microalbumin  Lab Results   Component Value Date    MICROALBCREA 7.2 07/19/2024     Weight Management  Wt Readings from Last 3 Encounters:   07/21/25 116 kg (256 lb)   03/20/25 117 kg (257 lb)   03/12/25 112 kg (248 lb)      There is no  height or weight on file to calculate BMI.     Assessment/Plan   Problem List Items Addressed This Visit       Type 2 diabetes mellitus without complication, without long-term current use of insulin    Patient's diabetes is currently controlled, with A1c of  5.8%    Patient continued to do well on Rybelsus. Sugars are stable but she has stopped losing weight. PCP switched to Ozempic. Patient picked up first fill but is concerned over the cost. Discuss re-enrolling her in  PAP.     Because she was already established on semglutide, patient and I discuss starting on 0.5 mg dose of Ozempic and she is agreeable.     Plan:  START Ozempic 0.5 mg weekly  Will submit to [facility] when PAP documents are available.          Relevant Medications    semaglutide 0.25 mg or 0.5 mg (2 mg/3 mL) pen injector    Other Relevant Orders    Referral to Clinical Pharmacy     Clinical Pharmacist follow-up: 8/19 @1:40 PM, Telehealth visit    Continue all meds under the continuation of care with the referring provider and clinical pharmacy team.    Thank you,  Alexandria Linda, PharmD  Clinical Pharmacy Specialist     Verbal consent to manage patient's drug therapy was obtained from the patient. They were informed they may decline to participate or withdraw from participation in pharmacy services at any time.

## 2025-07-21 NOTE — ASSESSMENT & PLAN NOTE
Advised of GLP-1 adverse effects  - acute kidney injury requiring dialysis  - gallbladder disease  - immediate hypersensitivity reactions, including anaphylaxis and angioedema  -  Pancreatitis  - Nausea, constipation   - contraindication in personal or family history of MEN syndrome or MTC  Stop rybelsus, start ozempic  Pt will call if there are any issues   Orders:    Hemoglobin A1C; Future    Albumin-Creatinine Ratio, Urine Random; Future    CBC and Auto Differential; Future    semaglutide 0.25 mg or 0.5 mg (2 mg/3 mL) pen injector; Inject 0.25 mg under the skin 1 (one) time per week.

## 2025-07-22 ENCOUNTER — APPOINTMENT (OUTPATIENT)
Dept: PHARMACY | Facility: HOSPITAL | Age: 65
End: 2025-07-22
Payer: MEDICARE

## 2025-07-22 ENCOUNTER — HOSPITAL ENCOUNTER (OUTPATIENT)
Dept: RADIOLOGY | Facility: CLINIC | Age: 65
Discharge: HOME | End: 2025-07-22
Payer: MEDICARE

## 2025-07-22 DIAGNOSIS — M43.06 SPONDYLOLYSIS, LUMBAR REGION: ICD-10-CM

## 2025-07-22 DIAGNOSIS — E11.9 TYPE 2 DIABETES MELLITUS WITHOUT COMPLICATION, WITHOUT LONG-TERM CURRENT USE OF INSULIN: ICD-10-CM

## 2025-07-22 PROCEDURE — 72100 X-RAY EXAM L-S SPINE 2/3 VWS: CPT

## 2025-07-22 PROCEDURE — 72100 X-RAY EXAM L-S SPINE 2/3 VWS: CPT | Performed by: SURGERY

## 2025-07-22 NOTE — ASSESSMENT & PLAN NOTE
Patient's diabetes is currently controlled, with A1c of  5.8%    Patient continued to do well on Rybelsus. Sugars are stable but she has stopped losing weight. PCP switched to Ozempic. Patient picked up first fill but is concerned over the cost. Discuss re-enrolling her in  PAP.     Because she was already established on semglutide, patient and I discuss starting on 0.5 mg dose of Ozempic and she is agreeable.     Plan:  START Ozempic 0.5 mg weekly  Will submit to [facility] when PAP documents are available.

## 2025-07-28 ENCOUNTER — HOSPITAL ENCOUNTER (OUTPATIENT)
Dept: RADIOLOGY | Facility: HOSPITAL | Age: 65
Discharge: HOME | End: 2025-07-28
Payer: MEDICARE

## 2025-07-28 VITALS — HEIGHT: 65 IN | BODY MASS INDEX: 42.65 KG/M2 | WEIGHT: 256 LBS

## 2025-07-28 DIAGNOSIS — Z12.31 SCREENING MAMMOGRAM, ENCOUNTER FOR: ICD-10-CM

## 2025-07-28 PROCEDURE — 77067 SCR MAMMO BI INCL CAD: CPT

## 2025-07-28 PROCEDURE — 77063 BREAST TOMOSYNTHESIS BI: CPT

## 2025-08-11 ENCOUNTER — EVALUATION (OUTPATIENT)
Dept: PHYSICAL THERAPY | Facility: CLINIC | Age: 65
End: 2025-08-11
Payer: MEDICARE

## 2025-08-11 DIAGNOSIS — M43.06 SPONDYLOLYSIS, LUMBAR REGION: Primary | ICD-10-CM

## 2025-08-11 PROCEDURE — 97162 PT EVAL MOD COMPLEX 30 MIN: CPT | Mod: GP

## 2025-08-11 PROCEDURE — 97110 THERAPEUTIC EXERCISES: CPT | Mod: GP

## 2025-08-15 ENCOUNTER — TREATMENT (OUTPATIENT)
Dept: PHYSICAL THERAPY | Facility: CLINIC | Age: 65
End: 2025-08-15
Payer: MEDICARE

## 2025-08-15 DIAGNOSIS — M43.06 SPONDYLOLYSIS, LUMBAR REGION: ICD-10-CM

## 2025-08-15 PROCEDURE — 97110 THERAPEUTIC EXERCISES: CPT | Mod: GP,CQ

## 2025-08-18 ENCOUNTER — TREATMENT (OUTPATIENT)
Dept: PHYSICAL THERAPY | Facility: CLINIC | Age: 65
End: 2025-08-18
Payer: MEDICARE

## 2025-08-18 DIAGNOSIS — M43.06 SPONDYLOLYSIS, LUMBAR REGION: Primary | ICD-10-CM

## 2025-08-18 PROCEDURE — 97110 THERAPEUTIC EXERCISES: CPT | Mod: GP

## 2025-08-19 ENCOUNTER — APPOINTMENT (OUTPATIENT)
Dept: PHARMACY | Facility: HOSPITAL | Age: 65
End: 2025-08-19
Payer: MEDICARE

## 2025-08-20 ENCOUNTER — TREATMENT (OUTPATIENT)
Dept: PHYSICAL THERAPY | Facility: CLINIC | Age: 65
End: 2025-08-20
Payer: MEDICARE

## 2025-08-20 DIAGNOSIS — M43.06 SPONDYLOLYSIS, LUMBAR REGION: ICD-10-CM

## 2025-08-20 PROCEDURE — 97110 THERAPEUTIC EXERCISES: CPT | Mod: GP,CQ

## 2025-08-26 ENCOUNTER — TREATMENT (OUTPATIENT)
Dept: PHYSICAL THERAPY | Facility: CLINIC | Age: 65
End: 2025-08-26
Payer: MEDICARE

## 2025-08-26 DIAGNOSIS — M43.06 SPONDYLOLYSIS, LUMBAR REGION: ICD-10-CM

## 2025-08-26 PROCEDURE — 97110 THERAPEUTIC EXERCISES: CPT | Mod: GP,CQ

## 2025-08-29 ENCOUNTER — DOCUMENTATION (OUTPATIENT)
Dept: PHYSICAL THERAPY | Facility: CLINIC | Age: 65
End: 2025-08-29
Payer: MEDICARE

## 2025-09-03 ENCOUNTER — DOCUMENTATION (OUTPATIENT)
Dept: PHARMACY | Facility: HOSPITAL | Age: 65
End: 2025-09-03

## 2025-09-03 ENCOUNTER — TREATMENT (OUTPATIENT)
Dept: PHYSICAL THERAPY | Facility: CLINIC | Age: 65
End: 2025-09-03
Payer: MEDICARE

## 2025-09-03 DIAGNOSIS — M43.06 SPONDYLOLYSIS, LUMBAR REGION: ICD-10-CM

## 2025-09-03 PROCEDURE — 97110 THERAPEUTIC EXERCISES: CPT | Mod: GP,CQ

## 2025-09-04 ENCOUNTER — APPOINTMENT (OUTPATIENT)
Dept: PRIMARY CARE | Facility: CLINIC | Age: 65
End: 2025-09-04
Payer: MEDICARE

## 2025-09-04 PROBLEM — E66.01 MORBID OBESITY (MULTI): Status: ACTIVE | Noted: 2025-09-04

## 2025-09-04 PROBLEM — M16.10 PRIMARY LOCALIZED OSTEOARTHRITIS OF PELVIC REGION AND THIGH: Status: ACTIVE | Noted: 2025-09-04

## 2025-09-04 ASSESSMENT — ENCOUNTER SYMPTOMS
SHORTNESS OF BREATH: 0
HEADACHES: 0
BLOOD IN STOOL: 0
ABDOMINAL PAIN: 0
TROUBLE SWALLOWING: 0
HEMATURIA: 0
WEAKNESS: 0
CONSTIPATION: 0
ACTIVITY CHANGE: 0
CHILLS: 0
DIARRHEA: 0
NAUSEA: 0
APPETITE CHANGE: 0
ARTHRALGIAS: 0
FREQUENCY: 0
NUMBNESS: 0
LIGHT-HEADEDNESS: 0
FEVER: 0
VOMITING: 0
DYSURIA: 0
DIZZINESS: 0
SLEEP DISTURBANCE: 0

## 2025-09-04 ASSESSMENT — ACTIVITIES OF DAILY LIVING (ADL)
TAKING_MEDICATION: INDEPENDENT
DOING_HOUSEWORK: INDEPENDENT
GROCERY_SHOPPING: INDEPENDENT
MANAGING_FINANCES: INDEPENDENT
DRESSING: INDEPENDENT
BATHING: INDEPENDENT

## 2025-09-04 ASSESSMENT — PATIENT HEALTH QUESTIONNAIRE - PHQ9
1. LITTLE INTEREST OR PLEASURE IN DOING THINGS: NOT AT ALL
SUM OF ALL RESPONSES TO PHQ9 QUESTIONS 1 AND 2: 0
2. FEELING DOWN, DEPRESSED OR HOPELESS: NOT AT ALL

## 2025-09-05 ENCOUNTER — TREATMENT (OUTPATIENT)
Dept: PHYSICAL THERAPY | Facility: CLINIC | Age: 65
End: 2025-09-05
Payer: MEDICARE

## 2025-09-05 DIAGNOSIS — M43.06 SPONDYLOLYSIS, LUMBAR REGION: ICD-10-CM

## 2025-09-05 PROCEDURE — 97110 THERAPEUTIC EXERCISES: CPT | Mod: GP,CQ

## 2025-09-08 ENCOUNTER — APPOINTMENT (OUTPATIENT)
Dept: PHARMACY | Facility: HOSPITAL | Age: 65
End: 2025-09-08
Payer: MEDICARE

## 2025-10-09 ENCOUNTER — APPOINTMENT (OUTPATIENT)
Facility: HOSPITAL | Age: 65
End: 2025-10-09
Payer: MEDICARE

## 2025-12-04 ENCOUNTER — APPOINTMENT (OUTPATIENT)
Dept: PRIMARY CARE | Facility: CLINIC | Age: 65
End: 2025-12-04
Payer: MEDICARE

## 2026-07-14 ENCOUNTER — APPOINTMENT (OUTPATIENT)
Dept: OPHTHALMOLOGY | Age: 66
End: 2026-07-14
Payer: MEDICARE

## (undated) DEVICE — DRAPE, SHEET, U, STERI DRAPE, 47 X 51 IN, DISPOSABLE, STERILE

## (undated) DEVICE — SUTURE, VICRYL, 0, 36 IN, CT-1, UNDYED

## (undated) DEVICE — GLOVE, PROTEXIS PI CLASSIC, SZ-8.0, PF, PF, LF

## (undated) DEVICE — SYRINGE, 50 CC, LUER LOCK

## (undated) DEVICE — APPLICATOR, CHLORAPREP, W/ORANGE TINT, 26ML

## (undated) DEVICE — DRAPE, SHEET, 17 X 23 IN

## (undated) DEVICE — PILLOW, ABDUCTION, MEDIUM

## (undated) DEVICE — SUTURE, VICRYL, 1, 18 IN, CT-1, VIOLET

## (undated) DEVICE — SOLUTION, IRRIGATION, SODIUM CHLORIDE 0.9%, 1000 ML, POUR BOTTLE

## (undated) DEVICE — STOCKINETTE, TUBULAR, DBL PLY, PRE-CUT, 6 X 48, SYNTH, STERILE

## (undated) DEVICE — BLADE, SPECIAL CONVERSION SAGITTAL

## (undated) DEVICE — ELECTRODE, ELECTROSURGICAL, BLADE, EXTENDED

## (undated) DEVICE — DRAPE, TIBURON, HIP W/POUCHES

## (undated) DEVICE — Device

## (undated) DEVICE — SUTURE, VICRYL, 1, 36 IN, CT-1, UNDYED

## (undated) DEVICE — DRILL BIT, 3.3 X 25MM

## (undated) DEVICE — COVER HANDLE LIGHT, STERIS, BLUE, STERILE

## (undated) DEVICE — GLOVE, SURGICAL, PROTEXIS PI BLUE W/NEUTHERA, 8.0, PF, LF

## (undated) DEVICE — RETRIEVER, SUTURE, HEWSON

## (undated) DEVICE — SOLUTION, IRRIGATION, USP, SODIUM CHLORIDE 0.9%, 3000 ML

## (undated) DEVICE — STAPLER, SKIN, MULTIFIRE, PREMIUM, WIDE, 35 W

## (undated) DEVICE — NEEDLE, HYPODERMIC, REGULAR WALL, REGULAR BEVEL, 18 G X 1.5 IN